# Patient Record
Sex: MALE | Race: WHITE | NOT HISPANIC OR LATINO | Employment: OTHER | URBAN - METROPOLITAN AREA
[De-identification: names, ages, dates, MRNs, and addresses within clinical notes are randomized per-mention and may not be internally consistent; named-entity substitution may affect disease eponyms.]

---

## 2017-01-05 ENCOUNTER — OFFICE VISIT (OUTPATIENT)
Dept: ENDOCRINOLOGY | Age: 67
End: 2017-01-05

## 2017-01-05 VITALS
BODY MASS INDEX: 35.04 KG/M2 | SYSTOLIC BLOOD PRESSURE: 126 MMHG | WEIGHT: 231.2 LBS | RESPIRATION RATE: 16 BRPM | HEIGHT: 68 IN | DIASTOLIC BLOOD PRESSURE: 80 MMHG

## 2017-01-05 DIAGNOSIS — R79.89 LOW TESTOSTERONE: Primary | ICD-10-CM

## 2017-01-05 DIAGNOSIS — E55.9 AVITAMINOSIS D: ICD-10-CM

## 2017-01-05 DIAGNOSIS — IMO0002 UNCONTROLLED TYPE 2 DIABETES MELLITUS WITH OTHER SPECIFIED COMPLICATION: ICD-10-CM

## 2017-01-05 DIAGNOSIS — E78.5 HYPERLIPIDEMIA, UNSPECIFIED HYPERLIPIDEMIA TYPE: ICD-10-CM

## 2017-01-05 DIAGNOSIS — R79.89 LOW TESTOSTERONE: ICD-10-CM

## 2017-01-05 DIAGNOSIS — IMO0002 UNCONTROLLED TYPE 2 DIABETES MELLITUS WITH OTHER SPECIFIED COMPLICATION: Primary | ICD-10-CM

## 2017-01-05 DIAGNOSIS — I10 ESSENTIAL HYPERTENSION: ICD-10-CM

## 2017-01-05 PROCEDURE — 99214 OFFICE O/P EST MOD 30 MIN: CPT | Performed by: INTERNAL MEDICINE

## 2017-01-05 RX ORDER — SITAGLIPTIN 100 MG/1
100 TABLET, FILM COATED ORAL DAILY
Qty: 90 TABLET | Refills: 3 | Status: SHIPPED | OUTPATIENT
Start: 2017-01-05 | End: 2017-09-14 | Stop reason: SDUPTHER

## 2017-01-05 RX ORDER — LOSARTAN POTASSIUM AND HYDROCHLOROTHIAZIDE 25; 100 MG/1; MG/1
1 TABLET ORAL DAILY
Qty: 90 TABLET | Refills: 3 | Status: SHIPPED | OUTPATIENT
Start: 2017-01-05 | End: 2017-09-14 | Stop reason: SDUPTHER

## 2017-01-05 RX ORDER — ROSUVASTATIN CALCIUM 20 MG/1
20 TABLET, COATED ORAL DAILY
Qty: 90 TABLET | Refills: 3 | Status: SHIPPED | OUTPATIENT
Start: 2017-01-05 | End: 2017-05-11 | Stop reason: SDUPTHER

## 2017-01-05 RX ORDER — FENOFIBRATE 145 MG/1
145 TABLET, COATED ORAL DAILY
Qty: 90 TABLET | Refills: 3 | Status: SHIPPED | OUTPATIENT
Start: 2017-01-05 | End: 2017-09-14 | Stop reason: SDUPTHER

## 2017-01-05 RX ORDER — ERGOCALCIFEROL 1.25 MG/1
CAPSULE ORAL
Qty: 26 CAPSULE | Refills: 3 | Status: SHIPPED | OUTPATIENT
Start: 2017-01-05 | End: 2017-09-14 | Stop reason: SDUPTHER

## 2017-01-05 RX ORDER — TESTOSTERONE 10 MG/.5G
GEL, METERED TOPICAL
Qty: 120 G | Refills: 5 | Status: SHIPPED | OUTPATIENT
Start: 2017-01-05 | End: 2017-05-11 | Stop reason: SDUPTHER

## 2017-01-05 RX ORDER — PIOGLITAZONE HCL AND METFORMIN HCL 850; 15 MG/1; MG/1
TABLET ORAL
Qty: 270 TABLET | Refills: 3 | Status: SHIPPED | OUTPATIENT
Start: 2017-01-05 | End: 2017-09-14 | Stop reason: SDUPTHER

## 2017-01-05 RX ORDER — ICOSAPENT ETHYL 1000 MG/1
CAPSULE ORAL
Qty: 360 CAPSULE | Refills: 3 | Status: SHIPPED | OUTPATIENT
Start: 2017-01-05 | End: 2017-09-14 | Stop reason: SDUPTHER

## 2017-01-05 NOTE — LETTER
January 5, 2017     Terri Park PA-C  [address] [facility]  [place] [id number]    Patient: Reji Morales   YOB: 1950   Date of Visit: 1/5/2017       Dear Dr. Xavier PA-C:    Reji Morales was in my office today. Below are the relevant portions of my assessment and plan of care.      Diagnoses and all orders for this visit:    Uncontrolled type 2 diabetes mellitus with other specified complication  -     T4 & TSH ([facility]); Future  -     TestT+TestF+SHBG; Future  -     Uric Acid; Future  -     Vitamin D 25 Hydroxy; Future  -     Comprehensive Metabolic Panel; Future  -     C-Peptide; Future  -     Hemoglobin A1c; Future  -     Lipid Panel; Future  -     MicroAlbumin, Urine, Random; Future    Avitaminosis D  -     T4 & TSH ([facility]); Future  -     TestT+TestF+SHBG; Future  -     Uric Acid; Future  -     Vitamin D 25 Hydroxy; Future  -     Comprehensive Metabolic Panel; Future  -     C-Peptide; Future  -     Hemoglobin A1c; Future  -     Lipid Panel; Future  -     MicroAlbumin, Urine, Random; Future    Essential hypertension  -     T4 & TSH ([facility]); Future  -     TestT+TestF+SHBG; Future  -     Uric Acid; Future  -     Vitamin D 25 Hydroxy; Future  -     Comprehensive Metabolic Panel; Future  -     C-Peptide; Future  -     Hemoglobin A1c; Future  -     Lipid Panel; Future  -     MicroAlbumin, Urine, Random; Future    Hyperlipidemia, unspecified hyperlipidemia type  -     T4 & TSH ([facility]); Future  -     TestT+TestF+SHBG; Future  -     Uric Acid; Future  -     Vitamin D 25 Hydroxy; Future  -     Comprehensive Metabolic Panel; Future  -     C-Peptide; Future  -     Hemoglobin A1c; Future  -     Lipid Panel; Future  -     MicroAlbumin, Urine, Random; Future    Low testosterone  -     T4 & TSH ([facility]); Future  -     TestT+TestF+SHBG; Future  -     Uric Acid; Future  -     Vitamin D 25 Hydroxy; Future  -     Comprehensive Metabolic Panel; Future  -     C-Peptide; Future  -     Hemoglobin A1c;  Future  -     Lipid Panel; Future  -     MicroAlbumin, Urine, Random; Future    Other orders  -     rosuvastatin (CRESTOR) 20 MG tablet; Take 1 tablet by mouth Daily.  -     fenofibrate (TRICOR) 145 MG tablet; Take 1 tablet by mouth Daily.  -     losartan-hydrochlorothiazide (HYZAAR) 100-25 MG per tablet; Take 1 tablet by mouth Daily.  -     pioglitazone-metFORMIN (ACTOPLUS MET)  MG per tablet; 1 in am and 2 in pm  -     JANUVIA 100 MG tablet; Take 1 tablet by mouth Daily.  -     vitamin D (ERGOCALCIFEROL) 20698 UNITS capsule capsule; 2 po q week  -     Testosterone 10 MG/ACT (2%) gel; 2 pump actuation to each inner thighs daily  -     VASCEPA 1 G capsule capsule; 2 po bid                  If you have questions, please do not hesitate to call me. I look forward to following Reji along with you.         Sincerely,        Leoncio Calvert MD        CC: No Recipients

## 2017-01-05 NOTE — MR AVS SNAPSHOT
Reji Morales   1/5/2017 10:20 AM   Office Visit    Dept Phone:  [phone]   Encounter #:  [id number]    Provider:  Leoncio Calvert MD   Department:  [facility] ENDOCRINOLOGY                Your Full Care Plan              Today's Medication Changes          These changes are accurate as of: 1/5/17 11:18 AM.  If you have any questions, ask your nurse or doctor.               New Medication(s)Ordered:     fenofibrate 145 MG tablet   Commonly known as:  TRICOR   Take 1 tablet by mouth Daily.   Started by:  Leoncio Calvert MD       JANUVIA 100 MG tablet   Generic drug:  SITagliptin   Take 1 tablet by mouth Daily.   Replaces:  SITagliptin 100 MG tablet   Started by:  Leoncio Calvert MD       VASCEPA 1 G capsule capsule   Generic drug:  icosapent ethyl   2 po bid   Replaces:  icosapent ethyl 1 G capsule capsule   Started by:  Leoncio Calvert MD         Medication(s)that have changed:     rosuvastatin 20 MG tablet   Commonly known as:  CRESTOR   Take 1 tablet by mouth Daily.   What changed:  See the new instructions.   Changed by:  Leoncio Calvert MD       Testosterone 10 MG/ACT (2%) gel   2 pump actuation to each inner thighs daily   What changed:  additional instructions   Changed by:  Leoncio Calvert MD         Stop taking medication(s)listed here:     fenofibrate micronized 130 MG capsule   Commonly known as:  ANTARA   Stopped by:  Leoncio Calvert MD           icosapent ethyl 1 G capsule capsule   Commonly known as:  VASCEPA   Replaced by:  VASCEPA 1 G capsule capsule   Stopped by:  Leoncio Calvert MD           SITagliptin 100 MG tablet   Commonly known as:  JANUVIA   Replaced by:  JANUVIA 100 MG tablet   Stopped by:  Leoncio Calvert MD                Where to Get Your Medications      These medications were sent to [facility] [address] [place] [address] [facility] [phone] [facility] [phone]   [address] [place] [address]     Phone:   [phone]     fenofibrate 145 MG tablet    JANUVIA 100 MG tablet    losartan-hydrochlorothiazide 100-25 MG per tablet    pioglitazone-metFORMIN  MG per tablet    rosuvastatin 20 MG tablet    VASCEPA 1 G capsule capsule    vitamin D 60525 UNITS capsule capsule         You can get these medications from any pharmacy     Bring a paper prescription for each of these medications     Testosterone 10 MG/ACT (2%) gel                  Your Updated Medication List          This list is accurate as of: 1/5/17 11:18 AM.  Always use your most recent med list.                aspirin 81 MG disintegrating tablet       fenofibrate 145 MG tablet   Commonly known as:  TRICOR   Take 1 tablet by mouth Daily.       JANUVIA 100 MG tablet   Generic drug:  SITagliptin   Take 1 tablet by mouth Daily.       losartan-hydrochlorothiazide 100-25 MG per tablet   Commonly known as:  HYZAAR   Take 1 tablet by mouth Daily.       pioglitazone-metFORMIN  MG per tablet   Commonly known as:  ACTOPLUS MET   1 in am and 2 in pm       rosuvastatin 20 MG tablet   Commonly known as:  CRESTOR   Take 1 tablet by mouth Daily.       Testosterone 10 MG/ACT (2%) gel   2 pump actuation to each inner thighs daily       VASCEPA 1 G capsule capsule   Generic drug:  icosapent ethyl   2 po bid       vitamin b complex tablet tablet       vitamin D 17263 UNITS capsule capsule   Commonly known as:  ERGOCALCIFEROL   2 po q week               You Were Diagnosed With        Codes Comments    Uncontrolled type 2 diabetes mellitus with other specified complication    -  Primary ICD-10-CM: E11.69, E11.65     Avitaminosis D     ICD-10-CM: E55.9  ICD-9-CM: 268.9     Essential hypertension     ICD-10-CM: I10  ICD-9-CM: 401.9     Hyperlipidemia, unspecified hyperlipidemia type     ICD-10-CM: E78.5  ICD-9-CM: 272.4     Low testosterone     ICD-10-CM: E29.1  ICD-9-CM: 257.2       Instructions     None    Patient Instructions History      Upcoming Appointments     Visit  Type Date Time Department    OFFICE VISIT 2017 10:20 AM [facility]    [facility] 2017  8:30 AM [facility]    OFFICE VISIT 2017 11:00 AM [facility]    [facility] 2017  8:30 AM [facility]    OFFICE VISIT 2017  9:40 AM [facility]      MyChart Signup     [facility] [employer] allows you to send messages to your doctor, view your test results, renew your prescriptions, schedule appointments, and more. To sign up, go to [employer] and click on the Sign Up Now link in the New User? box. Enter your [employer] Activation Code exactly as it appears below along with the last four digits of your Social Security Number and your Date of Birth () to complete the sign-up process. If you do not sign up before the expiration date, you must request a new code.    [employer] Activation Code: [email or web address]  Expires: 2017 11:12 AM    If you have questions, you can email [email or web address] or call [phone] to talk to our [employer] staff. Remember, [employer] is NOT to be used for urgent needs. For medical emergencies, dial 911.               Other Info from Your Visit           Your Appointments     2017  8:30 AM EDT   [facility] with [facility], [facility]   [facility] ENDOCRINOLOGY (--)    [address] [place] [id number] [phone]            May 11, 2017 11:00 AM EDT   Office Visit with THAIS Vann   [facility] ENDOCRINOLOGY (--)    [address] [place] [id number] [phone]           Arrive 15 minutes prior to appointment.            Aug 31, 2017  8:30 AM EDT   [facility] with [facility], [facility]   [facility] ENDOCRINOLOGY (--)    [address] [place] [id number] [phone]            Sep 14, 2017  9:40 AM EDT   Office Visit with Leoncio Calvert MD   [facility] ENDOCRINOLOGY (--)  "   [address] [address] [phone]           Arrive 15 minutes prior to appointment.              Allergies     No Known Drug Allergy        Vital Signs     Blood Pressure Respirations Height Weight Body Mass Index Smoking Status    126/80 16 68\" (172.7 cm) 231 lb 3.2 oz (105 kg) 35.15 kg/m2 Never Smoker      Problems and Diagnoses Noted     Avitaminosis D    High blood pressure    Type II diabetes mellitus without control    High cholesterol or triglycerides    Low testosterone      No Longer an Issue     Serum calcium elevated        "

## 2017-01-05 NOTE — LETTER
January 5, 2017     Terri Park PA-C  [address] [facility]  [address]  [place] [id number]    Patient: Reji Morales   YOB: 1950   Date of Visit: 1/5/2017       Dear Dr. Xavier PA-C:    Thank you for referring Reji Morales to me for evaluation. Below are the relevant portions of my assessment and plan of care.      Diagnoses and all orders for this visit:    Uncontrolled type 2 diabetes mellitus with other specified complication  -     T4 & TSH ([facility]); Future  -     TestT+TestF+SHBG; Future  -     Uric Acid; Future  -     Vitamin D 25 Hydroxy; Future  -     Comprehensive Metabolic Panel; Future  -     C-Peptide; Future  -     Hemoglobin A1c; Future  -     Lipid Panel; Future  -     MicroAlbumin, Urine, Random; Future    Avitaminosis D  -     T4 & TSH ([facility]); Future  -     TestT+TestF+SHBG; Future  -     Uric Acid; Future  -     Vitamin D 25 Hydroxy; Future  -     Comprehensive Metabolic Panel; Future  -     C-Peptide; Future  -     Hemoglobin A1c; Future  -     Lipid Panel; Future  -     MicroAlbumin, Urine, Random; Future    Essential hypertension  -     T4 & TSH ([facility]); Future  -     TestT+TestF+SHBG; Future  -     Uric Acid; Future  -     Vitamin D 25 Hydroxy; Future  -     Comprehensive Metabolic Panel; Future  -     C-Peptide; Future  -     Hemoglobin A1c; Future  -     Lipid Panel; Future  -     MicroAlbumin, Urine, Random; Future    Hyperlipidemia, unspecified hyperlipidemia type  -     T4 & TSH ([facility]); Future  -     TestT+TestF+SHBG; Future  -     Uric Acid; Future  -     Vitamin D 25 Hydroxy; Future  -     Comprehensive Metabolic Panel; Future  -     C-Peptide; Future  -     Hemoglobin A1c; Future  -     Lipid Panel; Future  -     MicroAlbumin, Urine, Random; Future    Low testosterone  -     T4 & TSH ([facility]); Future  -     TestT+TestF+SHBG; Future  -     Uric Acid; Future  -     Vitamin D 25 Hydroxy; Future  -     Comprehensive Metabolic Panel; Future  -     C-Peptide; Future  -      Hemoglobin A1c; Future  -     Lipid Panel; Future  -     MicroAlbumin, Urine, Random; Future    Other orders  -     rosuvastatin (CRESTOR) 20 MG tablet; Take 1 tablet by mouth Daily.  -     fenofibrate (TRICOR) 145 MG tablet; Take 1 tablet by mouth Daily.  -     losartan-hydrochlorothiazide (HYZAAR) 100-25 MG per tablet; Take 1 tablet by mouth Daily.  -     pioglitazone-metFORMIN (ACTOPLUS MET)  MG per tablet; 1 in am and 2 in pm  -     JANUVIA 100 MG tablet; Take 1 tablet by mouth Daily.  -     vitamin D (ERGOCALCIFEROL) 82637 UNITS capsule capsule; 2 po q week  -     Testosterone 10 MG/ACT (2%) gel; 2 pump actuation to each inner thighs daily  -     VASCEPA 1 G capsule capsule; 2 po bid                          If you have questions, please do not hesitate to call me. I look forward to following Reji along with you.         Sincerely,        Leoncio Calvert MD        CC: No Recipients

## 2017-01-05 NOTE — PROGRESS NOTES
Kalie Morales is a 66 y.o. male seen for follow up for DM2, hypogonadism, vit d deficiency, hyperlipidemia, lab review. Patient is not checking his BG. He denies any problems or concerns. He is needing his medication changed due to insurance coverage.     History of Present Illness this is a 66-year-old gentleman known patient with type II diabetes hypertension dyslipidemia hypogonadism vitamin D deficiency and coronary artery disease a status post coronary artery bypass graft surgery.  Over the course of last 6 months he has had no significant health problems for which to go to emergency room or hospital.    The following portions of the patient's history were reviewed and updated as appropriate: allergies, current medications, past family history, past medical history, past social history, past surgical history and problem list.    Review of Systems   Constitutional: Negative.    HENT: Negative.    Eyes: Negative.    Respiratory: Negative.    Cardiovascular: Negative.    Gastrointestinal: Negative.    Endocrine: Negative.    Genitourinary: Negative.    Musculoskeletal: Negative.    Skin: Negative.    Allergic/Immunologic: Negative.    Neurological: Negative.    Hematological: Negative.    Psychiatric/Behavioral: Negative.        Objective   Physical Exam   Constitutional: He is oriented to person, place, and time. He appears well-developed and well-nourished. No distress.   HENT:   Head: Normocephalic and atraumatic.   Right Ear: External ear normal.   Left Ear: External ear normal.   Nose: Nose normal.   Mouth/Throat: Oropharynx is clear and moist. No oropharyngeal exudate.   Eyes: Conjunctivae and EOM are normal. Pupils are equal, round, and reactive to light. Right eye exhibits no discharge. Left eye exhibits no discharge. No scleral icterus.   Neck: Normal range of motion. Neck supple. No JVD present. No tracheal deviation present. No thyromegaly present.   Cardiovascular: Normal rate, regular  rhythm, normal heart sounds and intact distal pulses.  Exam reveals no gallop and no friction rub.    No murmur heard.  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   Pulmonary/Chest: Effort normal and breath sounds normal. No stridor. No respiratory distress. He has no wheezes. He has no rales. He exhibits no tenderness.   Abdominal: Soft. Bowel sounds are normal. He exhibits no distension and no mass. There is no tenderness. There is no rebound and no guarding. No hernia.   Musculoskeletal: Normal range of motion. He exhibits no edema, tenderness or deformity.   Lymphadenopathy:     He has no cervical adenopathy.   Neurological: He is alert and oriented to person, place, and time. He has normal reflexes. He displays normal reflexes. No cranial nerve deficit. He exhibits normal muscle tone. Coordination normal.   Skin: Skin is warm and dry. No rash noted. He is not diaphoretic. No erythema. No pallor.   Psychiatric: He has a normal mood and affect. His behavior is normal. Judgment and thought content normal.   Nursing note and vitals reviewed.        Assessment/Plan   Diagnoses and all orders for this visit:    Uncontrolled type 2 diabetes mellitus with other specified complication  -     T4 & TSH ([facility]); Future  -     TestT+TestF+SHBG; Future  -     Uric Acid; Future  -     Vitamin D 25 Hydroxy; Future  -     Comprehensive Metabolic Panel; Future  -     C-Peptide; Future  -     Hemoglobin A1c; Future  -     Lipid Panel; Future  -     MicroAlbumin, Urine, Random; Future    Avitaminosis D  -     T4 & TSH ([facility]); Future  -     TestT+TestF+SHBG; Future  -     Uric Acid; Future  -     Vitamin D 25 Hydroxy; Future  -     Comprehensive Metabolic Panel; Future  -     C-Peptide; Future  -     Hemoglobin A1c; Future  -     Lipid Panel; Future  -     MicroAlbumin, Urine, Random; Future    Essential hypertension  -     T4 & TSH ([facility]); Future  -     TestT+TestF+SHBG; Future  -     Uric Acid; Future  -      Vitamin D 25 Hydroxy; Future  -     Comprehensive Metabolic Panel; Future  -     C-Peptide; Future  -     Hemoglobin A1c; Future  -     Lipid Panel; Future  -     MicroAlbumin, Urine, Random; Future    Hyperlipidemia, unspecified hyperlipidemia type  -     T4 & TSH ([facility]); Future  -     TestT+TestF+SHBG; Future  -     Uric Acid; Future  -     Vitamin D 25 Hydroxy; Future  -     Comprehensive Metabolic Panel; Future  -     C-Peptide; Future  -     Hemoglobin A1c; Future  -     Lipid Panel; Future  -     MicroAlbumin, Urine, Random; Future    Low testosterone  -     T4 & TSH ([facility]); Future  -     TestT+TestF+SHBG; Future  -     Uric Acid; Future  -     Vitamin D 25 Hydroxy; Future  -     Comprehensive Metabolic Panel; Future  -     C-Peptide; Future  -     Hemoglobin A1c; Future  -     Lipid Panel; Future  -     MicroAlbumin, Urine, Random; Future    Other orders  -     rosuvastatin (CRESTOR) 20 MG tablet; Take 1 tablet by mouth Daily.  -     fenofibrate (TRICOR) 145 MG tablet; Take 1 tablet by mouth Daily.  -     losartan-hydrochlorothiazide (HYZAAR) 100-25 MG per tablet; Take 1 tablet by mouth Daily.  -     pioglitazone-metFORMIN (ACTOPLUS MET)  MG per tablet; 1 in am and 2 in pm  -     JANUVIA 100 MG tablet; Take 1 tablet by mouth Daily.  -     vitamin D (ERGOCALCIFEROL) 71554 UNITS capsule capsule; 2 po q week  -     Testosterone 10 MG/ACT (2%) gel; 2 pump actuation to each inner thighs daily  -     VASCEPA 1 G capsule capsule; 2 po bid               In summary I saw and examined this 66-year-old gentleman for above-mentioned problems.  I reviewed his laboratory evaluation of 12/22/2016 and provided him with a hard copy of it.  He is clinically and metabolically stable and therefore we will go ahead and continue all his current prescriptions.  He will see Ms. May Sandoval in 4 months and myself in 8 months or sooner if needed with laboratory evaluation prior to each office visit.

## 2017-04-27 ENCOUNTER — RESULTS ENCOUNTER (OUTPATIENT)
Dept: ENDOCRINOLOGY | Age: 67
End: 2017-04-27

## 2017-04-27 ENCOUNTER — LAB (OUTPATIENT)
Dept: ENDOCRINOLOGY | Age: 67
End: 2017-04-27

## 2017-04-27 DIAGNOSIS — E78.5 HYPERLIPIDEMIA, UNSPECIFIED HYPERLIPIDEMIA TYPE: ICD-10-CM

## 2017-04-27 DIAGNOSIS — IMO0002 UNCONTROLLED TYPE 2 DIABETES MELLITUS WITH OTHER SPECIFIED COMPLICATION: ICD-10-CM

## 2017-04-27 DIAGNOSIS — I10 ESSENTIAL HYPERTENSION: ICD-10-CM

## 2017-04-27 DIAGNOSIS — R79.89 LOW TESTOSTERONE: ICD-10-CM

## 2017-04-27 DIAGNOSIS — E55.9 AVITAMINOSIS D: ICD-10-CM

## 2017-04-27 DIAGNOSIS — E55.9 AVITAMINOSIS D: Primary | ICD-10-CM

## 2017-04-28 LAB
25(OH)D3+25(OH)D2 SERPL-MCNC: 48 NG/ML (ref 30–100)
ALBUMIN SERPL-MCNC: 4.5 G/DL (ref 3.5–5.2)
ALBUMIN/GLOB SERPL: 1.8 G/DL
ALP SERPL-CCNC: 28 U/L (ref 39–117)
ALT SERPL-CCNC: 29 U/L (ref 1–41)
AST SERPL-CCNC: 28 U/L (ref 1–40)
BILIRUB SERPL-MCNC: 0.9 MG/DL (ref 0.1–1.2)
BUN SERPL-MCNC: 16 MG/DL (ref 8–23)
BUN/CREAT SERPL: 16.8 (ref 7–25)
C PEPTIDE SERPL-MCNC: 4.3 NG/ML (ref 1.1–4.4)
CALCIUM SERPL-MCNC: 10.2 MG/DL (ref 8.6–10.5)
CHLORIDE SERPL-SCNC: 100 MMOL/L (ref 98–107)
CHOLEST SERPL-MCNC: 130 MG/DL (ref 0–200)
CO2 SERPL-SCNC: 28.9 MMOL/L (ref 22–29)
CONV COMMENT: ABNORMAL
CREAT SERPL-MCNC: 0.95 MG/DL (ref 0.76–1.27)
GLOBULIN SER CALC-MCNC: 2.5 GM/DL
GLUCOSE SERPL-MCNC: 144 MG/DL (ref 65–99)
HBA1C MFR BLD: 6.61 % (ref 4.8–5.6)
HDLC SERPL-MCNC: 50 MG/DL (ref 40–60)
LDLC SERPL CALC-MCNC: 52 MG/DL (ref 0–100)
MICROALBUMIN UR-MCNC: 6.9 UG/ML
POTASSIUM SERPL-SCNC: 4.4 MMOL/L (ref 3.5–5.2)
PROT SERPL-MCNC: 7 G/DL (ref 6–8.5)
PSA SERPL-MCNC: 0.47 NG/ML (ref 0–4)
SHBG SERPL-SCNC: 31.9 NMOL/L (ref 19.3–76.4)
SODIUM SERPL-SCNC: 142 MMOL/L (ref 136–145)
T4 SERPL-MCNC: 7.66 MCG/DL (ref 4.5–11.7)
TESTOST FREE SERPL-MCNC: 3.4 PG/ML (ref 6.6–18.1)
TESTOST SERPL-MCNC: 86 NG/DL (ref 348–1197)
TRIGL SERPL-MCNC: 138 MG/DL (ref 0–150)
TSH SERPL DL<=0.005 MIU/L-ACNC: 3.39 MIU/ML (ref 0.27–4.2)
URATE SERPL-MCNC: 3.9 MG/DL (ref 3.4–7)
VLDLC SERPL CALC-MCNC: 27.6 MG/DL (ref 5–40)

## 2017-05-11 ENCOUNTER — OFFICE VISIT (OUTPATIENT)
Dept: ENDOCRINOLOGY | Age: 67
End: 2017-05-11

## 2017-05-11 VITALS
HEIGHT: 68 IN | SYSTOLIC BLOOD PRESSURE: 130 MMHG | BODY MASS INDEX: 34.92 KG/M2 | DIASTOLIC BLOOD PRESSURE: 68 MMHG | WEIGHT: 230.4 LBS

## 2017-05-11 DIAGNOSIS — E78.5 HYPERLIPIDEMIA, UNSPECIFIED HYPERLIPIDEMIA TYPE: ICD-10-CM

## 2017-05-11 DIAGNOSIS — E29.1 HYPOGONADISM IN MALE: Primary | ICD-10-CM

## 2017-05-11 DIAGNOSIS — E55.9 VITAMIN D DEFICIENCY: ICD-10-CM

## 2017-05-11 DIAGNOSIS — IMO0002 UNCONTROLLED TYPE 2 DIABETES MELLITUS WITH OTHER SPECIFIED COMPLICATION: ICD-10-CM

## 2017-05-11 DIAGNOSIS — I10 ESSENTIAL HYPERTENSION: ICD-10-CM

## 2017-05-11 PROBLEM — E11.9 TYPE 2 DIABETES MELLITUS (HCC): Status: ACTIVE | Noted: 2017-05-11

## 2017-05-11 PROCEDURE — 99214 OFFICE O/P EST MOD 30 MIN: CPT | Performed by: NURSE PRACTITIONER

## 2017-05-11 RX ORDER — TESTOSTERONE 10 MG/.5G
GEL, METERED TOPICAL
Qty: 180 G | Refills: 0 | Status: SHIPPED | OUTPATIENT
Start: 2017-05-11 | End: 2017-08-10 | Stop reason: SDUPTHER

## 2017-05-11 RX ORDER — ROSUVASTATIN CALCIUM 20 MG/1
20 TABLET, COATED ORAL DAILY
Qty: 90 TABLET | Refills: 3 | Status: SHIPPED | OUTPATIENT
Start: 2017-05-11 | End: 2017-09-14 | Stop reason: SDUPTHER

## 2017-08-09 RX ORDER — TESTOSTERONE 10 MG/.5G
GEL, METERED TOPICAL
Refills: 0 | Status: CANCELLED | OUTPATIENT
Start: 2017-08-09

## 2017-08-10 RX ORDER — TESTOSTERONE 10 MG/.5G
GEL, METERED TOPICAL
Qty: 180 G | Refills: 0 | OUTPATIENT
Start: 2017-08-10 | End: 2017-09-08 | Stop reason: SDUPTHER

## 2017-08-31 ENCOUNTER — LAB (OUTPATIENT)
Dept: ENDOCRINOLOGY | Age: 67
End: 2017-08-31

## 2017-08-31 DIAGNOSIS — E29.1 HYPOGONADISM IN MALE: ICD-10-CM

## 2017-08-31 DIAGNOSIS — E78.5 HYPERLIPIDEMIA, UNSPECIFIED HYPERLIPIDEMIA TYPE: ICD-10-CM

## 2017-08-31 DIAGNOSIS — R79.89 LOW TESTOSTERONE: ICD-10-CM

## 2017-08-31 DIAGNOSIS — E11.69 TYPE 2 DIABETES MELLITUS WITH OTHER SPECIFIED COMPLICATION (HCC): ICD-10-CM

## 2017-08-31 DIAGNOSIS — E55.9 VITAMIN D DEFICIENCY: ICD-10-CM

## 2017-08-31 DIAGNOSIS — Z12.5 SCREENING PSA (PROSTATE SPECIFIC ANTIGEN): ICD-10-CM

## 2017-08-31 DIAGNOSIS — E11.69 TYPE 2 DIABETES MELLITUS WITH OTHER SPECIFIED COMPLICATION (HCC): Primary | ICD-10-CM

## 2017-09-01 LAB
25(OH)D3+25(OH)D2 SERPL-MCNC: 39.7 NG/ML (ref 30–100)
ALBUMIN SERPL-MCNC: 4.8 G/DL (ref 3.5–5.2)
ALBUMIN/GLOB SERPL: 2.4 G/DL
ALP SERPL-CCNC: 25 U/L (ref 39–117)
ALT SERPL-CCNC: 26 U/L (ref 1–41)
AST SERPL-CCNC: 25 U/L (ref 1–40)
BILIRUB SERPL-MCNC: 0.9 MG/DL (ref 0.1–1.2)
BUN SERPL-MCNC: 20 MG/DL (ref 8–23)
BUN/CREAT SERPL: 20.4 (ref 7–25)
C PEPTIDE SERPL-MCNC: 3.7 NG/ML (ref 1.1–4.4)
CALCIUM SERPL-MCNC: 10.5 MG/DL (ref 8.6–10.5)
CHLORIDE SERPL-SCNC: 98 MMOL/L (ref 98–107)
CHOLEST SERPL-MCNC: 108 MG/DL (ref 0–200)
CO2 SERPL-SCNC: 27.2 MMOL/L (ref 22–29)
CREAT SERPL-MCNC: 0.98 MG/DL (ref 0.76–1.27)
ERYTHROCYTE [DISTWIDTH] IN BLOOD BY AUTOMATED COUNT: 14.2 % (ref 11.5–14.5)
GLOBULIN SER CALC-MCNC: 2 GM/DL
GLUCOSE SERPL-MCNC: 137 MG/DL (ref 65–99)
HBA1C MFR BLD: 6.34 % (ref 4.8–5.6)
HCT VFR BLD AUTO: 46 % (ref 40.4–52.2)
HDLC SERPL-MCNC: 44 MG/DL (ref 40–60)
HGB BLD-MCNC: 15.8 G/DL (ref 13.7–17.6)
LDLC SERPL CALC-MCNC: 41 MG/DL (ref 0–100)
MCH RBC QN AUTO: 32.6 PG (ref 27–32.7)
MCHC RBC AUTO-ENTMCNC: 34.3 G/DL (ref 32.6–36.4)
MCV RBC AUTO: 94.8 FL (ref 79.8–96.2)
MICROALBUMIN UR-MCNC: 6 UG/ML
PLATELET # BLD AUTO: 272 10*3/MM3 (ref 140–500)
POTASSIUM SERPL-SCNC: 4.5 MMOL/L (ref 3.5–5.2)
PROT SERPL-MCNC: 6.8 G/DL (ref 6–8.5)
RBC # BLD AUTO: 4.85 10*6/MM3 (ref 4.6–6)
SHBG SERPL-SCNC: 37 NMOL/L (ref 19.3–76.4)
SODIUM SERPL-SCNC: 140 MMOL/L (ref 136–145)
T3FREE SERPL-MCNC: 3.8 PG/ML (ref 2–4.4)
T4 FREE SERPL-MCNC: 1.23 NG/DL (ref 0.93–1.7)
T4 SERPL-MCNC: 8.45 MCG/DL (ref 4.5–11.7)
TESTOST FREE SERPL-MCNC: 4.9 PG/ML (ref 6.6–18.1)
TESTOST SERPL-MCNC: 226 NG/DL (ref 264–916)
TRIGL SERPL-MCNC: 116 MG/DL (ref 0–150)
TSH SERPL DL<=0.005 MIU/L-ACNC: 2.87 MIU/ML (ref 0.27–4.2)
URATE SERPL-MCNC: 3.6 MG/DL (ref 3.4–7)
VLDLC SERPL CALC-MCNC: 23.2 MG/DL (ref 5–40)
WBC # BLD AUTO: 8.93 10*3/MM3 (ref 4.5–10.7)

## 2017-09-08 RX ORDER — TESTOSTERONE 10 MG/.5G
GEL, METERED TOPICAL
Qty: 180 G | Refills: 0 | OUTPATIENT
Start: 2017-09-08 | End: 2017-09-14 | Stop reason: SDUPTHER

## 2017-09-14 ENCOUNTER — OFFICE VISIT (OUTPATIENT)
Dept: ENDOCRINOLOGY | Age: 67
End: 2017-09-14

## 2017-09-14 VITALS
DIASTOLIC BLOOD PRESSURE: 82 MMHG | WEIGHT: 225 LBS | RESPIRATION RATE: 16 BRPM | HEIGHT: 68 IN | BODY MASS INDEX: 34.1 KG/M2 | SYSTOLIC BLOOD PRESSURE: 122 MMHG

## 2017-09-14 DIAGNOSIS — E78.2 MIXED HYPERLIPIDEMIA: ICD-10-CM

## 2017-09-14 DIAGNOSIS — E55.9 VITAMIN D DEFICIENCY: ICD-10-CM

## 2017-09-14 DIAGNOSIS — IMO0002 UNCONTROLLED TYPE 2 DIABETES MELLITUS WITH OTHER SPECIFIED COMPLICATION: Primary | ICD-10-CM

## 2017-09-14 DIAGNOSIS — R79.89 LOW TESTOSTERONE: ICD-10-CM

## 2017-09-14 DIAGNOSIS — E29.1 HYPOGONADISM IN MALE: ICD-10-CM

## 2017-09-14 DIAGNOSIS — N40.0 BENIGN NON-NODULAR PROSTATIC HYPERPLASIA WITHOUT LOWER URINARY TRACT SYMPTOMS: ICD-10-CM

## 2017-09-14 DIAGNOSIS — I10 ESSENTIAL HYPERTENSION: ICD-10-CM

## 2017-09-14 PROCEDURE — 99214 OFFICE O/P EST MOD 30 MIN: CPT | Performed by: INTERNAL MEDICINE

## 2017-09-14 RX ORDER — LOSARTAN POTASSIUM AND HYDROCHLOROTHIAZIDE 25; 100 MG/1; MG/1
1 TABLET ORAL DAILY
Qty: 90 TABLET | Refills: 3 | Status: SHIPPED | OUTPATIENT
Start: 2017-09-14 | End: 2018-01-31 | Stop reason: SDUPTHER

## 2017-09-14 RX ORDER — TESTOSTERONE 10 MG/.5G
GEL, METERED TOPICAL
Qty: 180 G | Refills: 3 | Status: SHIPPED | OUTPATIENT
Start: 2017-09-14 | End: 2018-01-30

## 2017-09-14 RX ORDER — ROSUVASTATIN CALCIUM 20 MG/1
20 TABLET, COATED ORAL DAILY
Qty: 90 TABLET | Refills: 3 | Status: SHIPPED | OUTPATIENT
Start: 2017-09-14 | End: 2018-01-31 | Stop reason: SDUPTHER

## 2017-09-14 RX ORDER — ICOSAPENT ETHYL 1000 MG/1
CAPSULE ORAL
Qty: 360 CAPSULE | Refills: 3 | Status: SHIPPED | OUTPATIENT
Start: 2017-09-14 | End: 2018-01-31 | Stop reason: SDUPTHER

## 2017-09-14 RX ORDER — TESTOSTERONE 10 MG/.5G
GEL, METERED TOPICAL
Qty: 180 G | Refills: 3 | OUTPATIENT
Start: 2017-09-14 | End: 2017-09-14 | Stop reason: SDUPTHER

## 2017-09-14 RX ORDER — PIOGLITAZONE HCL AND METFORMIN HCL 850; 15 MG/1; MG/1
TABLET ORAL
Qty: 270 TABLET | Refills: 3 | Status: SHIPPED | OUTPATIENT
Start: 2017-09-14 | End: 2018-01-31 | Stop reason: SDUPTHER

## 2017-09-14 RX ORDER — SITAGLIPTIN 100 MG/1
100 TABLET, FILM COATED ORAL DAILY
Qty: 90 TABLET | Refills: 3 | Status: SHIPPED | OUTPATIENT
Start: 2017-09-14 | End: 2018-01-31 | Stop reason: SDUPTHER

## 2017-09-14 RX ORDER — ERGOCALCIFEROL 1.25 MG/1
CAPSULE ORAL
Qty: 26 CAPSULE | Refills: 3 | Status: SHIPPED | OUTPATIENT
Start: 2017-09-14 | End: 2018-01-31 | Stop reason: SDUPTHER

## 2017-09-14 RX ORDER — FENOFIBRATE 145 MG/1
145 TABLET, COATED ORAL DAILY
Qty: 90 TABLET | Refills: 3 | Status: SHIPPED | OUTPATIENT
Start: 2017-09-14 | End: 2018-01-31 | Stop reason: SDUPTHER

## 2017-09-14 NOTE — PROGRESS NOTES
Kalie Morales is a 67 y.o. male seen for follow up for DM2, hypogonadism, vit d deficiency, lab review. Patient is not checking his BG. He denies any problems or concerns.     History of Present Illness this is a 67-year-old gentleman known patient with type II diabetes hypertension and dyslipidemia as well as hypogonadism and vitamin D deficiency.  Over the course of last 6 months he has had no significant health problems for which to go to the emergency room or hospital.       Allergies   Allergen Reactions   • No Known Drug Allergy        Current Outpatient Prescriptions:   •  aspirin 81 MG disintegrating tablet, Take  by mouth., Disp: , Rfl:   •  fenofibrate (TRICOR) 145 MG tablet, Take 1 tablet by mouth Daily., Disp: 90 tablet, Rfl: 3  •  JANUVIA 100 MG tablet, Take 1 tablet by mouth Daily., Disp: 90 tablet, Rfl: 3  •  losartan-hydrochlorothiazide (HYZAAR) 100-25 MG per tablet, Take 1 tablet by mouth Daily., Disp: 90 tablet, Rfl: 3  •  pioglitazone-metFORMIN (ACTOPLUS MET)  MG per tablet, 1 in am and 2 in pm, Disp: 270 tablet, Rfl: 3  •  rosuvastatin (CRESTOR) 20 MG tablet, Take 1 tablet by mouth Daily., Disp: 90 tablet, Rfl: 3  •  Testosterone 10 MG/ACT (2%) gel, 3 pump actuation to each inner thighs daily, Disp: 180 g, Rfl: 0  •  VASCEPA 1 G capsule capsule, 2 po bid, Disp: 360 capsule, Rfl: 3  •  vitamin b complex (TOTAL B/C) tablet tablet, Take  by mouth., Disp: , Rfl:   •  vitamin D (ERGOCALCIFEROL) 75547 UNITS capsule capsule, 2 po q week, Disp: 26 capsule, Rfl: 3      The following portions of the patient's history were reviewed and updated as appropriate: allergies, current medications, past family history, past medical history, past social history, past surgical history and problem list.    Review of Systems   Constitutional: Negative.    HENT: Negative.    Eyes: Negative.    Respiratory: Negative.    Cardiovascular: Negative.    Gastrointestinal: Negative.    Endocrine: Negative.     Genitourinary: Negative.    Musculoskeletal: Negative.    Skin: Negative.    Allergic/Immunologic: Negative.    Neurological: Negative.    Hematological: Negative.    Psychiatric/Behavioral: Negative.        Objective   Physical Exam   Constitutional: He is oriented to person, place, and time. He appears well-developed and well-nourished. No distress.   HENT:   Head: Normocephalic and atraumatic.   Right Ear: External ear normal.   Left Ear: External ear normal.   Nose: Nose normal.   Mouth/Throat: Oropharynx is clear and moist. No oropharyngeal exudate.   Eyes: Conjunctivae and EOM are normal. Pupils are equal, round, and reactive to light. Right eye exhibits no discharge. Left eye exhibits no discharge. No scleral icterus.   Neck: Normal range of motion. Neck supple. No JVD present. No tracheal deviation present. No thyromegaly present.   Cardiovascular: Normal rate, regular rhythm, normal heart sounds and intact distal pulses.  Exam reveals no gallop and no friction rub.    No murmur heard.  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   Pulmonary/Chest: Effort normal and breath sounds normal. No stridor. No respiratory distress. He has no wheezes. He has no rales. He exhibits no tenderness.   Abdominal: Soft. Bowel sounds are normal. He exhibits no distension and no mass. There is no tenderness. There is no rebound and no guarding. No hernia.   Musculoskeletal: Normal range of motion. He exhibits no edema, tenderness or deformity.   Lymphadenopathy:     He has no cervical adenopathy.   Neurological: He is alert and oriented to person, place, and time. He has normal reflexes. He displays normal reflexes. No cranial nerve deficit. He exhibits normal muscle tone. Coordination normal.   Skin: Skin is warm and dry. No rash noted. He is not diaphoretic. No erythema. No pallor.   Psychiatric: He has a normal mood and affect. His behavior is normal. Judgment and thought content normal.   Nursing note and  vitals reviewed.    Results for orders placed or performed in visit on 08/31/17   Lipid Panel   Result Value Ref Range    Total Cholesterol 108 0 - 200 mg/dL    Triglycerides 116 0 - 150 mg/dL    HDL Cholesterol 44 40 - 60 mg/dL    VLDL Cholesterol 23.2 5 - 40 mg/dL    LDL Cholesterol  41 0 - 100 mg/dL   T3, Free   Result Value Ref Range    T3, Free 3.8 2.0 - 4.4 pg/mL   T4 & TSH ([facility])   Result Value Ref Range    TSH 2.870 0.270 - 4.200 mIU/mL    T4, Total 8.45 4.50 - 11.70 mcg/dL   T4, Free   Result Value Ref Range    Free T4 1.23 0.93 - 1.70 ng/dL   TestT+TestF+SHBG   Result Value Ref Range    Testosterone, Total 226 (L) 264 - 916 ng/dL    Testosterone, Free 4.9 (L) 6.6 - 18.1 pg/mL    Sex Hormone Binding Globulin 37.0 19.3 - 76.4 nmol/L   Uric Acid   Result Value Ref Range    Uric Acid 3.6 3.4 - 7.0 mg/dL   Vitamin D 25 Hydroxy   Result Value Ref Range    25 Hydroxy, Vitamin D 39.7 30.0 - 100.0 ng/mL   CBC (No Diff)   Result Value Ref Range    WBC 8.93 4.50 - 10.70 10*3/mm3    RBC 4.85 4.60 - 6.00 10*6/mm3    Hemoglobin 15.8 13.7 - 17.6 g/dL    Hematocrit 46.0 40.4 - 52.2 %    MCV 94.8 79.8 - 96.2 fL    MCH 32.6 27.0 - 32.7 pg    MCHC 34.3 32.6 - 36.4 g/dL    RDW 14.2 11.5 - 14.5 %    Platelets 272 140 - 500 10*3/mm3   Comprehensive Metabolic Panel   Result Value Ref Range    Glucose 137 (H) 65 - 99 mg/dL    BUN 20 8 - 23 mg/dL    Creatinine 0.98 0.76 - 1.27 mg/dL    eGFR Non African Am 76 >60 mL/min/1.73    eGFR African Am 92 >60 mL/min/1.73    BUN/Creatinine Ratio 20.4 7.0 - 25.0    Sodium 140 136 - 145 mmol/L    Potassium 4.5 3.5 - 5.2 mmol/L    Chloride 98 98 - 107 mmol/L    Total CO2 27.2 22.0 - 29.0 mmol/L    Calcium 10.5 8.6 - 10.5 mg/dL    Total Protein 6.8 6.0 - 8.5 g/dL    Albumin 4.80 3.50 - 5.20 g/dL    Globulin 2.0 gm/dL    A/G Ratio 2.4 g/dL    Total Bilirubin 0.9 0.1 - 1.2 mg/dL    Alkaline Phosphatase 25 (L) 39 - 117 U/L    AST (SGOT) 25 1 - 40 U/L    ALT (SGPT) 26 1 - 41 U/L   C-Peptide    Result Value Ref Range    C-Peptide 3.7 1.1 - 4.4 ng/mL   Hemoglobin A1c   Result Value Ref Range    Hemoglobin A1C 6.34 (H) 4.80 - 5.60 %   MicroAlbumin, Urine, Random   Result Value Ref Range    Microalbumin, Urine 6.0 Not Estab. ug/mL         Assessment/Plan   Diagnoses and all orders for this visit:    Uncontrolled type 2 diabetes mellitus with other specified complication  -     T4 & TSH ([facility]); Future  -     TestT+TestF+SHBG; Future  -     Uric Acid; Future  -     Vitamin D 25 Hydroxy; Future  -     CBC & Differential; Future  -     Comprehensive Metabolic Panel; Future  -     C-Peptide; Future  -     Hemoglobin A1c; Future  -     Lipid Panel; Future  -     MicroAlbumin, Urine, Random; Future  -     PSA; Future    Hypogonadism in male  -     T4 & TSH ([facility]); Future  -     TestT+TestF+SHBG; Future  -     Uric Acid; Future  -     Vitamin D 25 Hydroxy; Future  -     CBC & Differential; Future  -     Comprehensive Metabolic Panel; Future  -     C-Peptide; Future  -     Hemoglobin A1c; Future  -     Lipid Panel; Future  -     MicroAlbumin, Urine, Random; Future  -     PSA; Future    Mixed hyperlipidemia  -     T4 & TSH ([facility]); Future  -     TestT+TestF+SHBG; Future  -     Uric Acid; Future  -     Vitamin D 25 Hydroxy; Future  -     CBC & Differential; Future  -     Comprehensive Metabolic Panel; Future  -     C-Peptide; Future  -     Hemoglobin A1c; Future  -     Lipid Panel; Future  -     MicroAlbumin, Urine, Random; Future  -     PSA; Future    Essential hypertension  -     T4 & TSH ([facility]); Future  -     TestT+TestF+SHBG; Future  -     Uric Acid; Future  -     Vitamin D 25 Hydroxy; Future  -     CBC & Differential; Future  -     Comprehensive Metabolic Panel; Future  -     C-Peptide; Future  -     Hemoglobin A1c; Future  -     Lipid Panel; Future  -     MicroAlbumin, Urine, Random; Future  -     PSA; Future    Vitamin D deficiency  -     T4 & TSH ([facility]); Future  -     TestT+TestF+SHBG; Future  -      Uric Acid; Future  -     Vitamin D 25 Hydroxy; Future  -     CBC & Differential; Future  -     Comprehensive Metabolic Panel; Future  -     C-Peptide; Future  -     Hemoglobin A1c; Future  -     Lipid Panel; Future  -     MicroAlbumin, Urine, Random; Future  -     PSA; Future    Low testosterone  -     T4 & TSH ([facility]); Future  -     TestT+TestF+SHBG; Future  -     Uric Acid; Future  -     Vitamin D 25 Hydroxy; Future  -     CBC & Differential; Future  -     Comprehensive Metabolic Panel; Future  -     C-Peptide; Future  -     Hemoglobin A1c; Future  -     Lipid Panel; Future  -     MicroAlbumin, Urine, Random; Future  -     PSA; Future    Benign non-nodular prostatic hyperplasia without lower urinary tract symptoms  -     T4 & TSH ([facility]); Future  -     TestT+TestF+SHBG; Future  -     Uric Acid; Future  -     Vitamin D 25 Hydroxy; Future  -     CBC & Differential; Future  -     Comprehensive Metabolic Panel; Future  -     C-Peptide; Future  -     Hemoglobin A1c; Future  -     Lipid Panel; Future  -     MicroAlbumin, Urine, Random; Future  -     PSA; Future    Other orders  -     vitamin D (ERGOCALCIFEROL) 49257 units capsule capsule; 2 po q week  -     VASCEPA 1 g capsule capsule; 2 po bid  -     Testosterone 10 MG/ACT (2%) gel; 3 pump actuation to each inner thighs daily  -     rosuvastatin (CRESTOR) 20 MG tablet; Take 1 tablet by mouth Daily.  -     pioglitazone-metFORMIN (ACTOPLUS MET)  MG per tablet; 1 in am and 2 in pm  -     losartan-hydrochlorothiazide (HYZAAR) 100-25 MG per tablet; Take 1 tablet by mouth Daily.  -     JANUVIA 100 MG tablet; Take 1 tablet by mouth Daily.  -     fenofibrate (TRICOR) 145 MG tablet; Take 1 tablet by mouth Daily.             In summary I saw and examined this 67-year-old gentleman for above-mentioned problems.  I reviewed his laboratory evaluation of 08/31/2017 and provided him with a hard copy of it.  Overall he is clinically and metabolically stable.  We will  therefore continue his current medications and will see Ms. May Sandoval in 4 months or sooner if needed with laboratory evaluation prior to each office visit.

## 2018-01-10 ENCOUNTER — RESULTS ENCOUNTER (OUTPATIENT)
Dept: ENDOCRINOLOGY | Age: 68
End: 2018-01-10

## 2018-01-10 DIAGNOSIS — IMO0002 UNCONTROLLED TYPE 2 DIABETES MELLITUS WITH OTHER SPECIFIED COMPLICATION: ICD-10-CM

## 2018-01-10 DIAGNOSIS — R79.89 LOW TESTOSTERONE: ICD-10-CM

## 2018-01-10 DIAGNOSIS — N40.0 BENIGN NON-NODULAR PROSTATIC HYPERPLASIA WITHOUT LOWER URINARY TRACT SYMPTOMS: ICD-10-CM

## 2018-01-10 DIAGNOSIS — I10 ESSENTIAL HYPERTENSION: ICD-10-CM

## 2018-01-10 DIAGNOSIS — E55.9 VITAMIN D DEFICIENCY: ICD-10-CM

## 2018-01-10 DIAGNOSIS — E78.2 MIXED HYPERLIPIDEMIA: ICD-10-CM

## 2018-01-10 DIAGNOSIS — E29.1 HYPOGONADISM IN MALE: ICD-10-CM

## 2018-01-17 ENCOUNTER — LAB (OUTPATIENT)
Dept: ENDOCRINOLOGY | Age: 68
End: 2018-01-17

## 2018-01-17 DIAGNOSIS — E55.9 VITAMIN D DEFICIENCY: ICD-10-CM

## 2018-01-17 DIAGNOSIS — N40.0 BENIGN NON-NODULAR PROSTATIC HYPERPLASIA WITHOUT LOWER URINARY TRACT SYMPTOMS: ICD-10-CM

## 2018-01-17 DIAGNOSIS — R79.89 LOW TESTOSTERONE: ICD-10-CM

## 2018-01-17 DIAGNOSIS — E78.2 MIXED HYPERLIPIDEMIA: ICD-10-CM

## 2018-01-17 DIAGNOSIS — I10 ESSENTIAL HYPERTENSION: ICD-10-CM

## 2018-01-17 DIAGNOSIS — E29.1 HYPOGONADISM IN MALE: ICD-10-CM

## 2018-01-17 DIAGNOSIS — IMO0002 UNCONTROLLED TYPE 2 DIABETES MELLITUS WITH OTHER SPECIFIED COMPLICATION: ICD-10-CM

## 2018-01-20 LAB
25(OH)D3+25(OH)D2 SERPL-MCNC: 48.1 NG/ML (ref 30–100)
ALBUMIN SERPL-MCNC: 4.7 G/DL (ref 3.5–5.2)
ALBUMIN/GLOB SERPL: 2.1 G/DL
ALP SERPL-CCNC: 30 U/L (ref 39–117)
ALT SERPL-CCNC: 27 U/L (ref 1–41)
AST SERPL-CCNC: 20 U/L (ref 1–40)
BASOPHILS # BLD AUTO: 0.03 10*3/MM3 (ref 0–0.2)
BASOPHILS NFR BLD AUTO: 0.3 % (ref 0–1.5)
BILIRUB SERPL-MCNC: 0.9 MG/DL (ref 0.1–1.2)
BUN SERPL-MCNC: 17 MG/DL (ref 8–23)
BUN/CREAT SERPL: 17.7 (ref 7–25)
C PEPTIDE SERPL-MCNC: 2.9 NG/ML (ref 1.1–4.4)
CALCIUM SERPL-MCNC: 9.1 MG/DL (ref 8.6–10.5)
CHLORIDE SERPL-SCNC: 95 MMOL/L (ref 98–107)
CHOLEST SERPL-MCNC: 127 MG/DL (ref 0–200)
CO2 SERPL-SCNC: 28.8 MMOL/L (ref 22–29)
CREAT SERPL-MCNC: 0.96 MG/DL (ref 0.76–1.27)
EOSINOPHIL # BLD AUTO: 0.27 10*3/MM3 (ref 0–0.7)
EOSINOPHIL NFR BLD AUTO: 2.6 % (ref 0.3–6.2)
ERYTHROCYTE [DISTWIDTH] IN BLOOD BY AUTOMATED COUNT: 14.1 % (ref 11.5–14.5)
GLOBULIN SER CALC-MCNC: 2.2 GM/DL
GLUCOSE SERPL-MCNC: 146 MG/DL (ref 65–99)
HBA1C MFR BLD: 6.3 % (ref 4.8–5.6)
HCT VFR BLD AUTO: 46.4 % (ref 40.4–52.2)
HDLC SERPL-MCNC: 51 MG/DL (ref 40–60)
HGB BLD-MCNC: 15.6 G/DL (ref 13.7–17.6)
IMM GRANULOCYTES # BLD: 0.04 10*3/MM3 (ref 0–0.03)
IMM GRANULOCYTES NFR BLD: 0.4 % (ref 0–0.5)
INTERPRETATION: NORMAL
LDLC SERPL CALC-MCNC: 46 MG/DL (ref 0–100)
LYMPHOCYTES # BLD AUTO: 1.62 10*3/MM3 (ref 0.9–4.8)
LYMPHOCYTES NFR BLD AUTO: 15.4 % (ref 19.6–45.3)
Lab: NORMAL
MCH RBC QN AUTO: 32.5 PG (ref 27–32.7)
MCHC RBC AUTO-ENTMCNC: 33.6 G/DL (ref 32.6–36.4)
MCV RBC AUTO: 96.7 FL (ref 79.8–96.2)
MICROALBUMIN UR-MCNC: 9.3 UG/ML
MONOCYTES # BLD AUTO: 1.09 10*3/MM3 (ref 0.2–1.2)
MONOCYTES NFR BLD AUTO: 10.4 % (ref 5–12)
NEUTROPHILS # BLD AUTO: 7.45 10*3/MM3 (ref 1.9–8.1)
NEUTROPHILS NFR BLD AUTO: 70.9 % (ref 42.7–76)
PLATELET # BLD AUTO: 288 10*3/MM3 (ref 140–500)
POTASSIUM SERPL-SCNC: 4 MMOL/L (ref 3.5–5.2)
PROT SERPL-MCNC: 6.9 G/DL (ref 6–8.5)
PSA SERPL-MCNC: 0.79 NG/ML (ref 0–4)
RBC # BLD AUTO: 4.8 10*6/MM3 (ref 4.6–6)
SHBG SERPL-SCNC: 33.1 NMOL/L (ref 19.3–76.4)
SODIUM SERPL-SCNC: 136 MMOL/L (ref 136–145)
T4 SERPL-MCNC: 8.37 MCG/DL (ref 4.5–11.7)
TESTOST FREE SERPL-MCNC: 5.6 PG/ML (ref 6.6–18.1)
TESTOST SERPL-MCNC: 263 NG/DL (ref 264–916)
TRIGL SERPL-MCNC: 151 MG/DL (ref 0–150)
TSH SERPL DL<=0.005 MIU/L-ACNC: 2.92 MIU/ML (ref 0.27–4.2)
URATE SERPL-MCNC: 3 MG/DL (ref 3.4–7)
VLDLC SERPL CALC-MCNC: 30.2 MG/DL (ref 5–40)
WBC # BLD AUTO: 10.5 10*3/MM3 (ref 4.5–10.7)

## 2018-01-30 ENCOUNTER — OFFICE VISIT (OUTPATIENT)
Dept: ENDOCRINOLOGY | Age: 68
End: 2018-01-30

## 2018-01-30 VITALS
WEIGHT: 228 LBS | HEIGHT: 68 IN | DIASTOLIC BLOOD PRESSURE: 72 MMHG | BODY MASS INDEX: 34.56 KG/M2 | SYSTOLIC BLOOD PRESSURE: 140 MMHG

## 2018-01-30 DIAGNOSIS — I10 ESSENTIAL HYPERTENSION: ICD-10-CM

## 2018-01-30 DIAGNOSIS — E29.1 HYPOGONADISM IN MALE: ICD-10-CM

## 2018-01-30 DIAGNOSIS — E78.2 MIXED HYPERLIPIDEMIA: ICD-10-CM

## 2018-01-30 DIAGNOSIS — IMO0002 UNCONTROLLED TYPE 2 DIABETES MELLITUS WITH OTHER SPECIFIED COMPLICATION, WITHOUT LONG-TERM CURRENT USE OF INSULIN: Primary | ICD-10-CM

## 2018-01-30 DIAGNOSIS — E55.9 VITAMIN D DEFICIENCY: ICD-10-CM

## 2018-01-30 PROCEDURE — 99214 OFFICE O/P EST MOD 30 MIN: CPT | Performed by: NURSE PRACTITIONER

## 2018-01-30 NOTE — PROGRESS NOTES
"Kalie Morales is a 67 y.o. male is here today for follow-up.  Chief Complaint   Patient presents with   • Diabetes     recent labs, Pt does not test BG    • Hypogonadism     pt insurance no longer going to cover testosterone   • Hyperlipidemia   • Hypertension   • Vitamin D Deficiency   • low testosterone     /72  Ht 172.7 cm (68\")  Wt 103 kg (228 lb)  BMI 34.67 kg/m2  Current Outpatient Prescriptions on File Prior to Visit   Medication Sig   • aspirin 81 MG disintegrating tablet Take  by mouth.   • fenofibrate (TRICOR) 145 MG tablet Take 1 tablet by mouth Daily.   • JANUVIA 100 MG tablet Take 1 tablet by mouth Daily.   • losartan-hydrochlorothiazide (HYZAAR) 100-25 MG per tablet Take 1 tablet by mouth Daily.   • pioglitazone-metFORMIN (ACTOPLUS MET)  MG per tablet 1 in am and 2 in pm   • rosuvastatin (CRESTOR) 20 MG tablet Take 1 tablet by mouth Daily.   • Testosterone 10 MG/ACT (2%) gel 3 pump actuation to each inner thighs daily   • VASCEPA 1 g capsule capsule 2 po bid   • vitamin b complex (TOTAL B/C) tablet tablet Take  by mouth.   • vitamin D (ERGOCALCIFEROL) 18320 units capsule capsule 2 po q week     No current facility-administered medications on file prior to visit.      History reviewed. No pertinent family history.  Social History   Substance Use Topics   • Smoking status: Never Smoker   • Smokeless tobacco: None   • Alcohol use Yes     Allergies   Allergen Reactions   • No Known Drug Allergy          History of Present Illness  Encounter Diagnoses   Name Primary?   • Mixed hyperlipidemia    • Essential hypertension    • Vitamin D deficiency    • Uncontrolled type 2 diabetes mellitus with other specified complication, without long-term current use of insulin Yes   • Hypogonadism in male    • Hypercalcemia      67-year-old male patient here today for routine follow-up visit.  He has been seen for the above-mentioned problems.  He had recent labs done which were reviewed and he " was provided a copy.  He is using testosterone consistently however he is not seeing the benefit of the testosterone gel.  He has been informed by his insurance company that they are no longer cover the testosterone gel.  He will request a formulary from his insurance company to see what is covered.  We discussed his options and I have asked that he consider doing testosterone injections.  His blood pressure is slightly elevated today but he states it was due to the stress of trying to arrive on time to his appointment.  He states he is taking all his medications as prescribed and has no complaints.  He is currently retired and is enjoying being able to sleep in.  He does not currently check blood sugars.  His recent hemoglobin A1c reflects that his diabetes is under adequate control.  The following portions of the patient's history were reviewed and updated as appropriate: allergies, current medications, past family history, past medical history, past social history, past surgical history and problem list.    Review of Systems   Constitutional: Negative for fatigue.   HENT: Negative for trouble swallowing.    Eyes: Negative for visual disturbance.   Respiratory: Negative for shortness of breath.    Cardiovascular: Negative for leg swelling.   Endocrine: Negative for polyuria.   Skin: Negative for wound.   Neurological: Negative for numbness.       Objective   Physical Exam   Constitutional: He is oriented to person, place, and time. He appears well-developed and well-nourished. No distress.   HENT:   Head: Normocephalic and atraumatic.   Right Ear: External ear normal.   Left Ear: External ear normal.   Nose: Nose normal.   Mouth/Throat: Oropharynx is clear and moist. No oropharyngeal exudate.   Eyes: Conjunctivae and EOM are normal. Pupils are equal, round, and reactive to light. Right eye exhibits no discharge. Left eye exhibits no discharge. No scleral icterus.   Neck: Normal range of motion. Neck supple. No JVD  present. No tracheal deviation present. No thyromegaly present.   Cardiovascular: Normal rate, regular rhythm, normal heart sounds and intact distal pulses.  Exam reveals no gallop and no friction rub.    No murmur heard.  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   Pulmonary/Chest: Effort normal and breath sounds normal. No stridor. No respiratory distress. He has no wheezes. He has no rales. He exhibits no tenderness.   Abdominal: Soft. Bowel sounds are normal. He exhibits no distension and no mass. There is no tenderness. There is no rebound and no guarding. No hernia.   Musculoskeletal: Normal range of motion. He exhibits no edema, tenderness or deformity.   Lymphadenopathy:     He has no cervical adenopathy.   Neurological: He is alert and oriented to person, place, and time. He has normal reflexes. He displays normal reflexes. No cranial nerve deficit. He exhibits normal muscle tone. Coordination normal.   Skin: Skin is warm and dry. No rash noted. He is not diaphoretic. No erythema. No pallor.   Psychiatric: He has a normal mood and affect. His behavior is normal. Judgment and thought content normal.   Nursing note and vitals reviewed.    Results for orders placed or performed in visit on 01/10/18   T4 & TSH ([facility])   Result Value Ref Range    TSH 2.920 0.270 - 4.200 mIU/mL    T4, Total 8.37 4.50 - 11.70 mcg/dL   TestT+TestF+SHBG   Result Value Ref Range    Testosterone, Total 263 (L) 264 - 916 ng/dL    Testosterone, Free 5.6 (L) 6.6 - 18.1 pg/mL    Sex Hormone Binding Globulin 33.1 19.3 - 76.4 nmol/L   Uric Acid   Result Value Ref Range    Uric Acid 3.0 (L) 3.4 - 7.0 mg/dL   Vitamin D 25 Hydroxy   Result Value Ref Range    25 Hydroxy, Vitamin D 48.1 30.0 - 100.0 ng/mL   Comprehensive Metabolic Panel   Result Value Ref Range    Glucose 146 (H) 65 - 99 mg/dL    BUN 17 8 - 23 mg/dL    Creatinine 0.96 0.76 - 1.27 mg/dL    eGFR Non African Am 78 >60 mL/min/1.73    eGFR African Am 95 >60  mL/min/1.73    BUN/Creatinine Ratio 17.7 7.0 - 25.0    Sodium 136 136 - 145 mmol/L    Potassium 4.0 3.5 - 5.2 mmol/L    Chloride 95 (L) 98 - 107 mmol/L    Total CO2 28.8 22.0 - 29.0 mmol/L    Calcium 9.1 8.6 - 10.5 mg/dL    Total Protein 6.9 6.0 - 8.5 g/dL    Albumin 4.70 3.50 - 5.20 g/dL    Globulin 2.2 gm/dL    A/G Ratio 2.1 g/dL    Total Bilirubin 0.9 0.1 - 1.2 mg/dL    Alkaline Phosphatase 30 (L) 39 - 117 U/L    AST (SGOT) 20 1 - 40 U/L    ALT (SGPT) 27 1 - 41 U/L   C-Peptide   Result Value Ref Range    C-Peptide 2.9 1.1 - 4.4 ng/mL   Hemoglobin A1c   Result Value Ref Range    Hemoglobin A1C 6.30 (H) 4.80 - 5.60 %   Lipid Panel   Result Value Ref Range    Total Cholesterol 127 0 - 200 mg/dL    Triglycerides 151 (H) 0 - 150 mg/dL    HDL Cholesterol 51 40 - 60 mg/dL    VLDL Cholesterol 30.2 5 - 40 mg/dL    LDL Cholesterol  46 0 - 100 mg/dL   PSA   Result Value Ref Range    PSA 0.788 0.000 - 4.000 ng/mL   MicroAlbumin, Urine, Random   Result Value Ref Range    Microalbumin, Urine 9.3 Not Estab. ug/mL   Cardiovascular Risk Assessment   Result Value Ref Range    Interpretation Note    Diabetes Patient Education   Result Value Ref Range    PDF Image Not applicable    CBC & Differential   Result Value Ref Range    WBC 10.50 4.50 - 10.70 10*3/mm3    RBC 4.80 4.60 - 6.00 10*6/mm3    Hemoglobin 15.6 13.7 - 17.6 g/dL    Hematocrit 46.4 40.4 - 52.2 %    MCV 96.7 (H) 79.8 - 96.2 fL    MCH 32.5 27.0 - 32.7 pg    MCHC 33.6 32.6 - 36.4 g/dL    RDW 14.1 11.5 - 14.5 %    Platelets 288 140 - 500 10*3/mm3    Neutrophil Rel % 70.9 42.7 - 76.0 %    Lymphocyte Rel % 15.4 (L) 19.6 - 45.3 %    Monocyte Rel % 10.4 5.0 - 12.0 %    Eosinophil Rel % 2.6 0.3 - 6.2 %    Basophil Rel % 0.3 0.0 - 1.5 %    Neutrophils Absolute 7.45 1.90 - 8.10 10*3/mm3    Lymphocytes Absolute 1.62 0.90 - 4.80 10*3/mm3    Monocytes Absolute 1.09 0.20 - 1.20 10*3/mm3    Eosinophils Absolute 0.27 0.00 - 0.70 10*3/mm3    Basophils Absolute 0.03 0.00 - 0.20 10*3/mm3     Immature Granulocyte Rel % 0.4 0.0 - 0.5 %    Immature Grans Absolute 0.04 (H) 0.00 - 0.03 10*3/mm3         Assessment/Plan   Problems Addressed this Visit        Cardiovascular and Mediastinum    Hyperlipidemia    Hypertension       Digestive    Vitamin D deficiency       Endocrine    Hypogonadism in male    Diabetes mellitus type 2, uncontrolled - Primary       Other    RESOLVED: Hypercalcemia          In summary, patient was seen and examined.  He will continue all his current medications as prescribed.  He will request a formulary that his insurance coming to find out which testosterone would be covered.  It is up to him if he wants to continue testosterone therapy.  Patient was educated that this is an optional therapy.  His hemoglobin A1c is in satisfactory range.  Metabolically he is stable.  He is slightly hypertensive at today's visit I have advised him to monitor his blood pressure.  He will follow-up in 6 months with labs prior.  I've encouraged him to contact the office should he have any questions or concerns prior to then.       Let office know preference on testosterone according to your insurance formulary.  Continue all other meds  Follow up as scheduled  Monitor bp

## 2018-01-30 NOTE — PATIENT INSTRUCTIONS
Let office know preference on testosterone according to your insurance formulary.  Continue all other meds  Follow up as scheduled  Monitor bp

## 2018-01-31 RX ORDER — FENOFIBRATE 145 MG/1
145 TABLET, COATED ORAL DAILY
Qty: 90 TABLET | Refills: 1 | Status: SHIPPED | OUTPATIENT
Start: 2018-01-31 | End: 2018-07-18 | Stop reason: SDUPTHER

## 2018-01-31 RX ORDER — ICOSAPENT ETHYL 1000 MG/1
CAPSULE ORAL
Qty: 360 CAPSULE | Refills: 1 | Status: SHIPPED | OUTPATIENT
Start: 2018-01-31 | End: 2018-07-18 | Stop reason: SDUPTHER

## 2018-01-31 RX ORDER — ERGOCALCIFEROL 1.25 MG/1
CAPSULE ORAL
Qty: 26 CAPSULE | Refills: 1 | Status: SHIPPED | OUTPATIENT
Start: 2018-01-31 | End: 2018-07-18 | Stop reason: SDUPTHER

## 2018-01-31 RX ORDER — ROSUVASTATIN CALCIUM 20 MG/1
20 TABLET, COATED ORAL DAILY
Qty: 90 TABLET | Refills: 1 | Status: SHIPPED | OUTPATIENT
Start: 2018-01-31 | End: 2018-07-18 | Stop reason: SDUPTHER

## 2018-01-31 RX ORDER — SITAGLIPTIN 100 MG/1
100 TABLET, FILM COATED ORAL DAILY
Qty: 90 TABLET | Refills: 1 | Status: SHIPPED | OUTPATIENT
Start: 2018-01-31 | End: 2018-02-13 | Stop reason: SDUPTHER

## 2018-01-31 RX ORDER — LOSARTAN POTASSIUM AND HYDROCHLOROTHIAZIDE 25; 100 MG/1; MG/1
1 TABLET ORAL DAILY
Qty: 90 TABLET | Refills: 1 | Status: SHIPPED | OUTPATIENT
Start: 2018-01-31 | End: 2018-02-19 | Stop reason: SDUPTHER

## 2018-01-31 RX ORDER — PIOGLITAZONE HCL AND METFORMIN HCL 850; 15 MG/1; MG/1
TABLET ORAL
Qty: 270 TABLET | Refills: 1 | Status: SHIPPED | OUTPATIENT
Start: 2018-01-31 | End: 2018-07-18 | Stop reason: SDUPTHER

## 2018-02-13 RX ORDER — SITAGLIPTIN 100 MG/1
100 TABLET, FILM COATED ORAL DAILY
Qty: 90 TABLET | Refills: 0 | Status: SHIPPED | OUTPATIENT
Start: 2018-02-13 | End: 2018-07-18 | Stop reason: SDUPTHER

## 2018-02-14 RX ORDER — SITAGLIPTIN 100 MG/1
TABLET, FILM COATED ORAL
Qty: 90 TABLET | Refills: 1 | Status: SHIPPED | OUTPATIENT
Start: 2018-02-14 | End: 2018-07-18 | Stop reason: SDUPTHER

## 2018-02-14 RX ORDER — PIOGLITAZONE HCL AND METFORMIN HCL 850; 15 MG/1; MG/1
TABLET ORAL
Qty: 270 TABLET | Refills: 1 | Status: SHIPPED | OUTPATIENT
Start: 2018-02-14 | End: 2018-07-18 | Stop reason: SDUPTHER

## 2018-02-19 RX ORDER — TESTOSTERONE 10 MG/.5G
GEL, METERED TOPICAL
Qty: 180 G | Refills: 0 | OUTPATIENT
Start: 2018-02-19 | End: 2018-03-08 | Stop reason: SDUPTHER

## 2018-02-19 RX ORDER — LOSARTAN POTASSIUM AND HYDROCHLOROTHIAZIDE 25; 100 MG/1; MG/1
1 TABLET ORAL DAILY
Qty: 90 TABLET | Refills: 1 | Status: SHIPPED | OUTPATIENT
Start: 2018-02-19 | End: 2018-07-18 | Stop reason: SDUPTHER

## 2018-03-08 RX ORDER — TESTOSTERONE 10 MG/.5G
GEL, METERED TOPICAL
Qty: 180 G | Refills: 0 | OUTPATIENT
Start: 2018-03-08 | End: 2018-07-18 | Stop reason: SDUPTHER

## 2018-03-09 ENCOUNTER — PRIOR AUTHORIZATION (OUTPATIENT)
Dept: ENDOCRINOLOGY | Age: 68
End: 2018-03-09

## 2018-03-09 NOTE — TELEPHONE ENCOUNTER
PT'S PA FOR TESTOSTERONE 2% GEL WAS SUBMITTED TO [facility] ON 3/9/18 AND I WAS INFORMED THAT A PA WAS ALREADY ON FILE. PHARMACY WAS NOTIFIED.

## 2018-03-20 ENCOUNTER — PRIOR AUTHORIZATION (OUTPATIENT)
Dept: ENDOCRINOLOGY | Age: 68
End: 2018-03-20

## 2018-06-27 DIAGNOSIS — IMO0002 UNCONTROLLED TYPE 2 DIABETES MELLITUS WITH OTHER SPECIFIED COMPLICATION, WITHOUT LONG-TERM CURRENT USE OF INSULIN: Primary | ICD-10-CM

## 2018-06-27 DIAGNOSIS — E29.1 HYPOGONADISM IN MALE: ICD-10-CM

## 2018-06-27 DIAGNOSIS — E55.9 VITAMIN D DEFICIENCY: ICD-10-CM

## 2018-06-27 DIAGNOSIS — N40.0 BPH WITHOUT OBSTRUCTION/LOWER URINARY TRACT SYMPTOMS: ICD-10-CM

## 2018-07-03 ENCOUNTER — LAB (OUTPATIENT)
Dept: ENDOCRINOLOGY | Age: 68
End: 2018-07-03

## 2018-07-03 DIAGNOSIS — E55.9 VITAMIN D DEFICIENCY: ICD-10-CM

## 2018-07-03 DIAGNOSIS — E29.1 HYPOGONADISM IN MALE: ICD-10-CM

## 2018-07-03 DIAGNOSIS — N40.0 BPH WITHOUT OBSTRUCTION/LOWER URINARY TRACT SYMPTOMS: ICD-10-CM

## 2018-07-03 DIAGNOSIS — IMO0002 UNCONTROLLED TYPE 2 DIABETES MELLITUS WITH OTHER SPECIFIED COMPLICATION, WITHOUT LONG-TERM CURRENT USE OF INSULIN: ICD-10-CM

## 2018-07-06 LAB
25(OH)D3+25(OH)D2 SERPL-MCNC: 37 NG/ML (ref 30–100)
ALBUMIN SERPL-MCNC: 4.9 G/DL (ref 3.5–5.2)
ALBUMIN/GLOB SERPL: 2.5 G/DL
ALP SERPL-CCNC: 28 U/L (ref 39–117)
ALT SERPL-CCNC: 22 U/L (ref 1–41)
AST SERPL-CCNC: 16 U/L (ref 1–40)
BILIRUB SERPL-MCNC: 0.6 MG/DL (ref 0.1–1.2)
BUN SERPL-MCNC: 25 MG/DL (ref 8–23)
BUN/CREAT SERPL: 26.3 (ref 7–25)
C PEPTIDE SERPL-MCNC: 3.4 NG/ML (ref 1.1–4.4)
CALCIUM SERPL-MCNC: 9.5 MG/DL (ref 8.6–10.5)
CHLORIDE SERPL-SCNC: 99 MMOL/L (ref 98–107)
CHOLEST SERPL-MCNC: 127 MG/DL (ref 0–200)
CO2 SERPL-SCNC: 28.7 MMOL/L (ref 22–29)
CREAT SERPL-MCNC: 0.95 MG/DL (ref 0.76–1.27)
GLOBULIN SER CALC-MCNC: 2 GM/DL
GLUCOSE SERPL-MCNC: 122 MG/DL (ref 65–99)
HBA1C MFR BLD: 6 % (ref 4.8–5.6)
HCT VFR BLD AUTO: 46 % (ref 40.4–52.2)
HDLC SERPL-MCNC: 47 MG/DL (ref 40–60)
HGB BLD-MCNC: 15.3 G/DL (ref 13.7–17.6)
INTERPRETATION: NORMAL
LDLC SERPL CALC-MCNC: 49 MG/DL (ref 0–100)
Lab: NORMAL
MICROALBUMIN UR-MCNC: 4.7 UG/ML
POTASSIUM SERPL-SCNC: 4.7 MMOL/L (ref 3.5–5.2)
PROT SERPL-MCNC: 6.9 G/DL (ref 6–8.5)
PSA SERPL-MCNC: 0.66 NG/ML (ref 0–4)
SHBG SERPL-SCNC: 35.3 NMOL/L (ref 19.3–76.4)
SODIUM SERPL-SCNC: 139 MMOL/L (ref 136–145)
TESTOST FREE SERPL-MCNC: 3.6 PG/ML (ref 6.6–18.1)
TESTOST SERPL-MCNC: 173 NG/DL (ref 264–916)
TRIGL SERPL-MCNC: 154 MG/DL (ref 0–150)
VLDLC SERPL CALC-MCNC: 30.8 MG/DL (ref 5–40)

## 2018-07-18 ENCOUNTER — OFFICE VISIT (OUTPATIENT)
Dept: ENDOCRINOLOGY | Age: 68
End: 2018-07-18

## 2018-07-18 DIAGNOSIS — E11.9 CONTROLLED TYPE 2 DIABETES MELLITUS WITHOUT COMPLICATION, WITHOUT LONG-TERM CURRENT USE OF INSULIN (HCC): Primary | ICD-10-CM

## 2018-07-18 DIAGNOSIS — E55.9 VITAMIN D DEFICIENCY: ICD-10-CM

## 2018-07-18 DIAGNOSIS — E78.2 MIXED HYPERLIPIDEMIA: ICD-10-CM

## 2018-07-18 DIAGNOSIS — E29.1 HYPOGONADISM IN MALE: ICD-10-CM

## 2018-07-18 DIAGNOSIS — I10 ESSENTIAL HYPERTENSION: ICD-10-CM

## 2018-07-18 PROCEDURE — 99214 OFFICE O/P EST MOD 30 MIN: CPT | Performed by: NURSE PRACTITIONER

## 2018-07-18 RX ORDER — PIOGLITAZONE HCL AND METFORMIN HCL 850; 15 MG/1; MG/1
TABLET ORAL
Qty: 270 TABLET | Refills: 1 | Status: SHIPPED | OUTPATIENT
Start: 2018-07-18 | End: 2018-12-11 | Stop reason: SDUPTHER

## 2018-07-18 RX ORDER — ROSUVASTATIN CALCIUM 20 MG/1
20 TABLET, COATED ORAL DAILY
Qty: 90 TABLET | Refills: 1 | Status: SHIPPED | OUTPATIENT
Start: 2018-07-18 | End: 2018-12-11 | Stop reason: SDUPTHER

## 2018-07-18 RX ORDER — FENOFIBRATE 145 MG/1
145 TABLET, COATED ORAL DAILY
Qty: 90 TABLET | Refills: 1 | Status: SHIPPED | OUTPATIENT
Start: 2018-07-18 | End: 2018-09-24 | Stop reason: SDUPTHER

## 2018-07-18 RX ORDER — LOSARTAN POTASSIUM AND HYDROCHLOROTHIAZIDE 25; 100 MG/1; MG/1
1 TABLET ORAL DAILY
Qty: 90 TABLET | Refills: 1 | Status: SHIPPED | OUTPATIENT
Start: 2018-07-18 | End: 2019-01-18 | Stop reason: SDUPTHER

## 2018-07-18 RX ORDER — SITAGLIPTIN 100 MG/1
100 TABLET, FILM COATED ORAL DAILY
Qty: 90 TABLET | Refills: 1 | Status: SHIPPED | OUTPATIENT
Start: 2018-07-18 | End: 2019-01-18 | Stop reason: SDUPTHER

## 2018-07-18 RX ORDER — ICOSAPENT ETHYL 1000 MG/1
CAPSULE ORAL
Qty: 360 CAPSULE | Refills: 1 | Status: SHIPPED | OUTPATIENT
Start: 2018-07-18 | End: 2019-01-18 | Stop reason: SDUPTHER

## 2018-07-18 RX ORDER — ERGOCALCIFEROL 1.25 MG/1
CAPSULE ORAL
Qty: 26 CAPSULE | Refills: 1 | Status: SHIPPED | OUTPATIENT
Start: 2018-07-18 | End: 2018-12-10 | Stop reason: SDUPTHER

## 2018-07-18 RX ORDER — TESTOSTERONE 10 MG/.5G
GEL, METERED TOPICAL
Qty: 180 G | Refills: 0 | Status: SHIPPED | OUTPATIENT
Start: 2018-07-18 | End: 2018-11-12 | Stop reason: SDUPTHER

## 2018-07-18 NOTE — PROGRESS NOTES
Kalie Morales is a 68 y.o. male is here today for follow-up.  Chief Complaint   Patient presents with   • Diabetes     denies problems or concerns; lab review; not checking BG   • Hyperlipidemia   • Vitamin D Deficiency   • Hypogonadism     There were no vitals taken for this visit.  Current Outpatient Prescriptions on File Prior to Visit   Medication Sig   • aspirin 81 MG disintegrating tablet Take  by mouth.   • fenofibrate (TRICOR) 145 MG tablet Take 1 tablet by mouth Daily.   • JANUVIA 100 MG tablet TAKE ONE TABLET BY MOUTH ONCE DAILY   • losartan-hydrochlorothiazide (HYZAAR) 100-25 MG per tablet Take 1 tablet by mouth Daily.   • pioglitazone-metFORMIN (ACTOPLUS MET)  MG per tablet 1 in am and 2 in pm   • rosuvastatin (CRESTOR) 20 MG tablet Take 1 tablet by mouth Daily.   • Testosterone 10 MG/ACT (2%) gel 3 pump actuation to each inner thighs daily   • VASCEPA 1 g capsule capsule 2 po bid   • vitamin b complex (TOTAL B/C) tablet tablet Take  by mouth.   • vitamin D (ERGOCALCIFEROL) 20984 units capsule capsule 2 po q week   • [DISCONTINUED] JANUVIA 100 MG tablet Take 1 tablet by mouth Daily.   • [DISCONTINUED] pioglitazone-metFORMIN (ACTOPLUS MET)  MG per tablet TAKE ONE TABLET BY MOUTH IN THE MORNING AND TWO TABLETS IN THE EVENING     No current facility-administered medications on file prior to visit.      History reviewed. No pertinent family history.  Social History   Substance Use Topics   • Smoking status: Never Smoker   • Smokeless tobacco: Not on file   • Alcohol use Yes     Allergies   Allergen Reactions   • No Known Drug Allergy          History of Present Illness  Encounter Diagnoses   Name Primary?   • Mixed hyperlipidemia    • Essential hypertension    • Vitamin D deficiency    • Uncontrolled type 2 diabetes mellitus with other specified complication, without long-term current use of insulin (CMS/[facility]) Yes   • Hypogonadism in male      68-year-old male patient here today for  routine follow-up visit.  He had recent labs which were reviewed and he was provided a copy.  He is needing medications refilled which were sent for his request.  A Ruy was reviewed and he was provided a written prescription for his testosterone which recently was approved by his insurance company since March.  He has been taking a daily and consistently.  His testosterone level is still in the low range.  We discussed other options however he does not want to take injections of testosterone.  He is taking all his other medication as prescribed.  He has no complaints at today's visit  The following portions of the patient's history were reviewed and updated as appropriate: allergies, current medications, past family history, past medical history, past social history, past surgical history and problem list.    Review of Systems   Constitutional: Negative for activity change.   Endocrine: Negative for cold intolerance and heat intolerance.   Psychiatric/Behavioral: Negative for sleep disturbance.       Objective   Physical Exam   Constitutional: He is oriented to person, place, and time. He appears well-developed and well-nourished. No distress.   HENT:   Head: Normocephalic and atraumatic.   Right Ear: External ear normal.   Left Ear: External ear normal.   Nose: Nose normal.   Mouth/Throat: Oropharynx is clear and moist. No oropharyngeal exudate.   Eyes: Pupils are equal, round, and reactive to light. Conjunctivae and EOM are normal. Right eye exhibits no discharge. Left eye exhibits no discharge. No scleral icterus.   Neck: Normal range of motion. Neck supple. No JVD present. No tracheal deviation present. No thyromegaly present.   Cardiovascular: Normal rate, regular rhythm, normal heart sounds and intact distal pulses.  Exam reveals no gallop and no friction rub.    No murmur heard.  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   Pulmonary/Chest: Effort normal and breath sounds normal. No stridor.  No respiratory distress. He has no wheezes. He has no rales. He exhibits no tenderness.   Abdominal: Soft. Bowel sounds are normal. He exhibits no distension and no mass. There is no tenderness. There is no rebound and no guarding. No hernia.   Musculoskeletal: Normal range of motion. He exhibits no edema, tenderness or deformity.   Lymphadenopathy:     He has no cervical adenopathy.   Neurological: He is alert and oriented to person, place, and time. He has normal reflexes. He displays normal reflexes. No cranial nerve deficit. He exhibits normal muscle tone. Coordination normal.   Skin: Skin is warm and dry. No rash noted. He is not diaphoretic. No erythema. No pallor.   Psychiatric: He has a normal mood and affect. His behavior is normal. Judgment and thought content normal.   Nursing note and vitals reviewed.    Results for orders placed or performed in visit on 07/03/18   Comprehensive Metabolic Panel   Result Value Ref Range    Glucose 122 (H) 65 - 99 mg/dL    BUN 25 (H) 8 - 23 mg/dL    Creatinine 0.95 0.76 - 1.27 mg/dL    eGFR Non African Am 79 >60 mL/min/1.73    eGFR African Am 96 >60 mL/min/1.73    BUN/Creatinine Ratio 26.3 (H) 7.0 - 25.0    Sodium 139 136 - 145 mmol/L    Potassium 4.7 3.5 - 5.2 mmol/L    Chloride 99 98 - 107 mmol/L    Total CO2 28.7 22.0 - 29.0 mmol/L    Calcium 9.5 8.6 - 10.5 mg/dL    Total Protein 6.9 6.0 - 8.5 g/dL    Albumin 4.90 3.50 - 5.20 g/dL    Globulin 2.0 gm/dL    A/G Ratio 2.5 g/dL    Total Bilirubin 0.6 0.1 - 1.2 mg/dL    Alkaline Phosphatase 28 (L) 39 - 117 U/L    AST (SGOT) 16 1 - 40 U/L    ALT (SGPT) 22 1 - 41 U/L   C-Peptide   Result Value Ref Range    C-Peptide 3.4 1.1 - 4.4 ng/mL   Hemoglobin A1c   Result Value Ref Range    Hemoglobin A1C 6.00 (H) 4.80 - 5.60 %   Lipid Panel   Result Value Ref Range    Total Cholesterol 127 0 - 200 mg/dL    Triglycerides 154 (H) 0 - 150 mg/dL    HDL Cholesterol 47 40 - 60 mg/dL    VLDL Cholesterol 30.8 5 - 40 mg/dL    LDL Cholesterol  49  0 - 100 mg/dL   Hemoglobin & Hematocrit, Blood   Result Value Ref Range    Hemoglobin 15.3 13.7 - 17.6 g/dL    Hematocrit 46.0 40.4 - 52.2 %   PSA DIAGNOSTIC   Result Value Ref Range    PSA 0.664 0.000 - 4.000 ng/mL   TestT+TestF+SHBG   Result Value Ref Range    Testosterone, Total 173 (L) 264 - 916 ng/dL    Testosterone, Free 3.6 (L) 6.6 - 18.1 pg/mL    Sex Hormone Binding Globulin 35.3 19.3 - 76.4 nmol/L   Vitamin D 25 Hydroxy   Result Value Ref Range    25 Hydroxy, Vitamin D 37.0 30.0 - 100.0 ng/ml   MicroAlbumin, Urine, Random   Result Value Ref Range    Microalbumin, Urine 4.7 Not Estab. ug/mL   Cardiovascular Risk Assessment   Result Value Ref Range    Interpretation Note    Diabetes Patient Education   Result Value Ref Range    PDF Image Not applicable          Assessment/Plan   Problems Addressed this Visit        Cardiovascular and Mediastinum    Hyperlipidemia    Hypertension       Digestive    Vitamin D deficiency       Endocrine    Hypogonadism in male    Diabetes mellitus type 2, uncontrolled (CMS/[facility]) - Primary        In summary, patient was seen and examined.  His medication list was reviewed and updated.  He will continue on testosterone therapy as prescribed.  A new prescription was provided to him.  A Ruy was reviewed.  His other prescriptions were sent for 90 day refills per his request.  Metabolically he is stable.  His hemoglobin A1c reflects good diabetes control.  His cholesterol is well-controlled.  His blood pressure is slightly elevated the patient thinks this is situational.  He will follow-up in 6 months with Dr. Calvert or myself with labs prior.  I've encouraged him to contact the office should he have any questions or concerns prior to then

## 2018-09-24 RX ORDER — FENOFIBRATE 145 MG/1
TABLET, COATED ORAL
Qty: 90 TABLET | Refills: 3 | OUTPATIENT
Start: 2018-09-24

## 2018-09-24 RX ORDER — FENOFIBRATE 145 MG/1
145 TABLET, COATED ORAL DAILY
Qty: 90 TABLET | Refills: 0 | Status: SHIPPED | OUTPATIENT
Start: 2018-09-24 | End: 2019-01-18 | Stop reason: SDUPTHER

## 2018-11-12 RX ORDER — TESTOSTERONE 10 MG/.5G
GEL, METERED TOPICAL
Qty: 180 G | Refills: 0 | OUTPATIENT
Start: 2018-11-12 | End: 2018-12-11 | Stop reason: SDUPTHER

## 2018-12-11 RX ORDER — ROSUVASTATIN CALCIUM 20 MG/1
20 TABLET, COATED ORAL DAILY
Qty: 90 TABLET | Refills: 0 | Status: SHIPPED | OUTPATIENT
Start: 2018-12-11 | End: 2019-01-18 | Stop reason: SDUPTHER

## 2018-12-11 RX ORDER — ERGOCALCIFEROL 1.25 MG/1
CAPSULE ORAL
Qty: 25 CAPSULE | Refills: 0 | Status: SHIPPED | OUTPATIENT
Start: 2018-12-11 | End: 2019-01-18 | Stop reason: SDUPTHER

## 2018-12-11 RX ORDER — TESTOSTERONE 10 MG/.5G
GEL, METERED TOPICAL
Qty: 180 G | Refills: 0 | OUTPATIENT
Start: 2018-12-11 | End: 2019-01-18

## 2018-12-11 RX ORDER — PIOGLITAZONE HCL AND METFORMIN HCL 850; 15 MG/1; MG/1
TABLET ORAL
Qty: 270 TABLET | Refills: 0 | Status: SHIPPED | OUTPATIENT
Start: 2018-12-11 | End: 2019-01-18 | Stop reason: SDUPTHER

## 2018-12-26 DIAGNOSIS — E29.1 HYPOGONADISM IN MALE: ICD-10-CM

## 2018-12-26 DIAGNOSIS — E55.9 VITAMIN D DEFICIENCY: ICD-10-CM

## 2018-12-26 DIAGNOSIS — E11.9 CONTROLLED TYPE 2 DIABETES MELLITUS WITHOUT COMPLICATION, WITHOUT LONG-TERM CURRENT USE OF INSULIN (HCC): ICD-10-CM

## 2018-12-26 DIAGNOSIS — E78.2 MIXED HYPERLIPIDEMIA: Primary | ICD-10-CM

## 2018-12-26 DIAGNOSIS — N40.0 BENIGN NON-NODULAR PROSTATIC HYPERPLASIA WITHOUT LOWER URINARY TRACT SYMPTOMS: ICD-10-CM

## 2019-01-04 ENCOUNTER — LAB (OUTPATIENT)
Dept: ENDOCRINOLOGY | Age: 69
End: 2019-01-04

## 2019-01-04 DIAGNOSIS — N40.0 BENIGN NON-NODULAR PROSTATIC HYPERPLASIA WITHOUT LOWER URINARY TRACT SYMPTOMS: ICD-10-CM

## 2019-01-04 DIAGNOSIS — E78.2 MIXED HYPERLIPIDEMIA: ICD-10-CM

## 2019-01-04 DIAGNOSIS — E11.9 CONTROLLED TYPE 2 DIABETES MELLITUS WITHOUT COMPLICATION, WITHOUT LONG-TERM CURRENT USE OF INSULIN (HCC): ICD-10-CM

## 2019-01-04 DIAGNOSIS — E55.9 VITAMIN D DEFICIENCY: ICD-10-CM

## 2019-01-04 DIAGNOSIS — E29.1 HYPOGONADISM IN MALE: ICD-10-CM

## 2019-01-05 LAB
25(OH)D3+25(OH)D2 SERPL-MCNC: 43.1 NG/ML (ref 30–100)
ALBUMIN SERPL-MCNC: 4.4 G/DL (ref 3.5–5.2)
ALBUMIN/GLOB SERPL: 2.9 G/DL
ALP SERPL-CCNC: 29 U/L (ref 39–117)
ALT SERPL-CCNC: 24 U/L (ref 1–41)
AST SERPL-CCNC: 23 U/L (ref 1–40)
BILIRUB SERPL-MCNC: 0.7 MG/DL (ref 0.1–1.2)
BUN SERPL-MCNC: 20 MG/DL (ref 8–23)
BUN/CREAT SERPL: 23.8 (ref 7–25)
C PEPTIDE SERPL-MCNC: 3.3 NG/ML (ref 1.1–4.4)
CALCIUM SERPL-MCNC: 9.7 MG/DL (ref 8.6–10.5)
CHLORIDE SERPL-SCNC: 99 MMOL/L (ref 98–107)
CHOLEST SERPL-MCNC: 110 MG/DL (ref 0–200)
CO2 SERPL-SCNC: 27.7 MMOL/L (ref 22–29)
CREAT SERPL-MCNC: 0.84 MG/DL (ref 0.76–1.27)
GLOBULIN SER CALC-MCNC: 1.5 GM/DL
GLUCOSE SERPL-MCNC: 137 MG/DL (ref 65–99)
HBA1C MFR BLD: 6.1 % (ref 4.8–5.6)
HCT VFR BLD AUTO: 43.2 % (ref 40.4–52.2)
HDLC SERPL-MCNC: 41 MG/DL (ref 40–60)
HGB BLD-MCNC: 14.9 G/DL (ref 13.7–17.6)
INTERPRETATION: NORMAL
LDLC SERPL CALC-MCNC: 47 MG/DL (ref 0–100)
Lab: NORMAL
MICROALBUMIN UR-MCNC: 9.1 UG/ML
POTASSIUM SERPL-SCNC: 4.9 MMOL/L (ref 3.5–5.2)
PROT SERPL-MCNC: 5.9 G/DL (ref 6–8.5)
PSA SERPL-MCNC: 0.64 NG/ML (ref 0–4)
SHBG SERPL-SCNC: 38.7 NMOL/L (ref 19.3–76.4)
SODIUM SERPL-SCNC: 139 MMOL/L (ref 136–145)
TESTOST FREE SERPL-MCNC: 3.9 PG/ML (ref 6.6–18.1)
TESTOST SERPL-MCNC: 264 NG/DL (ref 264–916)
TRIGL SERPL-MCNC: 110 MG/DL (ref 0–150)
VLDLC SERPL CALC-MCNC: 22 MG/DL (ref 5–40)

## 2019-01-18 ENCOUNTER — OFFICE VISIT (OUTPATIENT)
Dept: ENDOCRINOLOGY | Age: 69
End: 2019-01-18

## 2019-01-18 VITALS
DIASTOLIC BLOOD PRESSURE: 82 MMHG | SYSTOLIC BLOOD PRESSURE: 132 MMHG | HEIGHT: 68 IN | BODY MASS INDEX: 33.8 KG/M2 | WEIGHT: 223 LBS

## 2019-01-18 DIAGNOSIS — E55.9 VITAMIN D DEFICIENCY: ICD-10-CM

## 2019-01-18 DIAGNOSIS — E29.1 HYPOGONADISM IN MALE: ICD-10-CM

## 2019-01-18 DIAGNOSIS — E11.9 CONTROLLED TYPE 2 DIABETES MELLITUS WITHOUT COMPLICATION, WITHOUT LONG-TERM CURRENT USE OF INSULIN (HCC): Primary | ICD-10-CM

## 2019-01-18 DIAGNOSIS — E78.2 MIXED HYPERLIPIDEMIA: ICD-10-CM

## 2019-01-18 DIAGNOSIS — I10 ESSENTIAL HYPERTENSION: ICD-10-CM

## 2019-01-18 PROCEDURE — 99214 OFFICE O/P EST MOD 30 MIN: CPT | Performed by: NURSE PRACTITIONER

## 2019-01-18 RX ORDER — ICOSAPENT ETHYL 1000 MG/1
CAPSULE ORAL
Qty: 360 CAPSULE | Refills: 1 | Status: SHIPPED | OUTPATIENT
Start: 2019-01-18 | End: 2019-07-19 | Stop reason: SDUPTHER

## 2019-01-18 RX ORDER — PIOGLITAZONE HCL AND METFORMIN HCL 850; 15 MG/1; MG/1
TABLET ORAL
Qty: 270 TABLET | Refills: 1 | Status: SHIPPED | OUTPATIENT
Start: 2019-01-18 | End: 2019-07-19 | Stop reason: SDUPTHER

## 2019-01-18 RX ORDER — ROSUVASTATIN CALCIUM 20 MG/1
20 TABLET, COATED ORAL DAILY
Qty: 90 TABLET | Refills: 1 | Status: SHIPPED | OUTPATIENT
Start: 2019-01-18 | End: 2019-07-19 | Stop reason: SDUPTHER

## 2019-01-18 RX ORDER — FENOFIBRATE 145 MG/1
145 TABLET, COATED ORAL DAILY
Qty: 90 TABLET | Refills: 1 | Status: SHIPPED | OUTPATIENT
Start: 2019-01-18 | End: 2019-07-19 | Stop reason: SDUPTHER

## 2019-01-18 RX ORDER — LOSARTAN POTASSIUM AND HYDROCHLOROTHIAZIDE 25; 100 MG/1; MG/1
1 TABLET ORAL DAILY
Qty: 90 TABLET | Refills: 1 | Status: SHIPPED | OUTPATIENT
Start: 2019-01-18 | End: 2019-07-19 | Stop reason: SDUPTHER

## 2019-01-18 RX ORDER — SITAGLIPTIN 100 MG/1
100 TABLET, FILM COATED ORAL DAILY
Qty: 90 TABLET | Refills: 1 | Status: SHIPPED | OUTPATIENT
Start: 2019-01-18 | End: 2019-07-19 | Stop reason: SDUPTHER

## 2019-01-18 RX ORDER — ERGOCALCIFEROL 1.25 MG/1
CAPSULE ORAL
Qty: 24 CAPSULE | Refills: 1 | Status: SHIPPED | OUTPATIENT
Start: 2019-01-18 | End: 2019-06-24 | Stop reason: SDUPTHER

## 2019-01-18 NOTE — PROGRESS NOTES
"Kalie Morales is a 68 y.o. male is here today for follow-up.  Chief Complaint   Patient presents with   • Diabetes     recent labs, pt is not testing BG, pt did not bring meter   • Hypertension     pt states that his testosterone will no longer be covered   • Hyperlipidemia   • Hypogonadism   • Vitamin D Deficiency     /82   Ht 172.7 cm (68\")   Wt 101 kg (223 lb)   BMI 33.91 kg/m²   Current Outpatient Medications on File Prior to Visit   Medication Sig   • aspirin 81 MG disintegrating tablet Take  by mouth.   • fenofibrate (TRICOR) 145 MG tablet Take 1 tablet by mouth Daily.   • JANUVIA 100 MG tablet Take 1 tablet by mouth Daily.   • losartan-hydrochlorothiazide (HYZAAR) 100-25 MG per tablet Take 1 tablet by mouth Daily.   • pioglitazone-metFORMIN (ACTOPLUS MET)  MG per tablet 1 in am and 2 in pm   • rosuvastatin (CRESTOR) 20 MG tablet Take 1 tablet by mouth Daily.   • Testosterone 10 MG/ACT (2%) gel 3 pump actuation to each inner thighs daily   • VASCEPA 1 g capsule capsule 2 po bid   • vitamin b complex (TOTAL B/C) tablet tablet Take  by mouth.   • vitamin D (ERGOCALCIFEROL) 34150 units capsule capsule TAKE 2 CAPSULES BY MOUTH ONCE WEEKLY     No current facility-administered medications on file prior to visit.      History reviewed. No pertinent family history.  Social History     Tobacco Use   • Smoking status: Never Smoker   Substance Use Topics   • Alcohol use: Yes   • Drug use: Defer     68 year old male patient here today for routine follow up for the above mentioned medical problems. He does not have any new complaints or problems a this time. He reports feeling well at this time and denies symptoms of hyper or hypoglycemia.  He reports taking all medications as prescribed. He states that his insurance may longer cover his testosterone therapy and he is going to try to see how he feels without it since he has not noticed feeling any different since he's been using the medication  I " have discussed with him the risk and benefits of using testosterone therapy.    History of Present Illness    The following portions of the patient's history were reviewed and updated as appropriate: allergies, current medications, past family history, past medical history, past social history, past surgical history and problem list.    Review of Systems   Constitutional: Negative for fatigue.   HENT: Negative for trouble swallowing.    Eyes: Negative for visual disturbance.   Cardiovascular: Negative for leg swelling.   Endocrine: Negative for polyuria.   Skin: Negative for wound.   Neurological: Negative for numbness.       Objective   Physical Exam   Constitutional: He is oriented to person, place, and time. He appears well-developed and well-nourished. No distress.   HENT:   Head: Normocephalic and atraumatic.   Right Ear: External ear normal.   Left Ear: External ear normal.   Nose: Nose normal.   Mouth/Throat: No oropharyngeal exudate.   Eyes: Conjunctivae and EOM are normal. Pupils are equal, round, and reactive to light. Right eye exhibits no discharge. Left eye exhibits no discharge. No scleral icterus.   Neck: Normal range of motion. Neck supple. No tracheal deviation present. No thyromegaly present.   Cardiovascular: Normal rate, regular rhythm, normal heart sounds and intact distal pulses. Exam reveals no gallop and no friction rub.   No murmur heard.  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   Pulmonary/Chest: Effort normal and breath sounds normal. No stridor. No respiratory distress. He has no wheezes. He has no rales. He exhibits no tenderness.   Abdominal: Soft. Bowel sounds are normal. He exhibits no distension and no mass. There is no tenderness. There is no rebound and no guarding. No hernia.   Musculoskeletal: Normal range of motion. He exhibits no edema, tenderness or deformity.   Lymphadenopathy:     He has no cervical adenopathy.   Neurological: He is alert and oriented to  person, place, and time. Coordination normal.   Skin: Skin is warm and dry. Capillary refill takes less than 2 seconds. No rash noted. He is not diaphoretic. No erythema. No pallor.   Psychiatric: He has a normal mood and affect. His behavior is normal. Judgment and thought content normal.   Nursing note and vitals reviewed.    Results for orders placed or performed in visit on 01/04/19   TestT+TestF+SHBG   Result Value Ref Range    Testosterone, Total 264 264 - 916 ng/dL    Testosterone, Free 3.9 (L) 6.6 - 18.1 pg/mL    Sex Hormone Binding Globulin 38.7 19.3 - 76.4 nmol/L   Hemoglobin & Hematocrit, Blood   Result Value Ref Range    Hemoglobin 14.9 13.7 - 17.6 g/dL    Hematocrit 43.2 40.4 - 52.2 %   PSA DIAGNOSTIC   Result Value Ref Range    PSA 0.640 0.000 - 4.000 ng/mL   Hemoglobin A1c   Result Value Ref Range    Hemoglobin A1C 6.10 (H) 4.80 - 5.60 %   C-Peptide   Result Value Ref Range    C-Peptide 3.3 1.1 - 4.4 ng/mL   Vitamin D 25 Hydroxy   Result Value Ref Range    25 Hydroxy, Vitamin D 43.1 30.0 - 100.0 ng/ml   Lipid Panel   Result Value Ref Range    Total Cholesterol 110 0 - 200 mg/dL    Triglycerides 110 0 - 150 mg/dL    HDL Cholesterol 41 40 - 60 mg/dL    VLDL Cholesterol 22 5 - 40 mg/dL    LDL Cholesterol  47 0 - 100 mg/dL   Comprehensive Metabolic Panel   Result Value Ref Range    Glucose 137 (H) 65 - 99 mg/dL    BUN 20 8 - 23 mg/dL    Creatinine 0.84 0.76 - 1.27 mg/dL    eGFR Non African Am 91 >60 mL/min/1.73    eGFR African Am 110 >60 mL/min/1.73    BUN/Creatinine Ratio 23.8 7.0 - 25.0    Sodium 139 136 - 145 mmol/L    Potassium 4.9 3.5 - 5.2 mmol/L    Chloride 99 98 - 107 mmol/L    Total CO2 27.7 22.0 - 29.0 mmol/L    Calcium 9.7 8.6 - 10.5 mg/dL    Total Protein 5.9 (L) 6.0 - 8.5 g/dL    Albumin 4.40 3.50 - 5.20 g/dL    Globulin 1.5 gm/dL    A/G Ratio 2.9 g/dL    Total Bilirubin 0.7 0.1 - 1.2 mg/dL    Alkaline Phosphatase 29 (L) 39 - 117 U/L    AST (SGOT) 23 1 - 40 U/L    ALT (SGPT) 24 1 - 41 U/L    MicroAlbumin, Urine, Random -   Result Value Ref Range    Microalbumin, Urine 9.1 Not Estab. ug/mL   Cardiovascular Risk Assessment   Result Value Ref Range    Interpretation Note    Diabetes Patient Education   Result Value Ref Range    PDF Image Not applicable          Assessment/Plan   Problems Addressed this Visit        Cardiovascular and Mediastinum    Hyperlipidemia    Hypertension       Digestive    Vitamin D deficiency       Endocrine    Hypogonadism in male    Controlled type 2 diabetes mellitus without complication, without long-term current use of insulin (CMS/[facility]) - Primary          In summary, the patient was seen and examined for the above medical problems.  His blood pressure is stable at today's visit.  He has had recent lab work and results were reviewed and a copy was provided to the patient.  His A1c is single range today.  His testosterone is on the low end of normal and when he is finished with his current prescription he has decided to stop taking it and seeing how he feels since his insurance may not cover it anymore. . I have advised him to contact the office if he feels like he needs to continue taking this medication. Continue all other current medications as prescribed  His lipids and vitamin D are within normal range at this time. He is to follow-up in 6 months with labs prior and has been advised to contact the office should he have any questions or concerns prior to visit.  He is metabolically stable. We did discuss cheaper options of testosterone therapy replacement thru compound pharmacy    F/u in 6 months with labs prior  Stop testosterone therapy and see how you feel

## 2019-06-24 RX ORDER — ERGOCALCIFEROL 1.25 MG/1
CAPSULE ORAL
Qty: 24 CAPSULE | Refills: 0 | Status: SHIPPED | OUTPATIENT
Start: 2019-06-24 | End: 2019-07-19 | Stop reason: SDUPTHER

## 2019-07-05 ENCOUNTER — LAB (OUTPATIENT)
Dept: ENDOCRINOLOGY | Age: 69
End: 2019-07-05

## 2019-07-05 DIAGNOSIS — N40.0 BENIGN NON-NODULAR PROSTATIC HYPERPLASIA WITHOUT LOWER URINARY TRACT SYMPTOMS: ICD-10-CM

## 2019-07-05 DIAGNOSIS — E11.9 CONTROLLED TYPE 2 DIABETES MELLITUS WITHOUT COMPLICATION, WITHOUT LONG-TERM CURRENT USE OF INSULIN (HCC): ICD-10-CM

## 2019-07-05 DIAGNOSIS — E78.2 MIXED HYPERLIPIDEMIA: Primary | ICD-10-CM

## 2019-07-05 DIAGNOSIS — E29.1 HYPOGONADISM IN MALE: ICD-10-CM

## 2019-07-05 DIAGNOSIS — E78.2 MIXED HYPERLIPIDEMIA: ICD-10-CM

## 2019-07-05 DIAGNOSIS — E55.9 VITAMIN D DEFICIENCY: ICD-10-CM

## 2019-07-06 LAB
25(OH)D3+25(OH)D2 SERPL-MCNC: 40.8 NG/ML (ref 30–100)
ALBUMIN SERPL-MCNC: 4.6 G/DL (ref 3.5–5.2)
ALBUMIN/GLOB SERPL: 2.4 G/DL
ALP SERPL-CCNC: 26 U/L (ref 39–117)
ALT SERPL-CCNC: 18 U/L (ref 1–41)
AST SERPL-CCNC: 18 U/L (ref 1–40)
BILIRUB SERPL-MCNC: 0.7 MG/DL (ref 0.2–1.2)
BUN SERPL-MCNC: 20 MG/DL (ref 8–23)
BUN/CREAT SERPL: 23 (ref 7–25)
C PEPTIDE SERPL-MCNC: 3 NG/ML (ref 1.1–4.4)
CALCIUM SERPL-MCNC: 9.5 MG/DL (ref 8.6–10.5)
CHLORIDE SERPL-SCNC: 97 MMOL/L (ref 98–107)
CHOLEST SERPL-MCNC: 116 MG/DL (ref 0–200)
CO2 SERPL-SCNC: 24.8 MMOL/L (ref 22–29)
CREAT SERPL-MCNC: 0.87 MG/DL (ref 0.76–1.27)
GLOBULIN SER CALC-MCNC: 1.9 GM/DL
GLUCOSE SERPL-MCNC: 120 MG/DL (ref 65–99)
HBA1C MFR BLD: 6.1 % (ref 4.8–5.6)
HCT VFR BLD AUTO: 43.4 % (ref 37.5–51)
HDLC SERPL-MCNC: 52 MG/DL (ref 40–60)
HGB BLD-MCNC: 14.2 G/DL (ref 13–17.7)
INTERPRETATION: NORMAL
LDLC SERPL CALC-MCNC: 45 MG/DL (ref 0–100)
Lab: NORMAL
POTASSIUM SERPL-SCNC: 4.4 MMOL/L (ref 3.5–5.2)
PROT SERPL-MCNC: 6.5 G/DL (ref 6–8.5)
PSA SERPL-MCNC: 1.08 NG/ML (ref 0–4)
SHBG SERPL-SCNC: 50.3 NMOL/L (ref 19.3–76.4)
SODIUM SERPL-SCNC: 135 MMOL/L (ref 136–145)
TESTOST FREE SERPL-MCNC: 5.1 PG/ML (ref 6.6–18.1)
TESTOST SERPL-MCNC: 320 NG/DL (ref 264–916)
TRIGL SERPL-MCNC: 94 MG/DL (ref 0–150)
VLDLC SERPL CALC-MCNC: 18.8 MG/DL

## 2019-07-19 ENCOUNTER — OFFICE VISIT (OUTPATIENT)
Dept: ENDOCRINOLOGY | Age: 69
End: 2019-07-19

## 2019-07-19 VITALS
BODY MASS INDEX: 33.65 KG/M2 | SYSTOLIC BLOOD PRESSURE: 130 MMHG | DIASTOLIC BLOOD PRESSURE: 70 MMHG | WEIGHT: 222 LBS | HEIGHT: 68 IN

## 2019-07-19 DIAGNOSIS — E29.1 HYPOGONADISM IN MALE: ICD-10-CM

## 2019-07-19 DIAGNOSIS — E78.2 MIXED HYPERLIPIDEMIA: ICD-10-CM

## 2019-07-19 DIAGNOSIS — R79.89 LOW TESTOSTERONE: ICD-10-CM

## 2019-07-19 DIAGNOSIS — E11.9 CONTROLLED TYPE 2 DIABETES MELLITUS WITHOUT COMPLICATION, WITHOUT LONG-TERM CURRENT USE OF INSULIN (HCC): Primary | ICD-10-CM

## 2019-07-19 DIAGNOSIS — E55.9 VITAMIN D DEFICIENCY: ICD-10-CM

## 2019-07-19 DIAGNOSIS — I10 ESSENTIAL HYPERTENSION: ICD-10-CM

## 2019-07-19 PROCEDURE — 99214 OFFICE O/P EST MOD 30 MIN: CPT | Performed by: NURSE PRACTITIONER

## 2019-07-19 RX ORDER — ERGOCALCIFEROL 1.25 MG/1
CAPSULE ORAL
Qty: 24 CAPSULE | Refills: 1 | Status: SHIPPED | OUTPATIENT
Start: 2019-07-19 | End: 2019-12-27 | Stop reason: SDUPTHER

## 2019-07-19 RX ORDER — FENOFIBRATE 145 MG/1
145 TABLET, COATED ORAL DAILY
Qty: 90 TABLET | Refills: 1 | Status: SHIPPED | OUTPATIENT
Start: 2019-07-19 | End: 2019-12-27 | Stop reason: SDUPTHER

## 2019-07-19 RX ORDER — TESTOSTERONE 10 MG/.5G
GEL, METERED TOPICAL
COMMUNITY
End: 2019-07-19

## 2019-07-19 RX ORDER — SITAGLIPTIN 100 MG/1
100 TABLET, FILM COATED ORAL DAILY
Qty: 90 TABLET | Refills: 1 | Status: SHIPPED | OUTPATIENT
Start: 2019-07-19 | End: 2019-12-27

## 2019-07-19 RX ORDER — ICOSAPENT ETHYL 1000 MG/1
CAPSULE ORAL
Qty: 360 CAPSULE | Refills: 1 | Status: SHIPPED | OUTPATIENT
Start: 2019-07-19 | End: 2019-12-27 | Stop reason: SDUPTHER

## 2019-07-19 RX ORDER — PIOGLITAZONE HCL AND METFORMIN HCL 850; 15 MG/1; MG/1
TABLET ORAL
Qty: 270 TABLET | Refills: 1 | Status: SHIPPED | OUTPATIENT
Start: 2019-07-19 | End: 2019-12-27 | Stop reason: SDUPTHER

## 2019-07-19 RX ORDER — ROSUVASTATIN CALCIUM 20 MG/1
20 TABLET, COATED ORAL DAILY
Qty: 90 TABLET | Refills: 1 | Status: SHIPPED | OUTPATIENT
Start: 2019-07-19 | End: 2019-12-27 | Stop reason: SDUPTHER

## 2019-07-19 RX ORDER — LOSARTAN POTASSIUM AND HYDROCHLOROTHIAZIDE 25; 100 MG/1; MG/1
1 TABLET ORAL DAILY
Qty: 90 TABLET | Refills: 1 | Status: SHIPPED | OUTPATIENT
Start: 2019-07-19 | End: 2019-10-15

## 2019-07-19 NOTE — PROGRESS NOTES
"Kalie Morales is a 69 y.o. male is here today for follow-up.  Chief Complaint   Patient presents with   • Diabetes     recent labs, testing BG zero times daily, pt did not bring meter   • Hyperlipidemia     pt is not taking testerone due to insurance no longer covering   • Hypertension   • Hypogonadism   • Vitamin D Deficiency     /70   Ht 172.7 cm (68\")   Wt 101 kg (222 lb)   BMI 33.75 kg/m²   Current Outpatient Medications on File Prior to Visit   Medication Sig   • aspirin 81 MG disintegrating tablet Take  by mouth.   • fenofibrate (TRICOR) 145 MG tablet Take 1 tablet by mouth Daily.   • JANUVIA 100 MG tablet Take 1 tablet by mouth Daily.   • losartan-hydrochlorothiazide (HYZAAR) 100-25 MG per tablet Take 1 tablet by mouth Daily.   • pioglitazone-metFORMIN (ACTOPLUS MET)  MG per tablet 1 in am and 2 in pm   • rosuvastatin (CRESTOR) 20 MG tablet Take 1 tablet by mouth Daily.   • VASCEPA 1 g capsule capsule 2 po bid   • vitamin b complex (TOTAL B/C) tablet tablet Take  by mouth.   • vitamin D (ERGOCALCIFEROL) 59668 units capsule capsule TAKE 2 CAPSULES BY MOUTH ONCE WEEKLY   • Testosterone 10 MG/ACT (2%) gel Place  on the skin as directed by provider.     No current facility-administered medications on file prior to visit.      History reviewed. No pertinent family history.  Social History     Tobacco Use   • Smoking status: Never Smoker   Substance Use Topics   • Alcohol use: Yes   • Drug use: Defer     Allergies   Allergen Reactions   • No Known Drug Allergy          History of Present Illness  Encounter Diagnoses   Name Primary?   • Controlled type 2 diabetes mellitus without complication, without long-term current use of insulin (CMS/[facility]) Yes   • Mixed hyperlipidemia    • Essential hypertension    • Hypogonadism in male    • Low testosterone    • Vitamin D deficiency      69-year-old male patient here today for routine follow-up visit.  He is being seen for the above-mentioned " problems.  He had recent labs which were reviewed he was provided a copy.  His medication list was reviewed and updated.  He is currently not checking his blood sugars.  He has stopped taking testosterone due to insurance formulary.  He wants to remain off testosterone at this time.  He is requesting refills for 90 days for his medications.  Has been taking vitamin D 2 capsules once weekly.  He has been advised to change that to 1 capsule twice weekly.  The following portions of the patient's history were reviewed and updated as appropriate: allergies, current medications, past family history, past medical history, past social history, past surgical history and problem list.    Review of Systems   Constitutional: Negative for fatigue.   HENT: Negative for trouble swallowing.    Eyes: Negative for visual disturbance.   Cardiovascular: Negative for leg swelling.   Endocrine: Negative for polyuria.   Skin: Negative for wound.   Neurological: Negative for numbness.       Objective   Physical Exam  Results for orders placed or performed in visit on 07/05/19   TestT+TestF+SHBG   Result Value Ref Range    Testosterone, Total 320 264 - 916 ng/dL    Testosterone, Free 5.1 (L) 6.6 - 18.1 pg/mL    Sex Hormone Binding Globulin 50.3 19.3 - 76.4 nmol/L   Hemoglobin & Hematocrit, Blood   Result Value Ref Range    Hemoglobin 14.2 13.0 - 17.7 g/dL    Hematocrit 43.4 37.5 - 51.0 %   PSA DIAGNOSTIC   Result Value Ref Range    PSA 1.080 0.000 - 4.000 ng/mL   C-Peptide   Result Value Ref Range    C-Peptide 3.0 1.1 - 4.4 ng/mL   Hemoglobin A1c   Result Value Ref Range    Hemoglobin A1C 6.10 (H) 4.80 - 5.60 %   Vitamin D 25 Hydroxy   Result Value Ref Range    25 Hydroxy, Vitamin D 40.8 30.0 - 100.0 ng/ml   Lipid Panel   Result Value Ref Range    Total Cholesterol 116 0 - 200 mg/dL    Triglycerides 94 0 - 150 mg/dL    HDL Cholesterol 52 40 - 60 mg/dL    VLDL Cholesterol 18.8 mg/dL    LDL Cholesterol  45 0 - 100 mg/dL   Comprehensive  Metabolic Panel   Result Value Ref Range    Glucose 120 (H) 65 - 99 mg/dL    BUN 20 8 - 23 mg/dL    Creatinine 0.87 0.76 - 1.27 mg/dL    eGFR Non African Am 87 >60 mL/min/1.73    eGFR African Am 105 >60 mL/min/1.73    BUN/Creatinine Ratio 23.0 7.0 - 25.0    Sodium 135 (L) 136 - 145 mmol/L    Potassium 4.4 3.5 - 5.2 mmol/L    Chloride 97 (L) 98 - 107 mmol/L    Total CO2 24.8 22.0 - 29.0 mmol/L    Calcium 9.5 8.6 - 10.5 mg/dL    Total Protein 6.5 6.0 - 8.5 g/dL    Albumin 4.60 3.50 - 5.20 g/dL    Globulin 1.9 gm/dL    A/G Ratio 2.4 g/dL    Total Bilirubin 0.7 0.2 - 1.2 mg/dL    Alkaline Phosphatase 26 (L) 39 - 117 U/L    AST (SGOT) 18 1 - 40 U/L    ALT (SGPT) 18 1 - 41 U/L   Cardiovascular Risk Assessment   Result Value Ref Range    Interpretation Note    Diabetes Patient Education   Result Value Ref Range    PDF Image Not applicable          Assessment/Plan   Problems Addressed this Visit        Cardiovascular and Mediastinum    Hyperlipidemia    Hypertension       Digestive    Vitamin D deficiency       Endocrine    Hypogonadism in male    Controlled type 2 diabetes mellitus without complication, without long-term current use of insulin (CMS/[facility]) - Primary       Other    Low testosterone          In summary, patient was seen and examined.  Clinically and metabolically patient is stable.  He will remain off testosterone therapy however we will continue to monitor.  He has no signs and symptoms of hypo-gonad is him at this time.  He states he has good energy and gets plenty of sleep.  He is taking his medications as prescribed.  His hemoglobin A1c reflects good glycemic control.  His blood pressure is in satisfactory range.  His cholesterol is controlled and vitamin D is stable.  He will follow-up in 6 months with dr johnson or myself with labs.

## 2019-08-02 ENCOUNTER — PRIOR AUTHORIZATION (OUTPATIENT)
Dept: ENDOCRINOLOGY | Age: 69
End: 2019-08-02

## 2019-10-15 ENCOUNTER — TELEPHONE (OUTPATIENT)
Dept: ENDOCRINOLOGY | Age: 69
End: 2019-10-15

## 2019-10-15 NOTE — TELEPHONE ENCOUNTER
On back order can it be changed tp another medication      Losartan  actz  100/25    Please call the pharmacy and advise

## 2019-12-13 ENCOUNTER — LAB (OUTPATIENT)
Dept: ENDOCRINOLOGY | Age: 69
End: 2019-12-13

## 2019-12-13 DIAGNOSIS — E78.2 MIXED HYPERLIPIDEMIA: ICD-10-CM

## 2019-12-13 DIAGNOSIS — E29.1 HYPOGONADISM IN MALE: ICD-10-CM

## 2019-12-13 DIAGNOSIS — E55.9 VITAMIN D DEFICIENCY: ICD-10-CM

## 2019-12-13 DIAGNOSIS — E11.9 CONTROLLED TYPE 2 DIABETES MELLITUS WITHOUT COMPLICATION, WITHOUT LONG-TERM CURRENT USE OF INSULIN (HCC): ICD-10-CM

## 2019-12-13 DIAGNOSIS — E11.9 CONTROLLED TYPE 2 DIABETES MELLITUS WITHOUT COMPLICATION, WITHOUT LONG-TERM CURRENT USE OF INSULIN (HCC): Primary | ICD-10-CM

## 2019-12-15 LAB
25(OH)D3+25(OH)D2 SERPL-MCNC: 48.2 NG/ML (ref 30–100)
ALBUMIN SERPL-MCNC: 4.6 G/DL (ref 3.5–5.2)
ALBUMIN/GLOB SERPL: 2.2 G/DL
ALP SERPL-CCNC: 22 U/L (ref 39–117)
ALT SERPL-CCNC: 19 U/L (ref 1–41)
AST SERPL-CCNC: 18 U/L (ref 1–40)
BILIRUB SERPL-MCNC: 0.3 MG/DL (ref 0.2–1.2)
BUN SERPL-MCNC: 38 MG/DL (ref 8–23)
BUN/CREAT SERPL: 27.5 (ref 7–25)
C PEPTIDE SERPL-MCNC: 4.6 NG/ML (ref 1.1–4.4)
CALCIUM SERPL-MCNC: 9.8 MG/DL (ref 8.6–10.5)
CHLORIDE SERPL-SCNC: 100 MMOL/L (ref 98–107)
CHOLEST SERPL-MCNC: 122 MG/DL (ref 0–200)
CO2 SERPL-SCNC: 27.2 MMOL/L (ref 22–29)
CREAT SERPL-MCNC: 1.38 MG/DL (ref 0.76–1.27)
GLOBULIN SER CALC-MCNC: 2.1 GM/DL
GLUCOSE SERPL-MCNC: 108 MG/DL (ref 65–99)
HBA1C MFR BLD: 6.6 % (ref 4.8–5.6)
HCT VFR BLD AUTO: 38.7 % (ref 37.5–51)
HDLC SERPL-MCNC: 52 MG/DL (ref 40–60)
HGB BLD-MCNC: 13.2 G/DL (ref 13–17.7)
INTERPRETATION: NORMAL
LDLC SERPL CALC-MCNC: 44 MG/DL (ref 0–100)
Lab: NORMAL
MICROALBUMIN UR-MCNC: 21.3 UG/ML
POTASSIUM SERPL-SCNC: 4.5 MMOL/L (ref 3.5–5.2)
PROT SERPL-MCNC: 6.7 G/DL (ref 6–8.5)
SODIUM SERPL-SCNC: 139 MMOL/L (ref 136–145)
TESTOST FREE SERPL-MCNC: 6.3 PG/ML (ref 6.6–18.1)
TESTOST SERPL-MCNC: 327 NG/DL (ref 264–916)
TRIGL SERPL-MCNC: 132 MG/DL (ref 0–150)
VLDLC SERPL CALC-MCNC: 26.4 MG/DL

## 2019-12-27 ENCOUNTER — OFFICE VISIT (OUTPATIENT)
Dept: ENDOCRINOLOGY | Age: 69
End: 2019-12-27

## 2019-12-27 VITALS
HEIGHT: 68 IN | DIASTOLIC BLOOD PRESSURE: 76 MMHG | WEIGHT: 221.4 LBS | SYSTOLIC BLOOD PRESSURE: 122 MMHG | BODY MASS INDEX: 33.56 KG/M2 | RESPIRATION RATE: 16 BRPM

## 2019-12-27 DIAGNOSIS — E78.2 MIXED HYPERLIPIDEMIA: ICD-10-CM

## 2019-12-27 DIAGNOSIS — R79.89 LOW TESTOSTERONE: ICD-10-CM

## 2019-12-27 DIAGNOSIS — E55.9 VITAMIN D DEFICIENCY: ICD-10-CM

## 2019-12-27 DIAGNOSIS — E29.1 HYPOGONADISM IN MALE: ICD-10-CM

## 2019-12-27 DIAGNOSIS — E11.9 CONTROLLED TYPE 2 DIABETES MELLITUS WITHOUT COMPLICATION, WITHOUT LONG-TERM CURRENT USE OF INSULIN (HCC): Primary | ICD-10-CM

## 2019-12-27 PROCEDURE — 99214 OFFICE O/P EST MOD 30 MIN: CPT | Performed by: NURSE PRACTITIONER

## 2019-12-27 RX ORDER — ERGOCALCIFEROL 1.25 MG/1
CAPSULE ORAL
Qty: 24 CAPSULE | Refills: 1 | Status: SHIPPED | OUTPATIENT
Start: 2019-12-27 | End: 2020-07-10 | Stop reason: SDUPTHER

## 2019-12-27 RX ORDER — ROSUVASTATIN CALCIUM 20 MG/1
20 TABLET, COATED ORAL DAILY
Qty: 90 TABLET | Refills: 1 | Status: SHIPPED | OUTPATIENT
Start: 2019-12-27 | End: 2020-07-10 | Stop reason: SDUPTHER

## 2019-12-27 RX ORDER — IRBESARTAN AND HYDROCHLOROTHIAZIDE 150; 12.5 MG/1; MG/1
2 TABLET, FILM COATED ORAL DAILY
Qty: 180 TABLET | Refills: 1 | Status: SHIPPED | OUTPATIENT
Start: 2019-12-27 | End: 2020-07-10 | Stop reason: SDUPTHER

## 2019-12-27 RX ORDER — ICOSAPENT ETHYL 1000 MG/1
CAPSULE ORAL
Qty: 360 CAPSULE | Refills: 1 | Status: SHIPPED | OUTPATIENT
Start: 2019-12-27 | End: 2021-01-15 | Stop reason: SDUPTHER

## 2019-12-27 RX ORDER — PIOGLITAZONE HCL AND METFORMIN HCL 850; 15 MG/1; MG/1
TABLET ORAL
Qty: 270 TABLET | Refills: 1 | Status: SHIPPED | OUTPATIENT
Start: 2019-12-27 | End: 2020-07-10 | Stop reason: SDUPTHER

## 2019-12-27 RX ORDER — FENOFIBRATE 145 MG/1
145 TABLET, COATED ORAL DAILY
Qty: 90 TABLET | Refills: 1 | Status: SHIPPED | OUTPATIENT
Start: 2019-12-27 | End: 2020-07-10 | Stop reason: SDUPTHER

## 2019-12-27 NOTE — PROGRESS NOTES
"Kalie Morales is a 69 y.o. male is here today for follow-up.  Chief Complaint   Patient presents with   • Diabetes     lab review; not checking BG; edarbyclor is no longer covered under his insurance; denies any problems or concerns   • Hypogonadism   • Vitamin D Deficiency   • Hyperlipidemia     /76   Resp 16   Ht 172.7 cm (68\")   Wt 100 kg (221 lb 6.4 oz)   BMI 33.66 kg/m²   Current Outpatient Medications on File Prior to Visit   Medication Sig   • aspirin 81 MG disintegrating tablet Take  by mouth.   • Azilsartan-Chlorthalidone (EDARBYCLOR) 40-25 MG tablet Take 1 tablet by mouth Daily.   • fenofibrate (TRICOR) 145 MG tablet Take 1 tablet by mouth Daily.   • JANUVIA 100 MG tablet Take 1 tablet by mouth Daily.   • pioglitazone-metFORMIN (ACTOPLUS MET)  MG per tablet 1 in am and 2 in pm   • rosuvastatin (CRESTOR) 20 MG tablet Take 1 tablet by mouth Daily.   • VASCEPA 1 g capsule capsule 2 po bid   • vitamin b complex (TOTAL B/C) tablet tablet Take  by mouth.   • vitamin D (ERGOCALCIFEROL) 00874 units capsule capsule Take 1 cap twice weekly     No current facility-administered medications on file prior to visit.      History reviewed. No pertinent family history.  Social History     Tobacco Use   • Smoking status: Never Smoker   Substance Use Topics   • Alcohol use: Yes   • Drug use: Defer     Allergies   Allergen Reactions   • No Known Drug Allergy          History of Present Illness   Encounter Diagnoses   Name Primary?   • Controlled type 2 diabetes mellitus without complication, without long-term current use of insulin (CMS/[facility]) Yes   • Mixed hyperlipidemia    • Hypogonadism in male    • Low testosterone    • Vitamin D deficiency    69-year-old male patient here today for follow-up visit.  He has been seen for the above mentioned problems.  He is taking his medications consistently as prescribed.  He is requesting changes on his blood pressure medication due to insurance formulary.  He " is requesting refills.  He is walking for exercise and has lost some weight.  He was educated today regarding the mechanism of action of Januvia and Ozempic.  We will try Ozempic to see if this controls his appetite better.  Patient states he will go to a buffet  continue to eat.  He does not feel like he reaches satiety.  He is not at risk for hypoglycemia on his current regimen.  His medication list was reviewed and updated      The following portions of the patient's history were reviewed and updated as appropriate: allergies, current medications, past family history, past medical history, past social history, past surgical history and problem list.    Review of Systems   Constitutional: Negative for fatigue.   Endocrine: Negative for cold intolerance and heat intolerance.   Psychiatric/Behavioral: Negative for sleep disturbance.       Objective   Physical Exam   Constitutional: He is oriented to person, place, and time. He appears well-developed and well-nourished. No distress.   HENT:   Head: Normocephalic and atraumatic.   Right Ear: External ear normal.   Left Ear: External ear normal.   Nose: Nose normal.   Mouth/Throat: No oropharyngeal exudate.   Eyes: Pupils are equal, round, and reactive to light. Conjunctivae and EOM are normal. Right eye exhibits no discharge. Left eye exhibits no discharge. No scleral icterus.   Neck: Normal range of motion. Neck supple. No tracheal deviation present. No thyromegaly present.   Cardiovascular: Normal rate, regular rhythm, normal heart sounds and intact distal pulses. Exam reveals no gallop and no friction rub.   No murmur heard.  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   Pulmonary/Chest: Effort normal and breath sounds normal. No stridor. No respiratory distress. He has no wheezes. He has no rales. He exhibits no tenderness.   Abdominal: Soft. Bowel sounds are normal. He exhibits no distension and no mass. There is no tenderness. There is no rebound  and no guarding. No hernia.   Musculoskeletal: Normal range of motion. He exhibits no edema, tenderness or deformity.   Lymphadenopathy:     He has no cervical adenopathy.   Neurological: He is alert and oriented to person, place, and time. Coordination normal.   Skin: Skin is warm and dry. Capillary refill takes less than 2 seconds. No rash noted. He is not diaphoretic. No erythema. No pallor.   Psychiatric: He has a normal mood and affect. His behavior is normal. Judgment and thought content normal.   Nursing note and vitals reviewed.    Results for orders placed or performed in visit on 12/13/19   MicroAlbumin, Urine, Random - Urine, Clean Catch   Result Value Ref Range    Microalbumin, Urine 21.3 Not Estab. ug/mL   Hemoglobin   Result Value Ref Range    Hemoglobin 13.2 13.0 - 17.7 g/dL   Hematocrit   Result Value Ref Range    Hematocrit 38.7 37.5 - 51.0 %   Testosterone, Free, Total   Result Value Ref Range    Testosterone, Total 327 264 - 916 ng/dL    Testosterone, Free 6.3 (L) 6.6 - 18.1 pg/mL   C-Peptide   Result Value Ref Range    C-Peptide 4.6 (H) 1.1 - 4.4 ng/mL   Hemoglobin A1c   Result Value Ref Range    Hemoglobin A1C 6.60 (H) 4.80 - 5.60 %   Vitamin D 25 Hydroxy   Result Value Ref Range    25 Hydroxy, Vitamin D 48.2 30.0 - 100.0 ng/ml   Lipid Panel   Result Value Ref Range    Total Cholesterol 122 0 - 200 mg/dL    Triglycerides 132 0 - 150 mg/dL    HDL Cholesterol 52 40 - 60 mg/dL    VLDL Cholesterol 26.4 mg/dL    LDL Cholesterol  44 0 - 100 mg/dL   Comprehensive Metabolic Panel   Result Value Ref Range    Glucose 108 (H) 65 - 99 mg/dL    BUN 38 (H) 8 - 23 mg/dL    Creatinine 1.38 (H) 0.76 - 1.27 mg/dL    eGFR Non African Am 51 (L) >60 mL/min/1.73    eGFR African Am 62 >60 mL/min/1.73    BUN/Creatinine Ratio 27.5 (H) 7.0 - 25.0    Sodium 139 136 - 145 mmol/L    Potassium 4.5 3.5 - 5.2 mmol/L    Chloride 100 98 - 107 mmol/L    Total CO2 27.2 22.0 - 29.0 mmol/L    Calcium 9.8 8.6 - 10.5 mg/dL    Total  Protein 6.7 6.0 - 8.5 g/dL    Albumin 4.60 3.50 - 5.20 g/dL    Globulin 2.1 gm/dL    A/G Ratio 2.2 g/dL    Total Bilirubin 0.3 0.2 - 1.2 mg/dL    Alkaline Phosphatase 22 (L) 39 - 117 U/L    AST (SGOT) 18 1 - 40 U/L    ALT (SGPT) 19 1 - 41 U/L   Cardiovascular Risk Assessment   Result Value Ref Range    Interpretation Note    Diabetes Patient Education   Result Value Ref Range    PDF Image Not applicable          Assessment/Plan   Problems Addressed this Visit        Cardiovascular and Mediastinum    Hyperlipidemia       Digestive    Vitamin D deficiency       Endocrine    Hypogonadism in male    Controlled type 2 diabetes mellitus without complication, without long-term current use of insulin (CMS/[facility]) - Primary       Other    Low testosterone        In Summary patient was seen and examined.  We discussed the benefit of Ozempic and helping him to lose weight.  He will stop Januvia and start Ozempic 0.25 mg weekly for 4 weeks then increase to 0.5 mg weekly.  He was shown how to give an injection at today's visit.  He was given a sample which will last for 6 weeks.  He is requesting to change from Edarbi chlor to something that is covered on his insurance formulary.  Prescription has been changed to Avalide.  He is requesting refills on his medications which were sent at today's visit.  His blood pressures in satisfactory range.  He has been walking for weight loss and has been encouraged to keep walking to lose weight.  He will follow-up in 6 months with Dr. Calvert or myself.  His labs were reviewed and reflect good glycemic control.  His testosterone level is in the low range however he is not on testosterone therapy.  His diabetes is controlled.  Vitamin D is stable cholesterol is in satisfactory range.  Creatinine is elevated and patient has been encouraged to drink more water.  Start avalide after you finish edarbychlor. Take 2 pills once daily of avalide  Stop januvia  ozempic 0.25 mg weekly for 4 weeks and then  increase to 0.5 mg weekly thereafter.

## 2019-12-27 NOTE — PATIENT INSTRUCTIONS
Start avalide after you finish edarbychlor. Take 2 pills once daily of avalide  Stop januvia  ozempic 0.25 mg weekly for 4 weeks and then increase to 0.5 mg weekly thereafter.

## 2020-01-03 ENCOUNTER — PRIOR AUTHORIZATION (OUTPATIENT)
Dept: ENDOCRINOLOGY | Age: 70
End: 2020-01-03

## 2020-06-26 ENCOUNTER — LAB (OUTPATIENT)
Dept: ENDOCRINOLOGY | Age: 70
End: 2020-06-26

## 2020-06-26 DIAGNOSIS — E55.9 VITAMIN D DEFICIENCY: ICD-10-CM

## 2020-06-26 DIAGNOSIS — E11.9 CONTROLLED TYPE 2 DIABETES MELLITUS WITHOUT COMPLICATION, WITHOUT LONG-TERM CURRENT USE OF INSULIN (HCC): ICD-10-CM

## 2020-06-26 DIAGNOSIS — I10 ESSENTIAL HYPERTENSION: ICD-10-CM

## 2020-06-26 DIAGNOSIS — E78.2 MIXED HYPERLIPIDEMIA: Primary | ICD-10-CM

## 2020-06-26 DIAGNOSIS — E29.1 HYPOGONADISM IN MALE: ICD-10-CM

## 2020-06-26 DIAGNOSIS — R79.89 LOW TESTOSTERONE: ICD-10-CM

## 2020-06-26 DIAGNOSIS — E78.2 MIXED HYPERLIPIDEMIA: ICD-10-CM

## 2020-06-28 LAB
25(OH)D3+25(OH)D2 SERPL-MCNC: 38.9 NG/ML (ref 30–100)
ALBUMIN SERPL-MCNC: 4.9 G/DL (ref 3.5–5.2)
ALBUMIN/GLOB SERPL: 2.3 G/DL
ALP SERPL-CCNC: 21 U/L (ref 39–117)
ALT SERPL-CCNC: 19 U/L (ref 1–41)
AST SERPL-CCNC: 19 U/L (ref 1–40)
BILIRUB SERPL-MCNC: 0.6 MG/DL (ref 0.2–1.2)
BUN SERPL-MCNC: 33 MG/DL (ref 8–23)
BUN/CREAT SERPL: 25.8 (ref 7–25)
C PEPTIDE SERPL-MCNC: 3.1 NG/ML (ref 1.1–4.4)
CALCIUM SERPL-MCNC: 9.8 MG/DL (ref 8.6–10.5)
CHLORIDE SERPL-SCNC: 96 MMOL/L (ref 98–107)
CHOLEST SERPL-MCNC: 111 MG/DL (ref 0–200)
CO2 SERPL-SCNC: 25.8 MMOL/L (ref 22–29)
CREAT SERPL-MCNC: 1.28 MG/DL (ref 0.76–1.27)
GLOBULIN SER CALC-MCNC: 2.1 GM/DL
GLUCOSE SERPL-MCNC: 113 MG/DL (ref 65–99)
HBA1C MFR BLD: 6.3 % (ref 4.8–5.6)
HCT VFR BLD AUTO: 36.5 % (ref 37.5–51)
HDLC SERPL-MCNC: 53 MG/DL (ref 40–60)
HGB BLD-MCNC: 12.5 G/DL (ref 13–17.7)
INTERPRETATION: NORMAL
LDLC SERPL CALC-MCNC: 40 MG/DL (ref 0–100)
Lab: NORMAL
MICROALBUMIN UR-MCNC: 5.4 UG/ML
POTASSIUM SERPL-SCNC: 4.4 MMOL/L (ref 3.5–5.2)
PROT SERPL-MCNC: 7 G/DL (ref 6–8.5)
SHBG SERPL-SCNC: 65.4 NMOL/L (ref 19.3–76.4)
SODIUM SERPL-SCNC: 133 MMOL/L (ref 136–145)
TESTOST FREE SERPL-MCNC: 6.1 PG/ML (ref 6.6–18.1)
TESTOST SERPL-MCNC: 406 NG/DL (ref 264–916)
TRIGL SERPL-MCNC: 90 MG/DL (ref 0–150)
URATE SERPL-MCNC: 3.5 MG/DL (ref 3.4–7)
VLDLC SERPL CALC-MCNC: 18 MG/DL

## 2020-07-10 ENCOUNTER — OFFICE VISIT (OUTPATIENT)
Dept: ENDOCRINOLOGY | Age: 70
End: 2020-07-10

## 2020-07-10 VITALS
DIASTOLIC BLOOD PRESSURE: 70 MMHG | SYSTOLIC BLOOD PRESSURE: 120 MMHG | HEIGHT: 68 IN | BODY MASS INDEX: 32.43 KG/M2 | WEIGHT: 214 LBS

## 2020-07-10 DIAGNOSIS — E29.1 HYPOGONADISM IN MALE: ICD-10-CM

## 2020-07-10 DIAGNOSIS — R79.89 LOW TESTOSTERONE: ICD-10-CM

## 2020-07-10 DIAGNOSIS — I10 ESSENTIAL HYPERTENSION: ICD-10-CM

## 2020-07-10 DIAGNOSIS — D64.9 ANEMIA, UNSPECIFIED TYPE: ICD-10-CM

## 2020-07-10 DIAGNOSIS — E55.9 VITAMIN D DEFICIENCY: ICD-10-CM

## 2020-07-10 DIAGNOSIS — E78.2 MIXED HYPERLIPIDEMIA: ICD-10-CM

## 2020-07-10 DIAGNOSIS — E11.9 CONTROLLED TYPE 2 DIABETES MELLITUS WITHOUT COMPLICATION, WITHOUT LONG-TERM CURRENT USE OF INSULIN (HCC): Primary | ICD-10-CM

## 2020-07-10 PROCEDURE — 99214 OFFICE O/P EST MOD 30 MIN: CPT | Performed by: NURSE PRACTITIONER

## 2020-07-10 RX ORDER — IRBESARTAN AND HYDROCHLOROTHIAZIDE 150; 12.5 MG/1; MG/1
2 TABLET, FILM COATED ORAL DAILY
Qty: 180 TABLET | Refills: 1 | Status: SHIPPED | OUTPATIENT
Start: 2020-07-10 | End: 2021-01-15 | Stop reason: SDUPTHER

## 2020-07-10 RX ORDER — ERGOCALCIFEROL 1.25 MG/1
CAPSULE ORAL
Qty: 24 CAPSULE | Refills: 1 | Status: SHIPPED | OUTPATIENT
Start: 2020-07-10 | End: 2021-01-15 | Stop reason: SDUPTHER

## 2020-07-10 RX ORDER — FENOFIBRATE 145 MG/1
145 TABLET, COATED ORAL DAILY
Qty: 90 TABLET | Refills: 1 | Status: SHIPPED | OUTPATIENT
Start: 2020-07-10 | End: 2021-01-15 | Stop reason: SDUPTHER

## 2020-07-10 RX ORDER — ROSUVASTATIN CALCIUM 20 MG/1
20 TABLET, COATED ORAL DAILY
Qty: 90 TABLET | Refills: 1 | Status: SHIPPED | OUTPATIENT
Start: 2020-07-10 | End: 2021-01-15 | Stop reason: SDUPTHER

## 2020-07-10 RX ORDER — PIOGLITAZONE HCL AND METFORMIN HCL 850; 15 MG/1; MG/1
TABLET ORAL
Qty: 270 TABLET | Refills: 1 | Status: SHIPPED | OUTPATIENT
Start: 2020-07-10 | End: 2021-01-15 | Stop reason: SDUPTHER

## 2020-07-10 NOTE — PROGRESS NOTES
"Kalie Morales is a 70 y.o. male is here today for follow-up.  Chief Complaint   Patient presents with   • Diabetes     recent labs, bg x 0, eye exam: pt brought. no meter or pump   • Hyperlipidemia   • Vitamin D Deficiency   • Hypogonadism     /70   Ht 172.7 cm (67.99\")   Wt 97.1 kg (214 lb)   BMI 32.55 kg/m²   Current Outpatient Medications on File Prior to Visit   Medication Sig   • aspirin 81 MG disintegrating tablet Take  by mouth.   • VASCEPA 1 g capsule capsule 2 po bid   • vitamin b complex (TOTAL B/C) tablet tablet Take  by mouth.   • [DISCONTINUED] fenofibrate (TRICOR) 145 MG tablet Take 1 tablet by mouth Daily.   • [DISCONTINUED] irbesartan-hydrochlorothiazide (AVALIDE) 150-12.5 MG tablet Take 2 tablets by mouth Daily.   • [DISCONTINUED] pioglitazone-metFORMIN (ACTOPLUS MET)  MG per tablet 1 in am and 2 in pm   • [DISCONTINUED] rosuvastatin (CRESTOR) 20 MG tablet Take 1 tablet by mouth Daily.   • [DISCONTINUED] Semaglutide,0.25 or 0.5MG/DOS, (OZEMPIC, 0.25 OR 0.5 MG/DOSE,) 2 MG/1.5ML solution pen-injector Inject 0.5 mg under the skin into the appropriate area as directed 1 (One) Time Per Week.   • [DISCONTINUED] vitamin D (ERGOCALCIFEROL) 1.25 MG (53987 UT) capsule capsule Take 1 cap twice weekly     No current facility-administered medications on file prior to visit.      History reviewed. No pertinent family history.  Social History     Tobacco Use   • Smoking status: Never Smoker   Substance Use Topics   • Alcohol use: Yes   • Drug use: Defer     Allergies   Allergen Reactions   • No Known Drug Allergy          History of Present Illness   Encounter Diagnoses   Name Primary?   • Controlled type 2 diabetes mellitus without complication, without long-term current use of insulin (CMS/[facility]) Yes   • Mixed hyperlipidemia    • Essential hypertension    • Hypogonadism in male    • Low testosterone    • Vitamin D deficiency    • Anemia, unspecified type      70-year-old male patient here " today for a follow-up visit.  He has been seen for the above-mentioned problems.  He was surprised with his weight loss and states he is feeling really good.  He was noticed to be slightly anemic according to his hemoglobin hematocrit.  He does appear to be overdue for colonoscopy however he states he had it done at [place].  There is no record from [place] of him having a colonoscopy.  He has been advised to follow-up with his primary care provider we will continue to monitor his hemoglobin hematocrit.  He is not on testosterone therapy and states he did not notice any benefit of being on testosterone therapy.  His A1c is in satisfactory range.  His labs were reviewed and he was provided a copy.  He is only been taking Ozempic 0.25 mg weekly.  He was under the impression he was forced to continue at the 0.25 instead of increasing to 0.5.  His highest weight has been 236 pounds.  He is currently at 214 pounds.  He had an eye exam in June in which she brought a copy.  The copy has been submitted to be scanned in his medical record    The following portions of the patient's history were reviewed and updated as appropriate: allergies, current medications, past family history, past medical history, past social history, past surgical history and problem list.    Review of Systems   Constitutional: Negative for appetite change and fatigue.   Eyes: Negative for visual disturbance.   Respiratory: Negative for cough.    Cardiovascular: Negative for leg swelling.   Gastrointestinal: Negative for constipation and diarrhea.   Endocrine: Negative for cold intolerance, heat intolerance, polydipsia, polyphagia and polyuria.   Genitourinary: Negative for frequency.   Neurological: Negative for numbness.       Objective   Physical Exam   Constitutional: He is oriented to person, place, and time. He appears well-developed and well-nourished. No distress.   HENT:   Head: Normocephalic and atraumatic.   Right Ear: External ear normal.    Left Ear: External ear normal.   Nose: Nose normal.   Mouth/Throat: No oropharyngeal exudate.   Eyes: Pupils are equal, round, and reactive to light. Conjunctivae and EOM are normal. Right eye exhibits no discharge. Left eye exhibits no discharge. No scleral icterus.   Neck: Normal range of motion. Neck supple. No tracheal deviation present. No thyromegaly present.   Cardiovascular: Normal rate, regular rhythm, normal heart sounds and intact distal pulses. Exam reveals no gallop and no friction rub.   No murmur heard.  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   Pulmonary/Chest: Effort normal and breath sounds normal. No stridor. No respiratory distress. He has no wheezes. He has no rales. He exhibits no tenderness.   Abdominal: Soft. Bowel sounds are normal. He exhibits no distension and no mass. There is no tenderness. There is no rebound and no guarding. No hernia.   Musculoskeletal: Normal range of motion. He exhibits no edema, tenderness or deformity.   Lymphadenopathy:     He has no cervical adenopathy.   Neurological: He is alert and oriented to person, place, and time. Coordination normal.   Skin: Skin is warm and dry. Capillary refill takes less than 2 seconds. No rash noted. He is not diaphoretic. No erythema. No pallor.   Psychiatric: He has a normal mood and affect. His behavior is normal. Judgment and thought content normal.   Nursing note and vitals reviewed.    Results for orders placed or performed in visit on 06/26/20   Hemoglobin & Hematocrit, Blood   Result Value Ref Range    Hemoglobin 12.5 (L) 13.0 - 17.7 g/dL    Hematocrit 36.5 (L) 37.5 - 51.0 %   TestT+TestF+SHBG   Result Value Ref Range    Testosterone, Total 406 264 - 916 ng/dL    Testosterone, Free 6.1 (L) 6.6 - 18.1 pg/mL    Sex Hormone Binding Globulin 65.4 19.3 - 76.4 nmol/L   Vitamin D 25 Hydroxy   Result Value Ref Range    25 Hydroxy, Vitamin D 38.9 30.0 - 100.0 ng/ml   Uric Acid   Result Value Ref Range    Uric Acid  3.5 3.4 - 7.0 mg/dL   MicroAlbumin, Urine, Random - Urine, Clean Catch   Result Value Ref Range    Microalbumin, Urine 5.4 Not Estab. ug/mL   Lipid Panel   Result Value Ref Range    Total Cholesterol 111 0 - 200 mg/dL    Triglycerides 90 0 - 150 mg/dL    HDL Cholesterol 53 40 - 60 mg/dL    VLDL Cholesterol 18 mg/dL    LDL Cholesterol  40 0 - 100 mg/dL   Hemoglobin A1c   Result Value Ref Range    Hemoglobin A1C 6.30 (H) 4.80 - 5.60 %   C-Peptide   Result Value Ref Range    C-Peptide 3.1 1.1 - 4.4 ng/mL   Comprehensive Metabolic Panel   Result Value Ref Range    Glucose 113 (H) 65 - 99 mg/dL    BUN 33 (H) 8 - 23 mg/dL    Creatinine 1.28 (H) 0.76 - 1.27 mg/dL    eGFR Non African Am 56 (L) >60 mL/min/1.73    eGFR African Am 67 >60 mL/min/1.73    BUN/Creatinine Ratio 25.8 (H) 7.0 - 25.0    Sodium 133 (L) 136 - 145 mmol/L    Potassium 4.4 3.5 - 5.2 mmol/L    Chloride 96 (L) 98 - 107 mmol/L    Total CO2 25.8 22.0 - 29.0 mmol/L    Calcium 9.8 8.6 - 10.5 mg/dL    Total Protein 7.0 6.0 - 8.5 g/dL    Albumin 4.90 3.50 - 5.20 g/dL    Globulin 2.1 gm/dL    A/G Ratio 2.3 g/dL    Total Bilirubin 0.6 0.2 - 1.2 mg/dL    Alkaline Phosphatase 21 (L) 39 - 117 U/L    AST (SGOT) 19 1 - 40 U/L    ALT (SGPT) 19 1 - 41 U/L   Cardiovascular Risk Assessment   Result Value Ref Range    Interpretation Note    Diabetes Patient Education   Result Value Ref Range    PDF Image Not applicable          Assessment/Plan   Problems Addressed this Visit        Cardiovascular and Mediastinum    Hyperlipidemia    Relevant Medications    fenofibrate (Tricor) 145 MG tablet    rosuvastatin (Crestor) 20 MG tablet    Hypertension    Relevant Medications    irbesartan-hydrochlorothiazide (Avalide) 150-12.5 MG tablet       Digestive    Vitamin D deficiency       Endocrine    Hypogonadism in male    Controlled type 2 diabetes mellitus without complication, without long-term current use of insulin (CMS/[facility]) - Primary    Relevant Medications    Semaglutide,0.25 or  0.5MG/DOS, (Ozempic, 0.25 or 0.5 MG/DOSE,) 2 MG/1.5ML solution pen-injector    pioglitazone-metFORMIN (ACTOPLUS MET)  MG per tablet       Hematopoietic and Hemostatic    Anemia       Other    Low testosterone          In summary patient was seen and examined.  Metabolically and clinically he presents stable.  He will increase Ozempic to 0.5 mg weekly.  His blood pressure is in satisfactory range.  No medications have been added or changed.  His refills have been sent to his pharmacy.  He has been successful with some weight loss according to the scales.  He will follow-up in our office in 6 months with labs prior.  He is up-to-date on his eye exam.  He was noted to be slightly anemic based on his hemoglobin hematocrit.  He claims to be up-to-date on colonoscopy however there is no record in his medical record.  He has been advised to follow-up with his primary care provider and we will also continue to monitor his hemoglobin and hematocrit.

## 2020-12-17 ENCOUNTER — RESULTS ENCOUNTER (OUTPATIENT)
Dept: ENDOCRINOLOGY | Age: 70
End: 2020-12-17

## 2020-12-17 DIAGNOSIS — E11.9 CONTROLLED TYPE 2 DIABETES MELLITUS WITHOUT COMPLICATION, WITHOUT LONG-TERM CURRENT USE OF INSULIN (HCC): ICD-10-CM

## 2020-12-17 DIAGNOSIS — E55.9 VITAMIN D DEFICIENCY: ICD-10-CM

## 2020-12-17 DIAGNOSIS — E29.1 HYPOGONADISM IN MALE: ICD-10-CM

## 2020-12-17 DIAGNOSIS — R79.89 LOW TESTOSTERONE: ICD-10-CM

## 2020-12-17 DIAGNOSIS — E78.2 MIXED HYPERLIPIDEMIA: Primary | ICD-10-CM

## 2020-12-17 DIAGNOSIS — I10 ESSENTIAL HYPERTENSION: ICD-10-CM

## 2020-12-17 DIAGNOSIS — E78.2 MIXED HYPERLIPIDEMIA: ICD-10-CM

## 2020-12-18 ENCOUNTER — LAB (OUTPATIENT)
Dept: ENDOCRINOLOGY | Age: 70
End: 2020-12-18

## 2020-12-18 DIAGNOSIS — E29.1 HYPOGONADISM IN MALE: ICD-10-CM

## 2020-12-18 DIAGNOSIS — R79.89 LOW TESTOSTERONE: ICD-10-CM

## 2020-12-18 DIAGNOSIS — E78.2 MIXED HYPERLIPIDEMIA: ICD-10-CM

## 2020-12-18 DIAGNOSIS — E55.9 VITAMIN D DEFICIENCY: ICD-10-CM

## 2020-12-18 DIAGNOSIS — I10 ESSENTIAL HYPERTENSION: ICD-10-CM

## 2020-12-18 DIAGNOSIS — E11.9 CONTROLLED TYPE 2 DIABETES MELLITUS WITHOUT COMPLICATION, WITHOUT LONG-TERM CURRENT USE OF INSULIN (HCC): ICD-10-CM

## 2020-12-20 LAB
25(OH)D3+25(OH)D2 SERPL-MCNC: 42 NG/ML (ref 30–100)
C PEPTIDE SERPL-MCNC: 2.3 NG/ML (ref 1.1–4.4)
CHOLEST SERPL-MCNC: 111 MG/DL (ref 0–200)
HBA1C MFR BLD: 5.7 % (ref 4.8–5.6)
HCT VFR BLD AUTO: 37.6 % (ref 37.5–51)
HDLC SERPL-MCNC: 59 MG/DL (ref 40–60)
HGB BLD-MCNC: 12.7 G/DL (ref 13–17.7)
INTERPRETATION: NORMAL
LDLC SERPL CALC-MCNC: 37 MG/DL (ref 0–100)
Lab: NORMAL
TESTOST FREE SERPL-MCNC: 6.1 PG/ML (ref 6.6–18.1)
TESTOST SERPL-MCNC: 409 NG/DL (ref 264–916)
TRIGL SERPL-MCNC: 76 MG/DL (ref 0–150)
VLDLC SERPL CALC-MCNC: 15 MG/DL (ref 5–40)

## 2021-01-15 ENCOUNTER — OFFICE VISIT (OUTPATIENT)
Dept: ENDOCRINOLOGY | Age: 71
End: 2021-01-15

## 2021-01-15 VITALS
DIASTOLIC BLOOD PRESSURE: 84 MMHG | WEIGHT: 208.4 LBS | BODY MASS INDEX: 31.58 KG/M2 | SYSTOLIC BLOOD PRESSURE: 134 MMHG | HEIGHT: 68 IN

## 2021-01-15 DIAGNOSIS — E55.9 VITAMIN D DEFICIENCY: ICD-10-CM

## 2021-01-15 DIAGNOSIS — E11.9 CONTROLLED TYPE 2 DIABETES MELLITUS WITHOUT COMPLICATION, WITHOUT LONG-TERM CURRENT USE OF INSULIN (HCC): Primary | ICD-10-CM

## 2021-01-15 DIAGNOSIS — I10 ESSENTIAL HYPERTENSION: ICD-10-CM

## 2021-01-15 DIAGNOSIS — E29.1 HYPOGONADISM IN MALE: ICD-10-CM

## 2021-01-15 DIAGNOSIS — E78.2 MIXED HYPERLIPIDEMIA: ICD-10-CM

## 2021-01-15 PROCEDURE — 99213 OFFICE O/P EST LOW 20 MIN: CPT | Performed by: NURSE PRACTITIONER

## 2021-01-15 RX ORDER — SEMAGLUTIDE 1.34 MG/ML
0.5 INJECTION, SOLUTION SUBCUTANEOUS WEEKLY
Qty: 9 ML | Refills: 1 | Status: SHIPPED | OUTPATIENT
Start: 2021-01-15 | End: 2021-11-05 | Stop reason: CLARIF

## 2021-01-15 RX ORDER — FENOFIBRATE 145 MG/1
145 TABLET, COATED ORAL DAILY
Qty: 90 TABLET | Refills: 1 | Status: SHIPPED | OUTPATIENT
Start: 2021-01-15 | End: 2021-07-28 | Stop reason: SDUPTHER

## 2021-01-15 RX ORDER — ICOSAPENT ETHYL 1000 MG/1
CAPSULE ORAL
Qty: 360 CAPSULE | Refills: 1 | Status: SHIPPED | OUTPATIENT
Start: 2021-01-15 | End: 2021-07-07

## 2021-01-15 RX ORDER — ROSUVASTATIN CALCIUM 20 MG/1
20 TABLET, COATED ORAL DAILY
Qty: 90 TABLET | Refills: 1 | Status: SHIPPED | OUTPATIENT
Start: 2021-01-15 | End: 2021-07-07

## 2021-01-15 RX ORDER — PIOGLITAZONE HCL AND METFORMIN HCL 850; 15 MG/1; MG/1
TABLET ORAL
Qty: 270 TABLET | Refills: 1 | Status: SHIPPED | OUTPATIENT
Start: 2021-01-15 | End: 2022-05-20 | Stop reason: DRUGHIGH

## 2021-01-15 RX ORDER — IRBESARTAN AND HYDROCHLOROTHIAZIDE 150; 12.5 MG/1; MG/1
2 TABLET, FILM COATED ORAL DAILY
Qty: 180 TABLET | Refills: 1 | Status: SHIPPED | OUTPATIENT
Start: 2021-01-15 | End: 2021-09-16 | Stop reason: SDUPTHER

## 2021-01-15 RX ORDER — ERGOCALCIFEROL 1.25 MG/1
CAPSULE ORAL
Qty: 26 CAPSULE | Refills: 1 | Status: SHIPPED | OUTPATIENT
Start: 2021-01-15 | End: 2021-07-07

## 2021-06-15 ENCOUNTER — TELEPHONE (OUTPATIENT)
Dept: ENDOCRINOLOGY | Age: 71
End: 2021-06-15

## 2021-06-23 NOTE — PROGRESS NOTES
"Kalie Morales is a 70 y.o. male. he is here today for follow-up.  Chief Complaint   Patient presents with   • Diabetes     type 2 diabetes, recent labs, does not check blood sugar, eye exam 2020   • Hypogonadism     /84   Ht 172.7 cm (68\")   Wt 94.5 kg (208 lb 6.4 oz)   BMI 31.69 kg/m²       History of Present Illness   Encounter Diagnoses   Name Primary?   • Controlled type 2 diabetes mellitus without complication, without long-term current use of insulin (CMS/HCC) Yes   • Hypogonadism in male    • Mixed hyperlipidemia    • Essential hypertension    • Vitamin D deficiency        70-year-old male patient here today for routine follow-up visit.  He has been seen for the above-mentioned problems.  He had recent labs which were reviewed.  His medication list was reviewed and updated for accuracy.  He is not currently on testosterone therapy.  He is tolerating Ozempic well.  He is up-to-date on his eye exam.  He denies neuropathy.  He is currently not been checking his blood sugars.  His most recent A1c reflects good glycemic control.  His labs were reviewed he was provided a copy.  He has had some weight loss.  The following portions of the patient's history were reviewed and updated as appropriate:   Past Medical History:   Diagnosis Date   • Hyperlipidemia    • Hypogonadism male    • Testosterone deficiency    • Type 2 diabetes mellitus (CMS/HCC)    • Vitamin D deficiency      No past surgical history on file.  No family history on file.    Current Outpatient Medications   Medication Sig Dispense Refill   • aspirin 81 MG disintegrating tablet Take  by mouth.     • fenofibrate (Tricor) 145 MG tablet Take 1 tablet by mouth Daily. 90 tablet 1   • irbesartan-hydrochlorothiazide (Avalide) 150-12.5 MG tablet Take 2 tablets by mouth Daily. 180 tablet 1   • pioglitazone-metFORMIN (ACTOPLUS MET)  MG per tablet 1 in am and 2 in pm 270 tablet 1   • rosuvastatin (Crestor) 20 MG tablet Take 1 tablet " "by mouth Daily. 90 tablet 1   • Semaglutide,0.25 or 0.5MG/DOS, (Ozempic, 0.25 or 0.5 MG/DOSE,) 2 MG/1.5ML solution pen-injector Inject 0.5 mg under the skin into the appropriate area as directed 1 (One) Time Per Week. 1 pen 5   • VASCEPA 1 g capsule capsule 2 po bid 360 capsule 1   • vitamin b complex (TOTAL B/C) tablet tablet Take  by mouth.     • vitamin D (ERGOCALCIFEROL) 1.25 MG (22398 UT) capsule capsule Take 1 cap twice weekly 24 capsule 1     No current facility-administered medications for this visit.      Allergies   Allergen Reactions   • No Known Drug Allergy      Social History     Socioeconomic History   • Marital status: Unknown     Spouse name: Not on file   • Number of children: Not on file   • Years of education: Not on file   • Highest education level: Not on file   Tobacco Use   • Smoking status: Never Smoker   Substance and Sexual Activity   • Alcohol use: Yes   • Drug use: Defer   • Sexual activity: Defer       Review of Systems  Review of Systems   Constitutional: Negative for appetite change and fatigue.   Eyes: Negative for visual disturbance.   Respiratory: Negative for cough.    Cardiovascular: Negative for leg swelling.   Gastrointestinal: Negative for constipation and diarrhea.   Endocrine: Negative for cold intolerance, heat intolerance, polydipsia, polyphagia and polyuria.   Genitourinary: Negative for frequency.   Neurological: Negative for numbness.        Objective    /84   Ht 172.7 cm (68\")   Wt 94.5 kg (208 lb 6.4 oz)   BMI 31.69 kg/m²     Physical Exam  Vitals signs and nursing note reviewed.   Constitutional:       General: He is not in acute distress.     Appearance: Normal appearance. He is well-developed. He is obese. He is not diaphoretic.   HENT:      Head: Normocephalic and atraumatic.      Right Ear: External ear normal.      Left Ear: External ear normal.      Nose: Nose normal.   Eyes:      General:         Right eye: No discharge.         Left eye: No " no fever and no chills. discharge.      Pupils: Pupils are equal, round, and reactive to light.   Neck:      Musculoskeletal: Normal range of motion and neck supple. No edema or erythema.      Thyroid: No thyromegaly.      Vascular: No carotid bruit.      Trachea: No tracheal deviation.   Cardiovascular:      Rate and Rhythm: Normal rate and regular rhythm.      Heart sounds: Normal heart sounds. No murmur. No friction rub. No gallop.    Pulmonary:      Effort: Pulmonary effort is normal. No respiratory distress.      Breath sounds: Normal breath sounds. No wheezing or rales.   Abdominal:      General: Bowel sounds are normal. There is no distension.      Palpations: Abdomen is soft.      Tenderness: There is no abdominal tenderness.   Musculoskeletal: Normal range of motion.         General: No deformity.   Lymphadenopathy:      Cervical: No cervical adenopathy.   Skin:     General: Skin is warm and dry.      Coloration: Skin is not pale.      Findings: No erythema or rash.   Neurological:      Mental Status: He is alert and oriented to person, place, and time.      Coordination: Coordination normal.   Psychiatric:         Behavior: Behavior normal.         Thought Content: Thought content normal.         Judgment: Judgment normal.         Lab Review  Sodium (mmol/L)   Date Value   06/26/2020 133 (L)   12/13/2019 139   07/05/2019 135 (L)     Potassium (mmol/L)   Date Value   06/26/2020 4.4   12/13/2019 4.5   07/05/2019 4.4     Chloride (mmol/L)   Date Value   06/26/2020 96 (L)   12/13/2019 100   07/05/2019 97 (L)     Total CO2 (mmol/L)   Date Value   06/26/2020 25.8   12/13/2019 27.2   07/05/2019 24.8     BUN (mg/dL)   Date Value   06/26/2020 33 (H)   12/13/2019 38 (H)   07/05/2019 20     Creatinine (mg/dL)   Date Value   06/26/2020 1.28 (H)   12/13/2019 1.38 (H)   07/05/2019 0.87     Hemoglobin A1C (%)   Date Value   12/18/2020 5.70 (H)   06/26/2020 6.30 (H)   12/13/2019 6.60 (H)     Triglycerides (mg/dL)   Date Value   12/18/2020 76    06/26/2020 90   12/13/2019 132     LDL Cholesterol  (mg/dL)   Date Value   06/26/2020 40   12/13/2019 44   07/05/2019 45     LDL Chol Calc (NIH) (mg/dL)   Date Value   12/18/2020 37       Assessment/Plan      1. Controlled type 2 diabetes mellitus without complication, without long-term current use of insulin (CMS/[facility])    2. Hypogonadism in male    3. Mixed hyperlipidemia    4. Essential hypertension    5. Vitamin D deficiency    .    Medications prescribed:  Outpatient Encounter Medications as of 1/15/2021   Medication Sig Dispense Refill   • aspirin 81 MG disintegrating tablet Take  by mouth.     • fenofibrate (Tricor) 145 MG tablet Take 1 tablet by mouth Daily. 90 tablet 1   • irbesartan-hydrochlorothiazide (Avalide) 150-12.5 MG tablet Take 2 tablets by mouth Daily. 180 tablet 1   • pioglitazone-metFORMIN (ACTOPLUS MET)  MG per tablet 1 in am and 2 in pm 270 tablet 1   • rosuvastatin (Crestor) 20 MG tablet Take 1 tablet by mouth Daily. 90 tablet 1   • Semaglutide,0.25 or 0.5MG/DOS, (Ozempic, 0.25 or 0.5 MG/DOSE,) 2 MG/1.5ML solution pen-injector Inject 0.5 mg under the skin into the appropriate area as directed 1 (One) Time Per Week. 1 pen 5   • VASCEPA 1 g capsule capsule 2 po bid 360 capsule 1   • vitamin b complex (TOTAL B/C) tablet tablet Take  by mouth.     • vitamin D (ERGOCALCIFEROL) 1.25 MG (89610 UT) capsule capsule Take 1 cap twice weekly 24 capsule 1     No facility-administered encounter medications on file as of 1/15/2021.        Orders placed during this encounter include:  No orders of the defined types were placed in this encounter.    In summary patient was seen and examined.  Metabolically and clinically he presents stable.  He will continue all his current medications as prescribed.  His blood pressure is in satisfactory range.  He will follow-up in 6 months.  A1c reflects good glycemic control.  Cholesterol and vitamin D level are stable.  No medications have been added or changed.   His refills have been sent to his pharmacy per his request

## 2021-07-07 RX ORDER — IRBESARTAN AND HYDROCHLOROTHIAZIDE 150; 12.5 MG/1; MG/1
TABLET, FILM COATED ORAL
Qty: 180 TABLET | Refills: 0 | OUTPATIENT
Start: 2021-07-07

## 2021-07-07 RX ORDER — PIOGLITAZONE HCL AND METFORMIN HCL 500; 15 MG/1; MG/1
TABLET ORAL
Qty: 270 TABLET | Refills: 0 | Status: SHIPPED | OUTPATIENT
Start: 2021-07-07 | End: 2021-10-04

## 2021-07-07 RX ORDER — ICOSAPENT ETHYL 1000 MG/1
CAPSULE ORAL
Qty: 360 CAPSULE | Refills: 1 | Status: SHIPPED | OUTPATIENT
Start: 2021-07-07 | End: 2022-01-18

## 2021-07-07 RX ORDER — ERGOCALCIFEROL 1.25 MG/1
CAPSULE ORAL
Qty: 24 CAPSULE | Refills: 1 | Status: SHIPPED | OUTPATIENT
Start: 2021-07-07 | End: 2021-11-05 | Stop reason: SDUPTHER

## 2021-07-07 RX ORDER — ROSUVASTATIN CALCIUM 20 MG/1
TABLET, COATED ORAL
Qty: 90 TABLET | Refills: 0 | Status: SHIPPED | OUTPATIENT
Start: 2021-07-07 | End: 2021-07-28 | Stop reason: SDUPTHER

## 2021-07-07 RX ORDER — SEMAGLUTIDE 1.34 MG/ML
INJECTION, SOLUTION SUBCUTANEOUS
Qty: 9 ML | Refills: 0 | Status: SHIPPED | OUTPATIENT
Start: 2021-07-07 | End: 2021-10-04

## 2021-07-07 RX ORDER — FENOFIBRATE 145 MG/1
TABLET, COATED ORAL
Qty: 90 TABLET | Refills: 1 | OUTPATIENT
Start: 2021-07-07

## 2021-07-28 RX ORDER — FENOFIBRATE 145 MG/1
145 TABLET, COATED ORAL DAILY
Qty: 90 TABLET | Refills: 1 | Status: SHIPPED | OUTPATIENT
Start: 2021-07-28 | End: 2021-08-04 | Stop reason: SDUPTHER

## 2021-07-28 RX ORDER — ROSUVASTATIN CALCIUM 20 MG/1
20 TABLET, COATED ORAL DAILY
Qty: 90 TABLET | Refills: 0 | Status: SHIPPED | OUTPATIENT
Start: 2021-07-28 | End: 2021-08-04 | Stop reason: SDUPTHER

## 2021-08-04 RX ORDER — FENOFIBRATE 145 MG/1
145 TABLET, COATED ORAL DAILY
Qty: 90 TABLET | Refills: 1 | Status: SHIPPED | OUTPATIENT
Start: 2021-08-04 | End: 2022-01-18

## 2021-08-04 RX ORDER — ROSUVASTATIN CALCIUM 20 MG/1
20 TABLET, COATED ORAL DAILY
Qty: 90 TABLET | Refills: 0 | Status: SHIPPED | OUTPATIENT
Start: 2021-08-04 | End: 2021-11-05 | Stop reason: SDUPTHER

## 2021-08-04 NOTE — TELEPHONE ENCOUNTER
Script needed for rosuvastain 20 mg    Fenofibrate 145 mg      [facility]  90 day supply

## 2021-09-10 RX ORDER — IRBESARTAN AND HYDROCHLOROTHIAZIDE 150; 12.5 MG/1; MG/1
TABLET, FILM COATED ORAL
Qty: 180 TABLET | Refills: 0 | OUTPATIENT
Start: 2021-09-10

## 2021-09-16 ENCOUNTER — TELEPHONE (OUTPATIENT)
Dept: ENDOCRINOLOGY | Age: 71
End: 2021-09-16

## 2021-09-16 DIAGNOSIS — I10 ESSENTIAL HYPERTENSION: Primary | ICD-10-CM

## 2021-09-16 RX ORDER — IRBESARTAN AND HYDROCHLOROTHIAZIDE 150; 12.5 MG/1; MG/1
2 TABLET, FILM COATED ORAL DAILY
Qty: 180 TABLET | Refills: 1 | Status: SHIPPED | OUTPATIENT
Start: 2021-09-16 | End: 2022-02-15

## 2021-09-16 NOTE — TELEPHONE ENCOUNTER
[facility] called.     The patient needs the Avalide refilled. He is completely out of the medication. Can we send this to his pharmacy?

## 2021-10-04 RX ORDER — SEMAGLUTIDE 1.34 MG/ML
INJECTION, SOLUTION SUBCUTANEOUS
Qty: 9 ML | Refills: 0 | Status: SHIPPED | OUTPATIENT
Start: 2021-10-04 | End: 2021-11-05 | Stop reason: CLARIF

## 2021-10-04 RX ORDER — PIOGLITAZONE HCL AND METFORMIN HCL 500; 15 MG/1; MG/1
TABLET ORAL
Qty: 270 TABLET | Refills: 0 | Status: SHIPPED | OUTPATIENT
Start: 2021-10-04 | End: 2021-11-05 | Stop reason: SDUPTHER

## 2021-11-05 ENCOUNTER — OFFICE VISIT (OUTPATIENT)
Dept: ENDOCRINOLOGY | Age: 71
End: 2021-11-05

## 2021-11-05 VITALS
HEIGHT: 68 IN | WEIGHT: 214 LBS | RESPIRATION RATE: 20 BRPM | DIASTOLIC BLOOD PRESSURE: 64 MMHG | SYSTOLIC BLOOD PRESSURE: 128 MMHG | OXYGEN SATURATION: 98 % | HEART RATE: 70 BPM | BODY MASS INDEX: 32.43 KG/M2

## 2021-11-05 DIAGNOSIS — E11.9 CONTROLLED TYPE 2 DIABETES MELLITUS WITHOUT COMPLICATION, WITHOUT LONG-TERM CURRENT USE OF INSULIN (HCC): Primary | ICD-10-CM

## 2021-11-05 DIAGNOSIS — E55.9 VITAMIN D DEFICIENCY: ICD-10-CM

## 2021-11-05 DIAGNOSIS — E78.2 MIXED HYPERLIPIDEMIA: ICD-10-CM

## 2021-11-05 DIAGNOSIS — I10 PRIMARY HYPERTENSION: ICD-10-CM

## 2021-11-05 PROCEDURE — 99214 OFFICE O/P EST MOD 30 MIN: CPT | Performed by: NURSE PRACTITIONER

## 2021-11-05 RX ORDER — SEMAGLUTIDE 1.34 MG/ML
1 INJECTION, SOLUTION SUBCUTANEOUS
Qty: 9 ML | Refills: 1 | Status: SHIPPED | OUTPATIENT
Start: 2021-11-05 | End: 2022-05-20 | Stop reason: SDUPTHER

## 2021-11-05 RX ORDER — ROSUVASTATIN CALCIUM 20 MG/1
20 TABLET, COATED ORAL DAILY
Qty: 90 TABLET | Refills: 0 | Status: SHIPPED | OUTPATIENT
Start: 2021-11-05 | End: 2022-02-17

## 2021-11-05 RX ORDER — ERGOCALCIFEROL 1.25 MG/1
50000 CAPSULE ORAL 2 TIMES WEEKLY
Qty: 24 CAPSULE | Refills: 1 | Status: SHIPPED | OUTPATIENT
Start: 2021-11-08 | End: 2022-05-20 | Stop reason: SDUPTHER

## 2021-11-05 NOTE — PROGRESS NOTES
"Chief Complaint  Chief Complaint   Patient presents with   • Diabetes     fup dm2  med eval last eye exam 06/21         Subjective          History of Present Illness    Reji Morales 71 y.o. presents for a follow-up evaluation for type 2 DM    Pt is on the following medications for their DM: pioglitazone-Metformin  mg one in am and two in pm And Ozempic 1 mg once a week      Denies diarrhea or constipation, difficulty breathing,chest pain, palpitations, vision changes, numbness and tingling in feet/hands.      Pt does not have a history of DM retinopathy.  Last eye exam was 06/21    Pt does have a history of nephropathy.  Patient is currently taking ARB    Pt does not have neuropathy.      Pt does not have a history of CAD or CVA.    Last A1C in 12/20 was 5.70    Last microalbumin in 06/20 was negative      Blood Sugars    Blood glucoses are not checked.            Hyperlipidemia     Pt denies any muscle/body aches, chest pain, or shortness of breath    Pt is currently taking fenofibrate 145 mg daily and Crestor 20 mg HS    Last lipid panel in 10/20 showed Total 111, Triglycerides 76, HDL 59 and LDL 37        Hypertension    Pt denies any chest pain, palpitations, shortness of breath, or headache    Current regimen includes irbesartan-HTCZ 150-12.5 mg 2 tablets daily        Vitamin D Deficiency    Current treatment includes 50,000 units twice a week    Last Vit D level was 42 in 12/20           I have reviewed the patient's allergies, medicines, past medical hx, family hx and social hx.    Objective   Vital Signs:   /64 (BP Location: Left arm, Patient Position: Sitting, Cuff Size: Adult)   Pulse 70   Resp 20   Ht 172.7 cm (68\")   Wt 97.1 kg (214 lb)   SpO2 98%   BMI 32.54 kg/m²       Physical Exam   Physical Exam  Constitutional:       General: He is not in acute distress.     Appearance: Normal appearance. He is not diaphoretic.   HENT:      Head: Normocephalic and atraumatic.   Eyes:      General: "         Right eye: No discharge.         Left eye: No discharge.   Cardiovascular:      Rate and Rhythm: Normal rate and regular rhythm.      Heart sounds: Normal heart sounds. No murmur heard.  No friction rub. No gallop.    Pulmonary:      Effort: Pulmonary effort is normal. No respiratory distress.      Breath sounds: Normal breath sounds. No wheezing.   Skin:     General: Skin is warm and dry.   Neurological:      Mental Status: He is alert.   Psychiatric:         Mood and Affect: Mood normal.         Behavior: Behavior normal.                    Results Review:   Hemoglobin A1C   Date Value Ref Range Status   12/18/2020 5.70 (H) 4.80 - 5.60 % Final     Comment:     Hemoglobin A1C Ranges:  Increased Risk for Diabetes  5.7% to 6.4%  Diabetes                     >= 6.5%  Diabetic Goal                < 7.0%       Triglycerides   Date Value Ref Range Status   12/18/2020 76 0 - 150 mg/dL Final     HDL Cholesterol   Date Value Ref Range Status   12/18/2020 59 40 - 60 mg/dL Final     LDL Chol Calc (NIH)   Date Value Ref Range Status   12/18/2020 37 0 - 100 mg/dL Final     VLDL Cholesterol Kurt   Date Value Ref Range Status   12/18/2020 15 5 - 40 mg/dL Final         Assessment and Plan {CC Problem List  Visit Diagnosis  ROS  Review (Popup)  Health Maintenance  Quality  BestPractice  Medications  SmartSets  SnapShot Encounters  Media :23  Diagnoses and all orders for this visit:    1. Controlled type 2 diabetes mellitus without complication, without long-term current use of insulin (HCC) (Primary)  -     Semaglutide, 1 MG/DOSE, (Ozempic, 1 MG/DOSE,) 4 MG/3ML solution pen-injector; Inject 1 mg under the skin into the appropriate area as directed Every 7 (Seven) Days.  Dispense: 9 mL; Refill: 1  -     Comprehensive Metabolic Panel  -     Hemoglobin A1c  -     Microalbumin / Creatinine Urine Ratio - Urine, Clean Catch    Continue with Actos-Metformin and Ozempic  Check labs      2. Mixed hyperlipidemia  -      rosuvastatin (CRESTOR) 20 MG tablet; Take 1 tablet by mouth Daily.  Dispense: 90 tablet; Refill: 0  -     Comprehensive Metabolic Panel  -     Lipid Panel    Check labs today  Continue with statin and fenofibrate       3. Primary hypertension  -     Comprehensive Metabolic Panel    Stable  Continue with current medication regimen  Monitor      4. Vitamin D deficiency  -     vitamin D (ERGOCALCIFEROL) 1.25 MG (76459 UT) capsule capsule; Take 1 capsule by mouth 2 (Two) Times a Week. TAKE AS DIRECTED  Dispense: 24 capsule; Refill: 1  -     Vitamin D 25 Hydroxy    Check labs today  Continue with current treatment         Refills sent to pharmacy      RTC in 6 months with Dr. Canchola      Follow Up     Patient was given instructions and counseling regarding her condition or for health maintenance advice. Please see specific information pulled into the AVS if appropriate.              Melissa Park, THAIS  11/05/21

## 2021-11-06 LAB
25(OH)D3+25(OH)D2 SERPL-MCNC: 66.8 NG/ML (ref 30–100)
ALBUMIN SERPL-MCNC: 4.6 G/DL (ref 3.7–4.7)
ALBUMIN/CREAT UR: 11 MG/G CREAT (ref 0–29)
ALBUMIN/GLOB SERPL: 2.3 {RATIO} (ref 1.2–2.2)
ALP SERPL-CCNC: 25 IU/L (ref 44–121)
ALT SERPL-CCNC: 21 IU/L (ref 0–44)
AST SERPL-CCNC: 24 IU/L (ref 0–40)
BILIRUB SERPL-MCNC: 0.5 MG/DL (ref 0–1.2)
BUN SERPL-MCNC: 25 MG/DL (ref 8–27)
BUN/CREAT SERPL: 24 (ref 10–24)
CALCIUM SERPL-MCNC: 10 MG/DL (ref 8.6–10.2)
CHLORIDE SERPL-SCNC: 98 MMOL/L (ref 96–106)
CHOLEST SERPL-MCNC: 125 MG/DL (ref 100–199)
CO2 SERPL-SCNC: 25 MMOL/L (ref 20–29)
CREAT SERPL-MCNC: 1.03 MG/DL (ref 0.76–1.27)
CREAT UR-MCNC: 76.7 MG/DL
GLOBULIN SER CALC-MCNC: 2 G/DL (ref 1.5–4.5)
GLUCOSE SERPL-MCNC: 116 MG/DL (ref 65–99)
HBA1C MFR BLD: 6 % (ref 4.8–5.6)
HDLC SERPL-MCNC: 59 MG/DL
IMP & REVIEW OF LAB RESULTS: NORMAL
LDLC SERPL CALC-MCNC: 54 MG/DL (ref 0–99)
MICROALBUMIN UR-MCNC: 8.6 UG/ML
POTASSIUM SERPL-SCNC: 4.5 MMOL/L (ref 3.5–5.2)
PROT SERPL-MCNC: 6.6 G/DL (ref 6–8.5)
SODIUM SERPL-SCNC: 136 MMOL/L (ref 134–144)
TRIGL SERPL-MCNC: 50 MG/DL (ref 0–149)
VLDLC SERPL CALC-MCNC: 12 MG/DL (ref 5–40)

## 2021-11-08 NOTE — PROGRESS NOTES
Called and talked to patient-    A1C is at goal at 6.0    Lipid panel, kidney function, vitamin D levels are all normal    No protein in urine

## 2022-01-18 RX ORDER — ICOSAPENT ETHYL 1000 MG/1
CAPSULE ORAL
Qty: 360 CAPSULE | Refills: 1 | Status: SHIPPED | OUTPATIENT
Start: 2022-01-18 | End: 2022-01-19 | Stop reason: SDUPTHER

## 2022-01-18 RX ORDER — FENOFIBRATE 145 MG/1
145 TABLET, COATED ORAL DAILY
Qty: 90 TABLET | Refills: 1 | Status: SHIPPED | OUTPATIENT
Start: 2022-01-18 | End: 2022-05-20 | Stop reason: SDUPTHER

## 2022-01-19 ENCOUNTER — PRIOR AUTHORIZATION (OUTPATIENT)
Dept: ENDOCRINOLOGY | Age: 72
End: 2022-01-19

## 2022-01-19 RX ORDER — PIOGLITAZONE HCL AND METFORMIN HCL 500; 15 MG/1; MG/1
TABLET ORAL
Qty: 270 TABLET | Refills: 1 | Status: SHIPPED | OUTPATIENT
Start: 2022-01-19 | End: 2022-05-20 | Stop reason: SDUPTHER

## 2022-01-19 RX ORDER — ICOSAPENT ETHYL 1000 MG/1
CAPSULE ORAL
Qty: 360 CAPSULE | Refills: 1 | Status: SHIPPED | OUTPATIENT
Start: 2022-01-19 | End: 2022-05-20 | Stop reason: SDUPTHER

## 2022-02-14 DIAGNOSIS — I10 ESSENTIAL HYPERTENSION: ICD-10-CM

## 2022-02-15 DIAGNOSIS — E78.2 MIXED HYPERLIPIDEMIA: ICD-10-CM

## 2022-02-15 RX ORDER — IRBESARTAN AND HYDROCHLOROTHIAZIDE 150; 12.5 MG/1; MG/1
TABLET, FILM COATED ORAL
Qty: 180 TABLET | Refills: 1 | Status: SHIPPED | OUTPATIENT
Start: 2022-02-15 | End: 2022-05-20 | Stop reason: SDUPTHER

## 2022-02-17 RX ORDER — ROSUVASTATIN CALCIUM 20 MG/1
TABLET, COATED ORAL
Qty: 90 TABLET | Refills: 0 | Status: SHIPPED | OUTPATIENT
Start: 2022-02-17 | End: 2022-05-20 | Stop reason: SDUPTHER

## 2022-05-19 NOTE — PROGRESS NOTES
"Kalie Morales is a 72 y.o. male.     F/u for Melissa for dm 2, hyperlipidemia, vitamin d def, hypertension/ pt not testing bs / last dm eye exam  6/23/21/ last dm foot exam today with dr Canchola      Patient is a 72-year-old male who came in for follow-up.  He is new to me.    He has known diabetes mellitus since 2012.  He is on Actoplus MET 15/500 1 tablet every morning and 2 tablets every evening and Ozempic 1 mg weekly.  He does not check his blood sugars at home.  He denies hypoglycemic episodes.  He has gained 4 pounds since November 2021.  His last meal was last night.    His last eye examination was in June 2021.  He denies retinopathy.  He denies eye complaints.  Urine microalbumin was normal in November 2021.  He denies numbness, tingling or burning in his hands or feet.    He has hyperlipidemia and is on Crestor 20 mg/day, fenofibrate 145 mg/day, and Vascepa 2 g twice a day.  He denies myalgia.    He has hypertension and is on a irbesartan /12.5 mg 2 tablets daily.  He has no history of heart attack or stroke he denies chest pain or shortness of breath.    He has vitamin D deficiency and is on ergocalciferol 50,000 units twice a week.  He denies diarrhea.      The following portions of the patient's history were reviewed and updated as appropriate: allergies, current medications, past family history, past medical history, past social history, past surgical history and problem list.    Review of Systems   Respiratory: Negative for shortness of breath.    Cardiovascular: Negative for chest pain and palpitations.   Gastrointestinal: Negative.    Endocrine: Negative for cold intolerance and heat intolerance.   Genitourinary: Negative.    Musculoskeletal: Negative for myalgias.   Neurological: Negative for numbness.     Vitals:    05/20/22 0929   BP: 122/82   Pulse: 74   Temp: 97.5 °F (36.4 °C)   SpO2: 98%   Weight: 99.3 kg (218 lb 14.1 oz)   Height: 172.7 cm (67.99\")      Objective   Physical " Exam  Constitutional:       General: He is not in acute distress.     Appearance: Normal appearance. He is not ill-appearing or toxic-appearing.   Eyes:      General: No scleral icterus.        Right eye: No discharge.         Left eye: No discharge.      Conjunctiva/sclera: Conjunctivae normal.   Neck:      Vascular: No carotid bruit.   Cardiovascular:      Rate and Rhythm: Normal rate and regular rhythm.      Heart sounds: Normal heart sounds.   Pulmonary:      Effort: Pulmonary effort is normal. No respiratory distress.      Breath sounds: Normal breath sounds. No stridor. No rales.   Chest:      Chest wall: No tenderness.   Abdominal:      General: Bowel sounds are normal. There is no distension.      Palpations: Abdomen is soft. There is no mass.      Tenderness: There is no right CVA tenderness or left CVA tenderness.   Musculoskeletal:         General: Normal range of motion.      Cervical back: Normal range of motion. No rigidity or tenderness.      Comments: No plantar ulcers   Lymphadenopathy:      Cervical: No cervical adenopathy.   Skin:     General: Skin is warm and dry.   Neurological:      Mental Status: He is alert and oriented to person, place, and time.      Comments: Intact light touch in lower extremities.   Psychiatric:         Mood and Affect: Mood normal.         Behavior: Behavior normal.       Office Visit on 11/05/2021   Component Date Value Ref Range Status   • Glucose 11/05/2021 116 (A) 65 - 99 mg/dL Final   • BUN 11/05/2021 25  8 - 27 mg/dL Final   • Creatinine 11/05/2021 1.03  0.76 - 1.27 mg/dL Final   • eGFR Non  Am 11/05/2021 73  >59 mL/min/1.73 Final   • eGFR African Am 11/05/2021 84  >59 mL/min/1.73 Final    Comment: **In accordance with recommendations from the NKF-ASN Task force,**    [facility] is in the process of updating its eGFR calculation to the    2021 CKD-EPI creatinine equation that estimates kidney function    without a race variable.     • BUN/Creatinine Ratio  11/05/2021 24  10 - 24 Final   • Sodium 11/05/2021 136  134 - 144 mmol/L Final   • Potassium 11/05/2021 4.5  3.5 - 5.2 mmol/L Final   • Chloride 11/05/2021 98  96 - 106 mmol/L Final   • Total CO2 11/05/2021 25  20 - 29 mmol/L Final   • Calcium 11/05/2021 10.0  8.6 - 10.2 mg/dL Final   • Total Protein 11/05/2021 6.6  6.0 - 8.5 g/dL Final   • Albumin 11/05/2021 4.6  3.7 - 4.7 g/dL Final   • Globulin 11/05/2021 2.0  1.5 - 4.5 g/dL Final   • A/G Ratio 11/05/2021 2.3 (A) 1.2 - 2.2 Final   • Total Bilirubin 11/05/2021 0.5  0.0 - 1.2 mg/dL Final   • Alkaline Phosphatase 11/05/2021 25 (A) 44 - 121 IU/L Final                  **Please note reference interval change**   • AST (SGOT) 11/05/2021 24  0 - 40 IU/L Final   • ALT (SGPT) 11/05/2021 21  0 - 44 IU/L Final   • Total Cholesterol 11/05/2021 125  100 - 199 mg/dL Final   • Triglycerides 11/05/2021 50  0 - 149 mg/dL Final   • HDL Cholesterol 11/05/2021 59  >39 mg/dL Final   • VLDL Cholesterol Kurt 11/05/2021 12  5 - 40 mg/dL Final   • LDL Chol Calc (NIH) 11/05/2021 54  0 - 99 mg/dL Final   • Hemoglobin A1C 11/05/2021 6.0 (A) 4.8 - 5.6 % Final    Comment:          Prediabetes: 5.7 - 6.4           Diabetes: >6.4           Glycemic control for adults with diabetes: <7.0     • 25 Hydroxy, Vitamin D 11/05/2021 66.8  30.0 - 100.0 ng/mL Final    Comment: Vitamin D deficiency has been defined by the [place] of  Medicine and an Endocrine Society practice guideline as a  level of serum 25-OH vitamin D less than 20 ng/mL (1,2).  The Endocrine Society went on to further define vitamin D  insufficiency as a level between 21 and 29 ng/mL (2).  1. IOM ([place] of [employer]). 2010. Dietary reference     intakes for calcium and D. [place]: The     National Academies Press.  2. Cal HANSEN, Artur GALO, Lexie LACY, et al.     Evaluation, treatment, and prevention of vitamin D     deficiency: an Endocrine Society clinical practice     guideline. JCEM. 2011 Jul; 96(7):1911-30.      • Creatinine, Urine 11/05/2021 76.7  Not Estab. mg/dL Final   • Microalbumin, Urine 11/05/2021 8.6  Not Estab. ug/mL Final   • Microalbumin/Creatinine Ratio 11/05/2021 11  0 - 29 mg/g creat Final    Comment:                        Normal:                0 -  29                         Moderately increased: 30 - 300                         Severely increased:       >300     • Interpretation 11/05/2021 Note   Final    Supplemental report is available.     Assessment & Plan   Diagnoses and all orders for this visit:    1. Type 2 diabetes mellitus without complication, without long-term current use of insulin (HCC) (Primary)  -     Comprehensive Metabolic Panel  -     Hemoglobin A1c    2. Mixed hyperlipidemia  -     Comprehensive Metabolic Panel  -     Lipid Panel  -     TSH  -     T4, Free  -     rosuvastatin (CRESTOR) 20 MG tablet; Take 1 tablet by mouth Daily.  Dispense: 90 tablet; Refill: 1    3. Primary hypertension  -     Comprehensive Metabolic Panel    4. Vitamin D deficiency  -     Comprehensive Metabolic Panel  -     Vitamin D 25 Hydroxy  -     vitamin D (ERGOCALCIFEROL) 1.25 MG (45401 UT) capsule capsule; Take 1 capsule by mouth 2 (Two) Times a Week. TAKE AS DIRECTED  Dispense: 24 capsule; Refill: 1    5. Essential hypertension  -     irbesartan-hydrochlorothiazide (AVALIDE) 150-12.5 MG tablet; Take 2 tablets by mouth Daily.  Dispense: 180 tablet; Refill: 1    6. Controlled type 2 diabetes mellitus without complication, without long-term current use of insulin (HCC)  -     Semaglutide, 1 MG/DOSE, (Ozempic, 1 MG/DOSE,) 4 MG/3ML solution pen-injector; Inject 1 mg under the skin into the appropriate area as directed Every 7 (Seven) Days.  Dispense: 9 mL; Refill: 1    Other orders  -     fenofibrate (TRICOR) 145 MG tablet; Take 1 tablet by mouth Daily.  Dispense: 90 tablet; Refill: 1  -     pioglitazone-metFORMIN (ACTOPLUS MET)  MG per tablet; Take 1 tablet by mouth every morning and then take 2 tablets  by mouth every evening.  Dispense: 270 tablet; Refill: 1  -     Vascepa 1 g capsule capsule; TAKE FOUR CAPSULES BY MOUTH EVERY DAY  Dispense: 360 capsule; Refill: 1      Continue Actoplus MET and Ozempic.  Continue Crestor, fenofibrate, Vascepa, and low-fat diet.  Continue irbesartan 150/12.5 mg 2 tablets daily.  Continue ergocalciferol 50,000 units twice a week.    RTC 4 mos with COLTON Park NP.    Copy my note sent to JAVIER Valle.  Advised to set up a follow-up with PCP.

## 2022-05-20 ENCOUNTER — OFFICE VISIT (OUTPATIENT)
Dept: ENDOCRINOLOGY | Age: 72
End: 2022-05-20

## 2022-05-20 VITALS
HEIGHT: 68 IN | SYSTOLIC BLOOD PRESSURE: 122 MMHG | DIASTOLIC BLOOD PRESSURE: 82 MMHG | BODY MASS INDEX: 33.17 KG/M2 | TEMPERATURE: 97.5 F | HEART RATE: 74 BPM | WEIGHT: 218.88 LBS | OXYGEN SATURATION: 98 %

## 2022-05-20 DIAGNOSIS — E11.9 CONTROLLED TYPE 2 DIABETES MELLITUS WITHOUT COMPLICATION, WITHOUT LONG-TERM CURRENT USE OF INSULIN: ICD-10-CM

## 2022-05-20 DIAGNOSIS — I10 ESSENTIAL HYPERTENSION: ICD-10-CM

## 2022-05-20 DIAGNOSIS — I10 PRIMARY HYPERTENSION: ICD-10-CM

## 2022-05-20 DIAGNOSIS — E55.9 VITAMIN D DEFICIENCY: ICD-10-CM

## 2022-05-20 DIAGNOSIS — E11.9 TYPE 2 DIABETES MELLITUS WITHOUT COMPLICATION, WITHOUT LONG-TERM CURRENT USE OF INSULIN: Primary | ICD-10-CM

## 2022-05-20 DIAGNOSIS — E78.2 MIXED HYPERLIPIDEMIA: ICD-10-CM

## 2022-05-20 PROCEDURE — 99214 OFFICE O/P EST MOD 30 MIN: CPT | Performed by: INTERNAL MEDICINE

## 2022-05-20 RX ORDER — ROSUVASTATIN CALCIUM 20 MG/1
20 TABLET, COATED ORAL DAILY
Qty: 90 TABLET | Refills: 1 | Status: SHIPPED | OUTPATIENT
Start: 2022-05-20 | End: 2022-09-23 | Stop reason: SDUPTHER

## 2022-05-20 RX ORDER — ERGOCALCIFEROL 1.25 MG/1
50000 CAPSULE ORAL 2 TIMES WEEKLY
Qty: 24 CAPSULE | Refills: 1 | Status: SHIPPED | OUTPATIENT
Start: 2022-05-23 | End: 2022-09-23 | Stop reason: SDUPTHER

## 2022-05-20 RX ORDER — PIOGLITAZONE HCL AND METFORMIN HCL 500; 15 MG/1; MG/1
TABLET ORAL
Qty: 270 TABLET | Refills: 1 | Status: SHIPPED | OUTPATIENT
Start: 2022-05-20 | End: 2022-09-23 | Stop reason: SDUPTHER

## 2022-05-20 RX ORDER — SEMAGLUTIDE 1.34 MG/ML
1 INJECTION, SOLUTION SUBCUTANEOUS
Qty: 9 ML | Refills: 1 | Status: SHIPPED | OUTPATIENT
Start: 2022-05-20 | End: 2022-12-08

## 2022-05-20 RX ORDER — ICOSAPENT ETHYL 1000 MG/1
CAPSULE ORAL
Qty: 360 CAPSULE | Refills: 1 | Status: SHIPPED | OUTPATIENT
Start: 2022-05-20 | End: 2022-09-23 | Stop reason: SDUPTHER

## 2022-05-20 RX ORDER — FENOFIBRATE 145 MG/1
145 TABLET, COATED ORAL DAILY
Qty: 90 TABLET | Refills: 1 | Status: SHIPPED | OUTPATIENT
Start: 2022-05-20 | End: 2022-09-23 | Stop reason: SDUPTHER

## 2022-05-20 RX ORDER — IRBESARTAN AND HYDROCHLOROTHIAZIDE 150; 12.5 MG/1; MG/1
2 TABLET, FILM COATED ORAL DAILY
Qty: 180 TABLET | Refills: 1 | Status: SHIPPED | OUTPATIENT
Start: 2022-05-20 | End: 2022-07-19

## 2022-05-21 LAB
25(OH)D3+25(OH)D2 SERPL-MCNC: 72.7 NG/ML (ref 30–100)
ALBUMIN SERPL-MCNC: 4.6 G/DL (ref 3.7–4.7)
ALBUMIN/GLOB SERPL: 2.1 {RATIO} (ref 1.2–2.2)
ALP SERPL-CCNC: 24 IU/L (ref 44–121)
ALT SERPL-CCNC: 20 IU/L (ref 0–44)
AST SERPL-CCNC: 24 IU/L (ref 0–40)
BILIRUB SERPL-MCNC: 0.5 MG/DL (ref 0–1.2)
BUN SERPL-MCNC: 33 MG/DL (ref 8–27)
BUN/CREAT SERPL: 26 (ref 10–24)
CALCIUM SERPL-MCNC: 10 MG/DL (ref 8.6–10.2)
CHLORIDE SERPL-SCNC: 95 MMOL/L (ref 96–106)
CHOLEST SERPL-MCNC: 110 MG/DL (ref 100–199)
CO2 SERPL-SCNC: 21 MMOL/L (ref 20–29)
CREAT SERPL-MCNC: 1.26 MG/DL (ref 0.76–1.27)
EGFRCR SERPLBLD CKD-EPI 2021: 61 ML/MIN/1.73
GLOBULIN SER CALC-MCNC: 2.2 G/DL (ref 1.5–4.5)
GLUCOSE SERPL-MCNC: 108 MG/DL (ref 65–99)
HBA1C MFR BLD: 6.1 % (ref 4.8–5.6)
HDLC SERPL-MCNC: 51 MG/DL
IMP & REVIEW OF LAB RESULTS: NORMAL
LDLC SERPL CALC-MCNC: 43 MG/DL (ref 0–99)
POTASSIUM SERPL-SCNC: 5.5 MMOL/L (ref 3.5–5.2)
PROT SERPL-MCNC: 6.8 G/DL (ref 6–8.5)
SODIUM SERPL-SCNC: 130 MMOL/L (ref 134–144)
T4 FREE SERPL-MCNC: 1.36 NG/DL (ref 0.82–1.77)
TRIGL SERPL-MCNC: 81 MG/DL (ref 0–149)
TSH SERPL DL<=0.005 MIU/L-ACNC: 2.91 UIU/ML (ref 0.45–4.5)
VLDLC SERPL CALC-MCNC: 16 MG/DL (ref 5–40)

## 2022-05-22 DIAGNOSIS — E87.1 HYPONATREMIA: Primary | ICD-10-CM

## 2022-05-22 NOTE — PROGRESS NOTES
Sodium 130.  Potassium 5.5.  Sodium low and potassium elevated.  Ask patient to come in for repeat BMP, ACTH, cortisol, and 21-hydroxylase antibody to be done at 8 AM to 9 AM.  Orders placed on chart..  LDL 43.  HDL 51.  Cholesterol 110.  Continue Crestor 20 mg/day, fenofibrate 145 mg/day and Vascepa 2 g twice a day.  Hemoglobin A1c 6.1%.  Diabetes well controlled.  Normal thyroid function tests.  Normal vitamin D is 72.7 nanograms per mL.  Continue ergocalciferol 50,000 units twice a week.  Copy of labs sent to JAVIER Valle.  Please notify patient of results and instructions.

## 2022-05-25 ENCOUNTER — LAB (OUTPATIENT)
Dept: ENDOCRINOLOGY | Age: 72
End: 2022-05-25

## 2022-05-25 DIAGNOSIS — E87.1 HYPONATREMIA: ICD-10-CM

## 2022-06-04 LAB
21HYDROXYLASE AB SER-ACNC: NEGATIVE
ACTH PLAS-MCNC: 33.3 PG/ML (ref 7.2–63.3)
ALBUMIN SERPL-MCNC: 4.2 G/DL (ref 3.7–4.7)
ALBUMIN/GLOB SERPL: 1.9 {RATIO} (ref 1.2–2.2)
ALP SERPL-CCNC: 24 IU/L (ref 44–121)
ALT SERPL-CCNC: 18 IU/L (ref 0–44)
AST SERPL-CCNC: 23 IU/L (ref 0–40)
BILIRUB SERPL-MCNC: 0.5 MG/DL (ref 0–1.2)
BUN SERPL-MCNC: 30 MG/DL (ref 8–27)
BUN/CREAT SERPL: 25 (ref 10–24)
CALCIUM SERPL-MCNC: 9.7 MG/DL (ref 8.6–10.2)
CHLORIDE SERPL-SCNC: 95 MMOL/L (ref 96–106)
CO2 SERPL-SCNC: 21 MMOL/L (ref 20–29)
CORTIS AM PEAK SERPL-MCNC: 16 UG/DL (ref 6.2–19.4)
CREAT SERPL-MCNC: 1.22 MG/DL (ref 0.76–1.27)
EGFRCR SERPLBLD CKD-EPI 2021: 63 ML/MIN/1.73
GLOBULIN SER CALC-MCNC: 2.2 G/DL (ref 1.5–4.5)
GLUCOSE SERPL-MCNC: 100 MG/DL (ref 65–99)
POTASSIUM SERPL-SCNC: 5.1 MMOL/L (ref 3.5–5.2)
PROT SERPL-MCNC: 6.4 G/DL (ref 6–8.5)
SODIUM SERPL-SCNC: 130 MMOL/L (ref 134–144)

## 2022-07-19 ENCOUNTER — OFFICE VISIT (OUTPATIENT)
Dept: FAMILY MEDICINE CLINIC | Facility: CLINIC | Age: 72
End: 2022-07-19

## 2022-07-19 VITALS
RESPIRATION RATE: 16 BRPM | TEMPERATURE: 97.5 F | HEIGHT: 68 IN | WEIGHT: 219 LBS | HEART RATE: 76 BPM | SYSTOLIC BLOOD PRESSURE: 110 MMHG | BODY MASS INDEX: 33.19 KG/M2 | OXYGEN SATURATION: 99 % | DIASTOLIC BLOOD PRESSURE: 66 MMHG

## 2022-07-19 DIAGNOSIS — E55.9 VITAMIN D DEFICIENCY: ICD-10-CM

## 2022-07-19 DIAGNOSIS — I35.0 NONRHEUMATIC AORTIC VALVE STENOSIS: ICD-10-CM

## 2022-07-19 DIAGNOSIS — I10 ESSENTIAL HYPERTENSION: Primary | ICD-10-CM

## 2022-07-19 DIAGNOSIS — E78.2 MIXED HYPERLIPIDEMIA: ICD-10-CM

## 2022-07-19 DIAGNOSIS — Z12.5 SCREENING PSA (PROSTATE SPECIFIC ANTIGEN): ICD-10-CM

## 2022-07-19 DIAGNOSIS — N20.0 RENAL STONES: ICD-10-CM

## 2022-07-19 DIAGNOSIS — Z12.11 SCREEN FOR COLON CANCER: ICD-10-CM

## 2022-07-19 DIAGNOSIS — E11.9 TYPE 2 DIABETES MELLITUS WITHOUT COMPLICATION, WITHOUT LONG-TERM CURRENT USE OF INSULIN: ICD-10-CM

## 2022-07-19 DIAGNOSIS — I65.23 BILATERAL CAROTID ARTERY STENOSIS: ICD-10-CM

## 2022-07-19 DIAGNOSIS — R09.89 BILATERAL CAROTID BRUITS: ICD-10-CM

## 2022-07-19 DIAGNOSIS — D64.9 LOW HEMOGLOBIN: ICD-10-CM

## 2022-07-19 DIAGNOSIS — R01.1 HEART MURMUR: ICD-10-CM

## 2022-07-19 PROCEDURE — 1159F MED LIST DOCD IN RCRD: CPT | Performed by: PHYSICIAN ASSISTANT

## 2022-07-19 PROCEDURE — G0439 PPPS, SUBSEQ VISIT: HCPCS | Performed by: PHYSICIAN ASSISTANT

## 2022-07-19 PROCEDURE — 96160 PT-FOCUSED HLTH RISK ASSMT: CPT | Performed by: PHYSICIAN ASSISTANT

## 2022-07-19 PROCEDURE — 1170F FXNL STATUS ASSESSED: CPT | Performed by: PHYSICIAN ASSISTANT

## 2022-07-19 PROCEDURE — 93000 ELECTROCARDIOGRAM COMPLETE: CPT | Performed by: PHYSICIAN ASSISTANT

## 2022-07-19 PROCEDURE — 99204 OFFICE O/P NEW MOD 45 MIN: CPT | Performed by: PHYSICIAN ASSISTANT

## 2022-07-19 RX ORDER — IRBESARTAN 150 MG/1
150 TABLET ORAL DAILY
Qty: 90 TABLET | Refills: 1 | Status: SHIPPED | OUTPATIENT
Start: 2022-07-19 | End: 2022-12-12

## 2022-07-19 RX ORDER — AMLODIPINE BESYLATE 2.5 MG/1
2.5 TABLET ORAL DAILY
Qty: 90 TABLET | Refills: 1 | Status: SHIPPED | OUTPATIENT
Start: 2022-07-19 | End: 2022-12-12

## 2022-07-19 NOTE — PROGRESS NOTES
Subjective   Reji Morales is a 72 y.o. male.     History of Present Illness   Reji Morales 72 y.o. male who presents today for a new patient appointment.    he has a history of   Patient Active Problem List   Diagnosis   • Hyperlipidemia   • Essential hypertension   • Hypogonadism in male   • Vitamin D deficiency   • Low testosterone   • Benign non-nodular prostatic hyperplasia without lower urinary tract symptoms   • Anemia   .  he is here to re-establish care I reviewed the PFSH recorded today by my MA/LPN staff.   he   He has been feeling well.   he has overall felt well.  He has Hyperlipidemia with goals met with current Rx, Vitamin D deficiency and labs are at goal >30 ng/mL and Primary hypertension and see notes below about stopping irbesartan hydrochlorothiazide due to low sodium and changing to irbesartan separate from amlodipine 2.5 mg and updating labs.  he has been compliant with current medications have reviewed them.  The patient denies medication side effects.  Will refill medications. There were no vitals taken for this visit.  Need screening PSA and will update B12 folic acid and CBC count will also follow-up with BMP to look at sodium again  Out of Prevnar 20 and will have him get at pharmacy  Results for orders placed or performed in visit on 05/25/22   Adrenal 21-Hydroxylase Autoantibodies    Specimen: Blood   Result Value Ref Range    21-Hydroxylase Antibodies Negative    Cortisol - AM    Specimen: Blood   Result Value Ref Range    Cortisol - AM 16.0 6.2 - 19.4 ug/dL   ACTH    Specimen: Blood   Result Value Ref Range    ACTH 33.3 7.2 - 63.3 pg/mL   Comprehensive Metabolic Panel    Specimen: Blood   Result Value Ref Range    Glucose 100 (H) 65 - 99 mg/dL    BUN 30 (H) 8 - 27 mg/dL    Creatinine 1.22 0.76 - 1.27 mg/dL    EGFR Result 63 >59 mL/min/1.73    BUN/Creatinine Ratio 25 (H) 10 - 24    Sodium 130 (L) 134 - 144 mmol/L    Potassium 5.1 3.5 - 5.2 mmol/L    Chloride 95 (L) 96 - 106 mmol/L    Total  CO2 21 20 - 29 mmol/L    Calcium 9.7 8.6 - 10.2 mg/dL    Total Protein 6.4 6.0 - 8.5 g/dL    Albumin 4.2 3.7 - 4.7 g/dL    Globulin 2.2 1.5 - 4.5 g/dL    A/G Ratio 1.9 1.2 - 2.2    Total Bilirubin 0.5 0.0 - 1.2 mg/dL    Alkaline Phosphatase 24 (L) 44 - 121 IU/L    AST (SGOT) 23 0 - 40 IU/L    ALT (SGPT) 18 0 - 44 IU/L       Need records from 7/22/2019 that patient had been to urology Dr. Hampton noted nodular prostate and due to age follow-up as needed.    Patient last saw endocrinology on 5/20/2022 for his type 2 diabetes, mixed hyperlipidemia, noted primary hypertension.  Also vitamin D deficiency supplemented with 50,000 IU vitamin D twice a week. Dr Canchola copy me on his labs noting his potassium was 5.5 and need to be repeated.  And his LDL cholesterol was at goal at 43, to continue Crestor, fenofibrate, Vascepa A1c at 6.1% well controlled.  Normal thyroid functions..  Patient did follow-up with labs and note his potassium level was 5.1 on 5/25/2022.  Endocrinology noted his hyponatremia and did order additional labs to include ACTH, adrenal 21-hydroxylase autoantibodies, cortisol.  Seen with  reviewed his results on 6/5/2022 noted that his serum sodium was stable with normal ACTH, cortisol and 21 hydroxylase antibody.  Low serum sodium may be due to hydrochlorothiazide and suggest irbesartan hydrochlorothiazide and take off HCTZ.  Also to manage his hypertension.  The following portions of the patient's history were reviewed and updated as appropriate: allergies, current medications, past family history, past medical history, past social history, past surgical history and problem list.    Review of Systems   Constitutional: Negative for activity change, appetite change and unexpected weight change.   HENT: Negative for nosebleeds and trouble swallowing.    Eyes: Negative for pain and visual disturbance.   Respiratory: Negative for chest tightness, shortness of breath and wheezing.    Cardiovascular:  Negative for chest pain and palpitations.   Gastrointestinal: Negative for abdominal pain and blood in stool.   Endocrine: Negative.    Genitourinary: Negative for difficulty urinating and hematuria.   Musculoskeletal: Negative for joint swelling.   Skin: Negative for color change and rash.   Allergic/Immunologic: Negative.    Neurological: Negative for syncope and speech difficulty.   Hematological: Negative for adenopathy.   Psychiatric/Behavioral: Negative for agitation and confusion.   All other systems reviewed and are negative.      Objective   Physical Exam  Vitals and nursing note reviewed.   Constitutional:       General: He is not in acute distress.     Appearance: He is well-developed. He is obese. He is not diaphoretic.   HENT:      Head: Normocephalic.      Right Ear: External ear normal.      Left Ear: External ear normal.   Eyes:      General: No scleral icterus.     Conjunctiva/sclera: Conjunctivae normal.      Pupils: Pupils are equal, round, and reactive to light.   Neck:      Vascular: Carotid bruit present.      Comments: L>R bruit  Cardiovascular:      Rate and Rhythm: Normal rate and regular rhythm.      Pulses: Normal pulses.      Heart sounds: Murmur heard.   Pulmonary:      Effort: Pulmonary effort is normal.      Breath sounds: Normal breath sounds. No rales.   Abdominal:      Palpations: Abdomen is soft.      Tenderness: There is no abdominal tenderness.   Musculoskeletal:         General: Normal range of motion.      Cervical back: Normal range of motion and neck supple.      Right lower leg: Edema present.      Left lower leg: Edema present.      Comments: Trace pedal edema = bilat     Skin:     General: Skin is warm and dry.      Coloration: Skin is not jaundiced.      Findings: No rash.   Neurological:      General: No focal deficit present.      Mental Status: He is alert and oriented to person, place, and time.   Psychiatric:         Mood and Affect: Mood normal. Affect is not  inappropriate.         Behavior: Behavior normal.         Thought Content: Thought content normal.         Judgment: Judgment normal.         Assessment & Plan   Diagnoses and all orders for this visit:    1. Essential hypertension (Primary)  -     ECG 12 Lead    2. Mixed hyperlipidemia    3. Vitamin D deficiency    4. Type 2 diabetes mellitus without complication, without long-term current use of insulin (HCC)    5. Renal stones    update urine studies  Stop HCTZ d/t sodium----sees endocrine  Screen colon cancer  Update B12, folate, CBC, screening PSA  New heart murmur and carotid bruits bilat L>R  Plan, Reji Morales, was seen today.  he was seen for DMII followed by Endocrinologist, Hyperlipidemia and will continue current medication, Vitamin D deficiency and supplemented and Primary hypertension and due to his low sodium and notes from endocrinology we will stop the irbesartan hydrochlorothiazide and keep irbesartan and change hydrochlorothiazide to amlodipine 2.5 mg and monitor blood pressure.  Stop HCTZ and change to Amlodipine 2.5mg to start. To see endocrine Sept and will do bp

## 2022-07-19 NOTE — PROGRESS NOTES
Procedure     ECG 12 Lead    Date/Time: 7/19/2022 9:34 AM  Performed by: Terri Park PA-C  Authorized by: Terri Park PA-C   Comparison: not compared with previous ECG   Rhythm: sinus rhythm  Rate: normal  BPM: 71  Conduction: conduction normal  ST Segments: ST segments normal  T Waves: T waves normal  QRS axis: normal  Other: no other findings    Clinical impression: normal ECG  Comments: RSR V1, EKG Interpretation Report    Heart rate:    71 beats/min, VA interval:  166 msec, QRS duration:  100 msec  QTu:388 msec, QTc:  422 msec

## 2022-07-19 NOTE — PROGRESS NOTES
The ABCs of the Annual Wellness Visit  Subsequent Medicare Wellness Visit    Chief Complaint   Patient presents with   • Establish Care   • Medicare Wellness-subsequent      Subjective    History of Present Illness:  Reji Morales is a 72 y.o. male who presents for a Subsequent Medicare Wellness Visit.    The following portions of the patient's history were reviewed and   updated as appropriate: allergies, current medications, past family history, past medical history, past social history, past surgical history and problem list.    Compared to one year ago, the patient feels his physical   health is the same.    Compared to one year ago, the patient feels his mental   health is the same.    Recent Hospitalizations:  He was not admitted to the hospital during the last year.       Current Medical Providers:  Patient Care Team:  Terri Park PA-C as PCP - General (Family Medicine)  Dimas Hampton MD as Consulting Physician (Urology)  Moses Canchola MD as Consulting Physician (Endocrinology)    Outpatient Medications Prior to Visit   Medication Sig Dispense Refill   • aspirin 81 MG disintegrating tablet Take  by mouth.     • fenofibrate (TRICOR) 145 MG tablet Take 1 tablet by mouth Daily. 90 tablet 1   • irbesartan-hydrochlorothiazide (AVALIDE) 150-12.5 MG tablet Take 2 tablets by mouth Daily. 180 tablet 1   • pioglitazone-metFORMIN (ACTOPLUS MET)  MG per tablet Take 1 tablet by mouth every morning and then take 2 tablets by mouth every evening. 270 tablet 1   • rosuvastatin (CRESTOR) 20 MG tablet Take 1 tablet by mouth Daily. 90 tablet 1   • Semaglutide, 1 MG/DOSE, (Ozempic, 1 MG/DOSE,) 4 MG/3ML solution pen-injector Inject 1 mg under the skin into the appropriate area as directed Every 7 (Seven) Days. 9 mL 1   • Vascepa 1 g capsule capsule TAKE FOUR CAPSULES BY MOUTH EVERY  capsule 1   • vitamin b complex (TOTAL B/C) tablet tablet Take  by mouth.     • vitamin D (ERGOCALCIFEROL) 1.25 MG (80104  "UT) capsule capsule Take 1 capsule by mouth 2 (Two) Times a Week. TAKE AS DIRECTED 24 capsule 1     No facility-administered medications prior to visit.       No opioid medication identified on active medication list. I have reviewed chart for other potential  high risk medication/s and harmful drug interactions in the elderly.          Aspirin is on active medication list. Aspirin use is indicated based on review of current medical condition/s. Pros and cons of this therapy have been discussed today. Benefits of this medication outweigh potential harm.  Patient has been encouraged to continue taking this medication.  .      Patient Active Problem List   Diagnosis   • Hyperlipidemia   • Essential hypertension   • Hypogonadism in male   • Vitamin D deficiency   • Low testosterone   • Benign non-nodular prostatic hyperplasia without lower urinary tract symptoms   • Anemia     Advance Care Planning  Advance Directive is on file.  ACP discussion was held with the patient during this visit. Patient has an advance directive in EMR which is still valid.           Objective    Vitals:    07/19/22 0913   BP: 110/66   BP Location: Left arm   Patient Position: Sitting   Cuff Size: Adult   Pulse: 76   Resp: 16   Temp: 97.5 °F (36.4 °C)   SpO2: 99%   Weight: 99.3 kg (219 lb)   Height: 172.7 cm (67.99\")     Estimated body mass index is 33.31 kg/m² as calculated from the following:    Height as of this encounter: 172.7 cm (67.99\").    Weight as of this encounter: 99.3 kg (219 lb).    BMI is >= 30 and <35. (Class 1 Obesity). The following options were offered after discussion;: weight loss educational material (shared in after visit summary)      Does the patient have evidence of cognitive impairment? No    Physical Exam  Lab Results   Component Value Date    CHLPL 110 05/20/2022    TRIG 81 05/20/2022    HDL 51 05/20/2022    LDL 43 05/20/2022    VLDL 16 05/20/2022    HGBA1C 6.1 (H) 05/20/2022            HEALTH RISK " ASSESSMENT    Smoking Status:  Social History     Tobacco Use   Smoking Status Never Smoker   Smokeless Tobacco Never Used     Alcohol Consumption:  Social History     Substance and Sexual Activity   Alcohol Use Yes    Comment: socail      Fall Risk Screen:    STEADI Fall Risk Assessment was completed, and patient is at LOW risk for falls.Assessment completed on:7/19/2022    Depression Screening:  PHQ-2/PHQ-9 Depression Screening 7/19/2022   Little Interest or Pleasure in Doing Things 0-->not at all   Feeling Down, Depressed or Hopeless 0-->not at all   PHQ-9: Brief Depression Severity Measure Score 0       Health Habits and Functional and Cognitive Screening:  Functional & Cognitive Status 7/19/2022   Do you have difficulty preparing food and eating? No   Do you have difficulty bathing yourself, getting dressed or grooming yourself? No   Do you have difficulty using the toilet? No   Do you have difficulty moving around from place to place? No   Do you have trouble with steps or getting out of a bed or a chair? No   Current Diet Well Balanced Diet   Dental Exam Up to date   Eye Exam Up to date   Exercise (times per week) 5 times per week   Current Exercises Include Walking   Do you need help using the phone?  No   Are you deaf or do you have serious difficulty hearing?  No   Do you need help with transportation? No   Do you need help shopping? No   Do you need help preparing meals?  No   Do you need help with housework?  No   Do you need help with laundry? No   Do you need help taking your medications? No   Do you need help managing money? No   Do you ever drive or ride in a car without wearing a seat belt? No   Have you felt unusual stress, anger or loneliness in the last month? No   Who do you live with? Alone   If you need help, do you have trouble finding someone available to you? No   Have you been bothered in the last four weeks by sexual problems? No   Do you have difficulty concentrating, remembering or  making decisions? No       Age-appropriate Screening Schedule:  Refer to the list below for future screening recommendations based on patient's age, sex and/or medical conditions. Orders for these recommended tests are listed in the plan section. The patient has been provided with a written plan.    Health Maintenance   Topic Date Due   • ZOSTER VACCINE (1 of 2) Never done   • TDAP/TD VACCINES (2 - Tdap) 03/05/2007   • DIABETIC FOOT EXAM  07/10/2021   • DIABETIC EYE EXAM  06/23/2022   • INFLUENZA VACCINE  10/01/2022   • URINE MICROALBUMIN  11/05/2022   • HEMOGLOBIN A1C  11/20/2022   • LIPID PANEL  05/20/2023              Assessment & Plan   CMS Preventative Services Quick Reference  Risk Factors Identified During Encounter  Cardiovascular Disease  The above risks/problems have been discussed with the patient.  Follow up actions/plans if indicated are seen below in the Assessment/Plan Section.  Pertinent information has been shared with the patient in the After Visit Summary.    There are no diagnoses linked to this encounter.    Follow Up:   No follow-ups on file.     An After Visit Summary and PPPS were made available to the patient.

## 2022-07-20 LAB
ALBUMIN/CREAT UR: <7 MG/G CREAT (ref 0–29)
APPEARANCE UR: CLEAR
BACTERIA #/AREA URNS HPF: NORMAL /[HPF]
BASOPHILS # BLD AUTO: 0 X10E3/UL (ref 0–0.2)
BASOPHILS NFR BLD AUTO: 0 %
BILIRUB UR QL STRIP: NEGATIVE
BUN SERPL-MCNC: 21 MG/DL (ref 8–27)
BUN/CREAT SERPL: 21 (ref 10–24)
CALCIUM SERPL-MCNC: 10 MG/DL (ref 8.6–10.2)
CASTS URNS QL MICRO: NORMAL /LPF
CHLORIDE SERPL-SCNC: 96 MMOL/L (ref 96–106)
CO2 SERPL-SCNC: 23 MMOL/L (ref 20–29)
COLOR UR: YELLOW
CREAT SERPL-MCNC: 1.01 MG/DL (ref 0.76–1.27)
CREAT UR-MCNC: 45.7 MG/DL
EGFRCR SERPLBLD CKD-EPI 2021: 79 ML/MIN/1.73
EOSINOPHIL # BLD AUTO: 0.1 X10E3/UL (ref 0–0.4)
EOSINOPHIL NFR BLD AUTO: 2 %
EPI CELLS #/AREA URNS HPF: NORMAL /HPF (ref 0–10)
ERYTHROCYTE [DISTWIDTH] IN BLOOD BY AUTOMATED COUNT: 13.1 % (ref 11.6–15.4)
FOLATE SERPL-MCNC: >20 NG/ML
GLUCOSE SERPL-MCNC: 98 MG/DL (ref 65–99)
GLUCOSE UR QL STRIP: NEGATIVE
HCT VFR BLD AUTO: 36.7 % (ref 37.5–51)
HGB BLD-MCNC: 12.8 G/DL (ref 13–17.7)
HGB UR QL STRIP: NEGATIVE
IMM GRANULOCYTES # BLD AUTO: 0 X10E3/UL (ref 0–0.1)
IMM GRANULOCYTES NFR BLD AUTO: 0 %
KETONES UR QL STRIP: NEGATIVE
LEUKOCYTE ESTERASE UR QL STRIP: NEGATIVE
LYMPHOCYTES # BLD AUTO: 1.2 X10E3/UL (ref 0.7–3.1)
LYMPHOCYTES NFR BLD AUTO: 17 %
MCH RBC QN AUTO: 32.5 PG (ref 26.6–33)
MCHC RBC AUTO-ENTMCNC: 34.9 G/DL (ref 31.5–35.7)
MCV RBC AUTO: 93 FL (ref 79–97)
MICRO URNS: NORMAL
MICRO URNS: NORMAL
MICROALBUMIN UR-MCNC: <3 UG/ML
MONOCYTES # BLD AUTO: 0.7 X10E3/UL (ref 0.1–0.9)
MONOCYTES NFR BLD AUTO: 10 %
NEUTROPHILS # BLD AUTO: 5 X10E3/UL (ref 1.4–7)
NEUTROPHILS NFR BLD AUTO: 71 %
NITRITE UR QL STRIP: NEGATIVE
PH UR STRIP: 6 [PH] (ref 5–7.5)
PLATELET # BLD AUTO: 340 X10E3/UL (ref 150–450)
POTASSIUM SERPL-SCNC: 4.8 MMOL/L (ref 3.5–5.2)
PROT UR QL STRIP: NEGATIVE
PSA SERPL-MCNC: 0.6 NG/ML (ref 0–4)
RBC # BLD AUTO: 3.94 X10E6/UL (ref 4.14–5.8)
RBC #/AREA URNS HPF: NORMAL /HPF (ref 0–2)
SODIUM SERPL-SCNC: 133 MMOL/L (ref 134–144)
SP GR UR STRIP: 1.01 (ref 1–1.03)
UROBILINOGEN UR STRIP-MCNC: 0.2 MG/DL (ref 0.2–1)
VIT B12 SERPL-MCNC: 493 PG/ML (ref 232–1245)
WBC # BLD AUTO: 7.1 X10E3/UL (ref 3.4–10.8)
WBC #/AREA URNS HPF: NORMAL /HPF (ref 0–5)

## 2022-07-21 LAB
FERRITIN SERPL-MCNC: 120 NG/ML (ref 30–400)
IRON SATN MFR SERPL: 25 % (ref 15–55)
IRON SERPL-MCNC: 108 UG/DL (ref 38–169)
Lab: NORMAL
TIBC SERPL-MCNC: 424 UG/DL (ref 250–450)
UIBC SERPL-MCNC: 316 UG/DL (ref 111–343)
WRITTEN AUTHORIZATION: NORMAL

## 2022-07-28 ENCOUNTER — TELEPHONE (OUTPATIENT)
Dept: FAMILY MEDICINE CLINIC | Facility: CLINIC | Age: 72
End: 2022-07-28

## 2022-07-28 DIAGNOSIS — E11.9 TYPE 2 DIABETES MELLITUS WITHOUT COMPLICATION, WITHOUT LONG-TERM CURRENT USE OF INSULIN: Primary | ICD-10-CM

## 2022-07-28 LAB
EXPIRATION DATE 2: NORMAL
EXPIRATION DATE 3: NORMAL
EXPIRATION DATE: NORMAL
GASTROCULT GAST QL: NEGATIVE
HEMOCCULT SP2 STL QL: NEGATIVE
HEMOCCULT SP3 STL QL: NEGATIVE
Lab: NORMAL

## 2022-07-28 PROCEDURE — 82274 ASSAY TEST FOR BLOOD FECAL: CPT | Performed by: PHYSICIAN ASSISTANT

## 2022-08-09 ENCOUNTER — HOSPITAL ENCOUNTER (OUTPATIENT)
Dept: CARDIOLOGY | Facility: HOSPITAL | Age: 72
Discharge: HOME | End: 2022-08-09

## 2022-08-09 VITALS
HEIGHT: 68 IN | DIASTOLIC BLOOD PRESSURE: 82 MMHG | BODY MASS INDEX: 33.19 KG/M2 | HEART RATE: 75 BPM | WEIGHT: 219 LBS | SYSTOLIC BLOOD PRESSURE: 150 MMHG | OXYGEN SATURATION: 98 %

## 2022-08-09 DIAGNOSIS — R09.89 BILATERAL CAROTID BRUITS: ICD-10-CM

## 2022-08-09 DIAGNOSIS — R01.1 HEART MURMUR: ICD-10-CM

## 2022-08-09 PROBLEM — I35.0 NONRHEUMATIC AORTIC VALVE STENOSIS: Status: ACTIVE | Noted: 2022-08-09

## 2022-08-09 PROBLEM — I65.23 BILATERAL CAROTID ARTERY STENOSIS: Status: ACTIVE | Noted: 2022-08-09

## 2022-08-09 LAB
AORTIC ARCH: 2 CM
ASCENDING AORTA: 3.1 CM
BH CV ECHO MEAS - ACS: 1.47 CM
BH CV ECHO MEAS - AO MAX PG: 39.7 MMHG
BH CV ECHO MEAS - AO MEAN PG: 24.2 MMHG
BH CV ECHO MEAS - AO ROOT DIAM: 3.3 CM
BH CV ECHO MEAS - AO V2 MAX: 315.1 CM/SEC
BH CV ECHO MEAS - AO V2 VTI: 75.9 CM
BH CV ECHO MEAS - AVA(I,D): 1.4 CM2
BH CV ECHO MEAS - EDV(CUBED): 120 ML
BH CV ECHO MEAS - EDV(MOD-SP2): 121 ML
BH CV ECHO MEAS - EDV(MOD-SP4): 112 ML
BH CV ECHO MEAS - EF(MOD-BP): 73.4 %
BH CV ECHO MEAS - EF(MOD-SP2): 70.2 %
BH CV ECHO MEAS - EF(MOD-SP4): 75.9 %
BH CV ECHO MEAS - ESV(CUBED): 16.4 ML
BH CV ECHO MEAS - ESV(MOD-SP2): 36 ML
BH CV ECHO MEAS - ESV(MOD-SP4): 27 ML
BH CV ECHO MEAS - FS: 48.5 %
BH CV ECHO MEAS - IVS/LVPW: 1.04 CM
BH CV ECHO MEAS - IVSD: 0.96 CM
BH CV ECHO MEAS - LAT PEAK E' VEL: 10.7 CM/SEC
BH CV ECHO MEAS - LV DIASTOLIC VOL/BSA (35-75): 52.7 CM2
BH CV ECHO MEAS - LV MASS(C)D: 164.6 GRAMS
BH CV ECHO MEAS - LV MAX PG: 6 MMHG
BH CV ECHO MEAS - LV MEAN PG: 3.9 MMHG
BH CV ECHO MEAS - LV SYSTOLIC VOL/BSA (12-30): 12.7 CM2
BH CV ECHO MEAS - LV V1 MAX: 123 CM/SEC
BH CV ECHO MEAS - LV V1 VTI: 32.8 CM
BH CV ECHO MEAS - LVIDD: 4.9 CM
BH CV ECHO MEAS - LVIDS: 2.5 CM
BH CV ECHO MEAS - LVOT AREA: 3.8 CM2
BH CV ECHO MEAS - LVOT DIAM: 2.2 CM
BH CV ECHO MEAS - LVPWD: 0.92 CM
BH CV ECHO MEAS - MED PEAK E' VEL: 7.9 CM/SEC
BH CV ECHO MEAS - MR MAX PG: 82.4 MMHG
BH CV ECHO MEAS - MR MAX VEL: 453.9 CM/SEC
BH CV ECHO MEAS - MV A DUR: 0.08 SEC
BH CV ECHO MEAS - MV A MAX VEL: 99.4 CM/SEC
BH CV ECHO MEAS - MV DEC SLOPE: 595.7 CM/SEC2
BH CV ECHO MEAS - MV DEC TIME: 0.2 MSEC
BH CV ECHO MEAS - MV E MAX VEL: 113 CM/SEC
BH CV ECHO MEAS - MV E/A: 1.14
BH CV ECHO MEAS - MV MAX PG: 4.8 MMHG
BH CV ECHO MEAS - MV MEAN PG: 2.16 MMHG
BH CV ECHO MEAS - MV P1/2T: 57.2 MSEC
BH CV ECHO MEAS - MV V2 VTI: 27.3 CM
BH CV ECHO MEAS - MVA(P1/2T): 3.8 CM2
BH CV ECHO MEAS - MVA(VTI): 4.6 CM2
BH CV ECHO MEAS - PA ACC TIME: 0.11 SEC
BH CV ECHO MEAS - PA PR(ACCEL): 27.9 MMHG
BH CV ECHO MEAS - PA V2 MAX: 86.9 CM/SEC
BH CV ECHO MEAS - PULM A REVS DUR: 0.11 SEC
BH CV ECHO MEAS - PULM A REVS VEL: 33.8 CM/SEC
BH CV ECHO MEAS - PULM DIAS VEL: 38.1 CM/SEC
BH CV ECHO MEAS - PULM S/D: 1.31
BH CV ECHO MEAS - PULM SYS VEL: 50.1 CM/SEC
BH CV ECHO MEAS - QP/QS: 0.42
BH CV ECHO MEAS - RAP SYSTOLE: 8 MMHG
BH CV ECHO MEAS - RV MAX PG: 1.74 MMHG
BH CV ECHO MEAS - RV V1 MAX: 66 CM/SEC
BH CV ECHO MEAS - RV V1 VTI: 15.9 CM
BH CV ECHO MEAS - RVOT DIAM: 2.04 CM
BH CV ECHO MEAS - SI(MOD-SP2): 40 ML/M2
BH CV ECHO MEAS - SI(MOD-SP4): 40 ML/M2
BH CV ECHO MEAS - SUP REN AO DIAM: 2.4 CM
BH CV ECHO MEAS - SV(LVOT): 124.4 ML
BH CV ECHO MEAS - SV(MOD-SP2): 85 ML
BH CV ECHO MEAS - SV(MOD-SP4): 85 ML
BH CV ECHO MEAS - SV(RVOT): 51.8 ML
BH CV ECHO MEAS - TAPSE (>1.6): 2.8 CM
BH CV ECHO MEASUREMENTS AVERAGE E/E' RATIO: 12.15
BH CV XLRA - RV BASE: 3.5 CM
BH CV XLRA - RV LENGTH: 7.1 CM
BH CV XLRA - RV MID: 3 CM
BH CV XLRA - TDI S': 17.5 CM/SEC
BH CV XLRA MEAS LEFT DIST CCA EDV: -20.7 CM/SEC
BH CV XLRA MEAS LEFT DIST CCA PSV: -78 CM/SEC
BH CV XLRA MEAS LEFT DIST ICA EDV: -19.9 CM/SEC
BH CV XLRA MEAS LEFT DIST ICA PSV: -74.8 CM/SEC
BH CV XLRA MEAS LEFT ICA/CCA RATIO: 1.7
BH CV XLRA MEAS LEFT MID CCA EDV: -28 CM/SEC
BH CV XLRA MEAS LEFT MID CCA PSV: -80.5 CM/SEC
BH CV XLRA MEAS LEFT MID ICA EDV: -42.7 CM/SEC
BH CV XLRA MEAS LEFT MID ICA PSV: -121.9 CM/SEC
BH CV XLRA MEAS LEFT PROX CCA EDV: 23.2 CM/SEC
BH CV XLRA MEAS LEFT PROX CCA PSV: 91.5 CM/SEC
BH CV XLRA MEAS LEFT PROX ECA PSV: -121.9 CM/SEC
BH CV XLRA MEAS LEFT PROX ICA EDV: 25.6 CM/SEC
BH CV XLRA MEAS LEFT PROX ICA PSV: 129.3 CM/SEC
BH CV XLRA MEAS LEFT PROX SCLA PSV: 203.7 CM/SEC
BH CV XLRA MEAS LEFT VERTEBRAL A PSV: -65.9 CM/SEC
BH CV XLRA MEAS RIGHT DIST CCA EDV: -14.3 CM/SEC
BH CV XLRA MEAS RIGHT DIST CCA PSV: -61.5 CM/SEC
BH CV XLRA MEAS RIGHT DIST ICA EDV: -25 CM/SEC
BH CV XLRA MEAS RIGHT DIST ICA PSV: -112.1 CM/SEC
BH CV XLRA MEAS RIGHT ICA/CCA RATIO: 1.8
BH CV XLRA MEAS RIGHT MID CCA EDV: -9.9 CM/SEC
BH CV XLRA MEAS RIGHT MID CCA PSV: -59.6 CM/SEC
BH CV XLRA MEAS RIGHT MID ICA EDV: -27.3 CM/SEC
BH CV XLRA MEAS RIGHT MID ICA PSV: -91.3 CM/SEC
BH CV XLRA MEAS RIGHT PROX CCA EDV: 9.9 CM/SEC
BH CV XLRA MEAS RIGHT PROX CCA PSV: 68.3 CM/SEC
BH CV XLRA MEAS RIGHT PROX ECA PSV: -100.6 CM/SEC
BH CV XLRA MEAS RIGHT PROX ICA EDV: -19.9 CM/SEC
BH CV XLRA MEAS RIGHT PROX ICA PSV: -62.7 CM/SEC
BH CV XLRA MEAS RIGHT PROX SCLA PSV: 316.2 CM/SEC
BH CV XLRA MEAS RIGHT VERTEBRAL A PSV: -22.3 CM/SEC
LEFT ATRIUM VOLUME INDEX: 36.6 ML/M2
LV EF 2D ECHO EST: 73 %
MAXIMAL PREDICTED HEART RATE: 148 BPM
MAXIMAL PREDICTED HEART RATE: 148 BPM
SINUS: 3.6 CM
STJ: 2.9 CM
STRESS TARGET HR: 126 BPM
STRESS TARGET HR: 126 BPM

## 2022-08-09 PROCEDURE — 25010000002 PERFLUTREN (DEFINITY) 8.476 MG IN SODIUM CHLORIDE (PF) 0.9 % 10 ML INJECTION: Performed by: PHYSICIAN ASSISTANT

## 2022-08-09 PROCEDURE — 93880 EXTRACRANIAL BILAT STUDY: CPT

## 2022-08-09 PROCEDURE — 93880 EXTRACRANIAL BILAT STUDY: CPT | Performed by: INTERNAL MEDICINE

## 2022-08-09 PROCEDURE — 93306 TTE W/DOPPLER COMPLETE: CPT

## 2022-08-09 PROCEDURE — 93306 TTE W/DOPPLER COMPLETE: CPT | Performed by: INTERNAL MEDICINE

## 2022-08-09 RX ADMIN — PERFLUTREN 1.5 ML: 6.52 INJECTION, SUSPENSION INTRAVENOUS at 09:28

## 2022-09-22 NOTE — PROGRESS NOTES
"Chief Complaint  Chief Complaint   Patient presents with   • Diabetes     Type 2: Patient is up to date on eye exam, no hx of retinopathy or neuropathy.        Subjective          History of Present Illness    Reji Morales 72 y.o.  presents for a follow-up evaluation for type 2 DM     Pt is on the following medications for their DM: pioglitazone-Metformin  mg one in am and two in pm and Ozempic 1 mg once a week      Denies diarrhea, constipation, chest pain, shortness of breath, vision changes or numbness and tingling in feet/hands.    Weight loss of 4 lbs since last visit.    Pt does not have a history of DM retinopathy.  Last eye exam was 06/22  Has cataracts     Pt does have a history of nephropathy.  Patient is currently taking ARB     Pt does not have neuropathy.       Pt does not have a history of CAD or CVA.    Last A1C in 06/22 was 6.1    Last microalbumin in 07/22 was negative          Blood Sugars    Blood glucoses are not checked              Hyperlipidemia     Pt denies any muscle/body aches, chest pain, or shortness of breath    Pt is currently taking fenofibrate 145 mg daily, Vascepa 2 gm BID and Crestor 20 mg HS    Last lipid panel in 05/22 showed Total 110, Triglycerides 81, HDL 51 and LDL 43            Hypertension    Pt denies any chest pain, palpitations, shortness of breath, or headache    Current regimen includes irbesartan-HTCZ 150-12.5 mg 1 tablets daily amlodipine 2.5 mg daily          Vitamin D Deficiency    Current treatment includes 50,000 units twice a week    Last Vit D level was 72.7 in 05/22               I have reviewed the patient's allergies, medicines, past medical hx, family hx and social hx.    Objective   Vital Signs:   /60   Pulse 65   Temp 96.4 °F (35.8 °C) (Temporal)   Ht 172.7 cm (68\")   Wt 97.3 kg (214 lb 9.6 oz)   SpO2 99%   BMI 32.63 kg/m²       Physical Exam   Physical Exam  Constitutional:       General: He is not in acute distress.     Appearance: " Normal appearance. He is not diaphoretic.   HENT:      Head: Normocephalic and atraumatic.   Eyes:      General:         Right eye: No discharge.         Left eye: No discharge.   Skin:     General: Skin is warm and dry.   Neurological:      Mental Status: He is alert.   Psychiatric:         Mood and Affect: Mood normal.         Behavior: Behavior normal.                    Results Review:   Hemoglobin A1C   Date Value Ref Range Status   05/20/2022 6.1 (H) 4.8 - 5.6 % Final     Comment:              Prediabetes: 5.7 - 6.4           Diabetes: >6.4           Glycemic control for adults with diabetes: <7.0       Triglycerides   Date Value Ref Range Status   05/20/2022 81 0 - 149 mg/dL Final     HDL Cholesterol   Date Value Ref Range Status   05/20/2022 51 >39 mg/dL Final     LDL Chol Calc (NIH)   Date Value Ref Range Status   05/20/2022 43 0 - 99 mg/dL Final     VLDL Cholesterol Kurt   Date Value Ref Range Status   05/20/2022 16 5 - 40 mg/dL Final         Assessment and Plan {CC Problem List  Visit Diagnosis  ROS  Review (Popup)  Health Maintenance  Quality  BestPractice  Medications  SmartSets  SnapShot Encounters  Media :23  Diagnoses and all orders for this visit:    1. Controlled type 2 diabetes mellitus without complication, without long-term current use of insulin (HCC) (Primary)  -     pioglitazone-metFORMIN (ACTOPLUS MET)  MG per tablet; Take 1 tablet by mouth every morning and then take 2 tablets by mouth every evening.  Dispense: 270 tablet; Refill: 1  -     Hemoglobin A1c  -     Comprehensive Metabolic Panel    Check labs today  Continue with pioglitazone-Metformin  mg one in am and two in pm and Ozempic 1 mg once a week      2. Mixed hyperlipidemia  -     fenofibrate (TRICOR) 145 MG tablet; Take 1 tablet by mouth Daily.  Dispense: 90 tablet; Refill: 1  -     Vascepa 1 g capsule capsule; TAKE FOUR CAPSULES BY MOUTH EVERY DAY  Dispense: 360 capsule; Refill: 1  -     rosuvastatin  (CRESTOR) 20 MG tablet; Take 1 tablet by mouth Daily.  Dispense: 90 tablet; Refill: 1  -     Comprehensive Metabolic Panel  -     Lipid Panel    Check labs today  Continue with fenofibrate, Vascepa and Crestor      3. Essential hypertension  -     Comprehensive Metabolic Panel    Stable  Continue with current medication regimen  Defer management to PCP        4. Vitamin D deficiency  -     vitamin D (ERGOCALCIFEROL) 1.25 MG (86479 UT) capsule capsule; Take 1 capsule by mouth 2 (Two) Times a Week. TAKE AS DIRECTED  Dispense: 24 capsule; Refill: 1    Continue with supplement          Refills sent to pharmacy        Labs today  RTC in 4 months with me and 8 months with Dr. Canchola      Follow Up     Patient was given instructions and counseling regarding her condition or for health maintenance advice. Please see specific information pulled into the AVS if appropriate.              Melissa Park, THAIS  09/23/22

## 2022-09-23 ENCOUNTER — OFFICE VISIT (OUTPATIENT)
Dept: ENDOCRINOLOGY | Age: 72
End: 2022-09-23

## 2022-09-23 VITALS
OXYGEN SATURATION: 99 % | HEART RATE: 65 BPM | TEMPERATURE: 96.4 F | HEIGHT: 68 IN | BODY MASS INDEX: 32.52 KG/M2 | DIASTOLIC BLOOD PRESSURE: 60 MMHG | SYSTOLIC BLOOD PRESSURE: 122 MMHG | WEIGHT: 214.6 LBS

## 2022-09-23 DIAGNOSIS — E55.9 VITAMIN D DEFICIENCY: ICD-10-CM

## 2022-09-23 DIAGNOSIS — E11.9 CONTROLLED TYPE 2 DIABETES MELLITUS WITHOUT COMPLICATION, WITHOUT LONG-TERM CURRENT USE OF INSULIN: Primary | ICD-10-CM

## 2022-09-23 DIAGNOSIS — I10 ESSENTIAL HYPERTENSION: ICD-10-CM

## 2022-09-23 DIAGNOSIS — E78.2 MIXED HYPERLIPIDEMIA: ICD-10-CM

## 2022-09-23 LAB
ALBUMIN SERPL-MCNC: 4.6 G/DL (ref 3.5–5.2)
ALBUMIN/GLOB SERPL: 2.9 G/DL
ALP SERPL-CCNC: 31 U/L (ref 39–117)
ALT SERPL-CCNC: 15 U/L (ref 1–41)
AST SERPL-CCNC: 22 U/L (ref 1–40)
BILIRUB SERPL-MCNC: 0.7 MG/DL (ref 0–1.2)
BUN SERPL-MCNC: 19 MG/DL (ref 8–23)
BUN/CREAT SERPL: 22.6 (ref 7–25)
CALCIUM SERPL-MCNC: 9.4 MG/DL (ref 8.6–10.5)
CHLORIDE SERPL-SCNC: 102 MMOL/L (ref 98–107)
CHOLEST SERPL-MCNC: 100 MG/DL (ref 0–200)
CO2 SERPL-SCNC: 27 MMOL/L (ref 22–29)
CREAT SERPL-MCNC: 0.84 MG/DL (ref 0.76–1.27)
EGFRCR SERPLBLD CKD-EPI 2021: 92.7 ML/MIN/1.73
GLOBULIN SER CALC-MCNC: 1.6 GM/DL
GLUCOSE SERPL-MCNC: 105 MG/DL (ref 65–99)
HBA1C MFR BLD: 5.3 % (ref 4.8–5.6)
HDLC SERPL-MCNC: 52 MG/DL (ref 40–60)
IMP & REVIEW OF LAB RESULTS: NORMAL
LDLC SERPL CALC-MCNC: 33 MG/DL (ref 0–100)
POTASSIUM SERPL-SCNC: 4.2 MMOL/L (ref 3.5–5.2)
PROT SERPL-MCNC: 6.2 G/DL (ref 6–8.5)
SODIUM SERPL-SCNC: 138 MMOL/L (ref 136–145)
TRIGL SERPL-MCNC: 71 MG/DL (ref 0–150)
VLDLC SERPL CALC-MCNC: 15 MG/DL (ref 5–40)

## 2022-09-23 PROCEDURE — 99214 OFFICE O/P EST MOD 30 MIN: CPT | Performed by: NURSE PRACTITIONER

## 2022-09-23 RX ORDER — FENOFIBRATE 145 MG/1
145 TABLET, COATED ORAL DAILY
Qty: 90 TABLET | Refills: 1 | Status: SHIPPED | OUTPATIENT
Start: 2022-09-23 | End: 2023-09-23

## 2022-09-23 RX ORDER — ERGOCALCIFEROL 1.25 MG/1
50000 CAPSULE ORAL 2 TIMES WEEKLY
Qty: 24 CAPSULE | Refills: 1 | Status: SHIPPED | OUTPATIENT
Start: 2022-09-26

## 2022-09-23 RX ORDER — ROSUVASTATIN CALCIUM 20 MG/1
20 TABLET, COATED ORAL DAILY
Qty: 90 TABLET | Refills: 1 | Status: SHIPPED | OUTPATIENT
Start: 2022-09-23 | End: 2023-04-04 | Stop reason: SDUPTHER

## 2022-09-23 RX ORDER — ICOSAPENT ETHYL 1000 MG/1
CAPSULE ORAL
Qty: 360 CAPSULE | Refills: 1 | Status: SHIPPED | OUTPATIENT
Start: 2022-09-23 | End: 2023-04-04 | Stop reason: SDUPTHER

## 2022-09-23 RX ORDER — PIOGLITAZONE HCL AND METFORMIN HCL 500; 15 MG/1; MG/1
TABLET ORAL
Qty: 270 TABLET | Refills: 1 | Status: SHIPPED | OUTPATIENT
Start: 2022-09-23 | End: 2023-01-27 | Stop reason: SDUPTHER

## 2022-10-06 ENCOUNTER — OFFICE VISIT (OUTPATIENT)
Dept: CARDIOLOGY | Facility: CLINIC | Age: 72
End: 2022-10-06

## 2022-10-06 VITALS
BODY MASS INDEX: 32.61 KG/M2 | DIASTOLIC BLOOD PRESSURE: 72 MMHG | SYSTOLIC BLOOD PRESSURE: 130 MMHG | WEIGHT: 215.2 LBS | HEIGHT: 68 IN | HEART RATE: 71 BPM | OXYGEN SATURATION: 98 %

## 2022-10-06 DIAGNOSIS — I35.0 NONRHEUMATIC AORTIC VALVE STENOSIS: Primary | ICD-10-CM

## 2022-10-06 DIAGNOSIS — I10 ESSENTIAL HYPERTENSION: ICD-10-CM

## 2022-10-06 PROCEDURE — 99204 OFFICE O/P NEW MOD 45 MIN: CPT | Performed by: INTERNAL MEDICINE

## 2022-10-06 NOTE — PROGRESS NOTES
"      CARDIOLOGY    Devan Hand MD    ENCOUNTER DATE:  10/06/2022    Reji Morales / 72 y.o. / male        CHIEF COMPLAINT / REASON FOR OFFICE VISIT     Nonrheumatic aortic valve stenosis (New patient )  Hypertension    HISTORY OF PRESENT ILLNESS       HPI  Reji Morales is a 72 y.o. male who presents today for consultation.  Patient has a history of diabetes hypertension and hyperlipidemia.  Patient was found to have a murmur he underwent an echocardiogram and was found to have aortic stenosis.  He was referred for further evaluation.  He denies any types of cardiac symptoms.  No chest pain shortness of breath palpitations lightheadedness or fatigue.      The following portions of the patient's history were reviewed and updated as appropriate: allergies, current medications, past family history, past medical history, past social history, past surgical history and problem list.      VITAL SIGNS     Visit Vitals  /72 (BP Location: Left arm, Patient Position: Sitting, Cuff Size: Adult)   Pulse 71   Ht 172.7 cm (68\")   Wt 97.6 kg (215 lb 3.2 oz)   SpO2 98%   BMI 32.72 kg/m²         Wt Readings from Last 3 Encounters:   10/06/22 97.6 kg (215 lb 3.2 oz)   09/23/22 97.3 kg (214 lb 9.6 oz)   08/09/22 99.3 kg (219 lb)     Body mass index is 32.72 kg/m².      REVIEW OF SYSTEMS   ROS        PHYSICAL EXAMINATION     Vitals reviewed.   Constitutional:       Appearance: Healthy appearance.   Pulmonary:      Effort: Pulmonary effort is normal.   Cardiovascular:      Normal rate. Regular rhythm. Normal S1. Normal S2.      Murmurs: There is a grade 2/6 harsh midsystolic murmur at the URSB.      No gallop. No click. No rub.   Pulses:     Intact distal pulses.   Edema:     Peripheral edema absent.   Neurological:      Mental Status: Alert and oriented to person, place and time.           REVIEWED DATA     Procedures    Cardiac Procedures:  Interpretation Summary  8/9/22    · Calculated left ventricular EF = 73.4% Estimated " left ventricular EF = 73% Estimated left ventricular EF was in agreement with the calculated left ventricular EF. Left ventricular systolic function is hyperdynamic (EF > 70%). Normal left ventricular cavity size and wall thickness noted. All left ventricular wall segments contract normally. Left ventricular diastolic function was normal.  · Lipomatous hypertrophy of the interatrial septum present.  · There is severe calcification of the aortic valve. No aortic valve regurgitation is present. Mild to moderate aortic valve stenosis is present. Aortic valve area is 1.4 cm2. Aortic valve mean pressure gradient is 24.2 mmHg.  · Mild mitral annular calcification is present. Trace mitral valve regurgitation is present.  · Trace tricuspid valve regurgitation is present. Insufficient TR velocity profile to estimate the right ventricular systolic pressure.     1.     Lipid Panel    Lipid Panel 11/5/21 5/20/22 9/23/22   Total Cholesterol 125 110 100   Triglycerides 50 81 71   HDL Cholesterol 59 51 52   VLDL Cholesterol 12 16 15   LDL Cholesterol  54 43 33      Comments are available for some flowsheets but are not being displayed.               ASSESSMENT & PLAN      Diagnosis Plan   1. Nonrheumatic aortic valve stenosis     2. Essential hypertension           SUMMARY/DISCUSSION  1. Aortic stenosis.  Patient mild to moderate aortic stenosis is completely asymptomatic.  2. Hypertension blood pressures great  3. I would reevaluate him in a year.  I probably would not repeat his echo for 2 years but we will see how he is doing next year and see if his murmur is changed.  4. Thank you for giving me the opportunity to care for this very pleasant patient.        MEDICATIONS         Discharge Medications          Accurate as of October 6, 2022 11:03 AM. If you have any questions, ask your nurse or doctor.            Continue These Medications      Instructions Start Date   amLODIPine 2.5 MG tablet  Commonly known as: NORVASC   2.5  mg, Oral, Daily, For BP with  irbesartan 150 mg daily and stop irbesartan HCT      aspirin 81 MG disintegrating tablet   Oral      fenofibrate 145 MG tablet  Commonly known as: TRICOR   145 mg, Oral, Daily      irbesartan 150 MG tablet  Commonly known as: Avapro   150 mg, Oral, Daily, For BP and stop Irbesartan HCT      Ozempic (1 MG/DOSE) 4 MG/3ML solution pen-injector  Generic drug: Semaglutide (1 MG/DOSE)   1 mg, Subcutaneous, Every 7 Days      pioglitazone-metFORMIN  MG per tablet  Commonly known as: ACTOPLUS MET   Take 1 tablet by mouth every morning and then take 2 tablets by mouth every evening.       rosuvastatin 20 MG tablet  Commonly known as: CRESTOR   20 mg, Oral, Daily      Vascepa 1 g capsule capsule  Generic drug: icosapent ethyl   TAKE FOUR CAPSULES BY MOUTH EVERY DAY      vitamin b complex tablet tablet   Oral      vitamin D 1.25 MG (74795 UT) capsule capsule  Commonly known as: ERGOCALCIFEROL   50,000 Units, Oral, 2 Times Weekly, TAKE AS DIRECTED                 **Dragon Disclaimer:   Much of this encounter note is an electronic transcription/translation of spoken language to printed text. The electronic translation of spoken language may permit erroneous, or at times, nonsensical words or phrases to be inadvertently transcribed. Although I have reviewed the note for such errors, some may still exist.

## 2022-12-07 DIAGNOSIS — E11.9 CONTROLLED TYPE 2 DIABETES MELLITUS WITHOUT COMPLICATION, WITHOUT LONG-TERM CURRENT USE OF INSULIN: ICD-10-CM

## 2022-12-08 RX ORDER — SEMAGLUTIDE 1.34 MG/ML
INJECTION, SOLUTION SUBCUTANEOUS
Qty: 9 ML | Refills: 1 | Status: SHIPPED | OUTPATIENT
Start: 2022-12-08

## 2022-12-12 RX ORDER — IRBESARTAN 150 MG/1
TABLET ORAL
Qty: 30 TABLET | Refills: 0 | Status: SHIPPED | OUTPATIENT
Start: 2022-12-12 | End: 2023-01-20

## 2022-12-12 RX ORDER — AMLODIPINE BESYLATE 2.5 MG/1
TABLET ORAL
Qty: 30 TABLET | Refills: 0 | Status: SHIPPED | OUTPATIENT
Start: 2022-12-12 | End: 2023-01-20

## 2022-12-12 NOTE — TELEPHONE ENCOUNTER
Rx Refill Note  Requested Prescriptions     Pending Prescriptions Disp Refills   • amLODIPine (NORVASC) 2.5 MG tablet [Pharmacy Med Name: amlodipine 2.5 mg tablet] 90 tablet 1     Sig: TAKE ONE TABLET BY MOUTH EVERY DAY FOR BLOOD PRESSURE   • irbesartan (AVAPRO) 150 MG tablet [Pharmacy Med Name: irbesartan 150 mg tablet] 90 tablet 1     Sig: TAKE ONE TABLET BY MOUTH EVERY DAY FOR BLOOD PRESSURE      Last office visit with prescribing clinician: 7/19/2022   Last telemedicine visit with prescribing clinician: Visit date not found   Next office visit with prescribing clinician: Visit date not found                         Would you like a call back once the refill request has been completed: [] Yes [] No    If the office needs to give you a call back, can they leave a voicemail: [] Yes [] No    Bakari Troncoso MA  12/12/22, 13:39 EST      Sent a 30 day RX. Pt is needing an appointment.     Okay for the HUB to relay message

## 2023-01-20 RX ORDER — IRBESARTAN 150 MG/1
TABLET ORAL
Qty: 30 TABLET | Refills: 0 | Status: SHIPPED | OUTPATIENT
Start: 2023-01-20 | End: 2023-03-07

## 2023-01-20 RX ORDER — AMLODIPINE BESYLATE 2.5 MG/1
TABLET ORAL
Qty: 30 TABLET | Refills: 0 | Status: SHIPPED | OUTPATIENT
Start: 2023-01-20 | End: 2023-03-07

## 2023-01-27 ENCOUNTER — OFFICE VISIT (OUTPATIENT)
Dept: ENDOCRINOLOGY | Age: 73
End: 2023-01-27
Payer: MEDICARE

## 2023-01-27 VITALS
TEMPERATURE: 97.7 F | BODY MASS INDEX: 33.49 KG/M2 | HEIGHT: 68 IN | OXYGEN SATURATION: 98 % | SYSTOLIC BLOOD PRESSURE: 136 MMHG | WEIGHT: 221 LBS | HEART RATE: 75 BPM | DIASTOLIC BLOOD PRESSURE: 62 MMHG

## 2023-01-27 DIAGNOSIS — E55.9 VITAMIN D DEFICIENCY: ICD-10-CM

## 2023-01-27 DIAGNOSIS — E11.9 CONTROLLED TYPE 2 DIABETES MELLITUS WITHOUT COMPLICATION, WITHOUT LONG-TERM CURRENT USE OF INSULIN: Primary | ICD-10-CM

## 2023-01-27 DIAGNOSIS — E78.2 MIXED HYPERLIPIDEMIA: ICD-10-CM

## 2023-01-27 DIAGNOSIS — I10 ESSENTIAL HYPERTENSION: ICD-10-CM

## 2023-01-27 LAB
25(OH)D3+25(OH)D2 SERPL-MCNC: 55.6 NG/ML (ref 30–100)
ALBUMIN SERPL-MCNC: 4.5 G/DL (ref 3.5–5.2)
ALBUMIN/GLOB SERPL: 2.1 G/DL
ALP SERPL-CCNC: 35 U/L (ref 39–117)
ALT SERPL-CCNC: 15 U/L (ref 1–41)
AST SERPL-CCNC: 21 U/L (ref 1–40)
BILIRUB SERPL-MCNC: 0.8 MG/DL (ref 0–1.2)
BUN SERPL-MCNC: 17 MG/DL (ref 8–23)
BUN/CREAT SERPL: 18.9 (ref 7–25)
CALCIUM SERPL-MCNC: 9.4 MG/DL (ref 8.6–10.5)
CHLORIDE SERPL-SCNC: 101 MMOL/L (ref 98–107)
CHOLEST SERPL-MCNC: 109 MG/DL (ref 0–200)
CO2 SERPL-SCNC: 26.7 MMOL/L (ref 22–29)
CREAT SERPL-MCNC: 0.9 MG/DL (ref 0.76–1.27)
EGFRCR SERPLBLD CKD-EPI 2021: 90.7 ML/MIN/1.73
GLOBULIN SER CALC-MCNC: 2.1 GM/DL
GLUCOSE SERPL-MCNC: 100 MG/DL (ref 65–99)
HBA1C MFR BLD: 5.6 % (ref 4.8–5.6)
HDLC SERPL-MCNC: 60 MG/DL (ref 40–60)
IMP & REVIEW OF LAB RESULTS: NORMAL
LDLC SERPL CALC-MCNC: 36 MG/DL (ref 0–100)
POTASSIUM SERPL-SCNC: 4.6 MMOL/L (ref 3.5–5.2)
PROT SERPL-MCNC: 6.6 G/DL (ref 6–8.5)
SODIUM SERPL-SCNC: 137 MMOL/L (ref 136–145)
TRIGL SERPL-MCNC: 58 MG/DL (ref 0–150)
VLDLC SERPL CALC-MCNC: 13 MG/DL (ref 5–40)

## 2023-01-27 PROCEDURE — 99214 OFFICE O/P EST MOD 30 MIN: CPT | Performed by: NURSE PRACTITIONER

## 2023-01-27 RX ORDER — PIOGLITAZONE HCL AND METFORMIN HCL 500; 15 MG/1; MG/1
TABLET ORAL
Qty: 270 TABLET | Refills: 1 | Status: SHIPPED | OUTPATIENT
Start: 2023-01-27

## 2023-01-27 NOTE — PROGRESS NOTES
"Chief Complaint  Chief Complaint   Patient presents with   • Diabetes     Type 2: Pt doesn't use meter, is up to date on eye exam, no hx of retinopathy or neuropathy.        Subjective          History of Present Illness    Reji Morales 72 y.o. presents for a follow-up evaluation for type 2 DM     Pt is on the following medications for their DM: pioglitazone-Metformin  mg one in am and two in pm and Ozempic 1 mg weekly      Denies diarrhea, constipation, chest pain, shortness of breath, vision changes or numbness and tingling in feet/hands.    Weight gain of 7 lbs since last visit.    Pt does not have a history of DM retinopathy.  Last eye exam was 06/22  Has cataracts     Pt does not have a history of nephropathy.  Patient is currently taking ARB     Pt does not have neuropathy.       Pt does not have a history of CAD or CVA.    Last A1C in 09/22 was 5.3    Last microalbumin in 07/22 was negative          Blood Sugars    Blood glucoses are not checked           Hyperlipidemia     Pt denies any muscle/body aches, chest pain, or shortness of breath    Pt is currently taking fenofibrate 145 mg daily, Vascepa 2 gm BID and Crestor 20 mg HS    Last lipid panel in 09/22 showed Total 100, Triglycerides 71, HDL 52 and LDL 33            Hypertension    Pt denies any chest pain, palpitations, shortness of breath, or headache    Current regimen includes irbesartan-HTCZ 150-12.5 mg 1 tablets daily amlodipine 2.5 mg daily              Vitamin D Deficiency    Current treatment includes 50,000 units twice a week    Last Vit D level was 72.7 in 05/22              I have reviewed the patient's allergies, medicines, past medical hx, family hx and social hx.    Objective   Vital Signs:   /62   Pulse 75   Temp 97.7 °F (36.5 °C) (Temporal)   Ht 172.7 cm (67.99\")   Wt 100 kg (221 lb)   SpO2 98%   BMI 33.61 kg/m²       Physical Exam   Physical Exam  Constitutional:       General: He is not in acute distress.     " Appearance: Normal appearance. He is not diaphoretic.   HENT:      Head: Normocephalic and atraumatic.   Eyes:      General:         Right eye: No discharge.         Left eye: No discharge.   Skin:     General: Skin is warm and dry.   Neurological:      Mental Status: He is alert.   Psychiatric:         Mood and Affect: Mood normal.         Behavior: Behavior normal.                    Results Review:   Hemoglobin A1C   Date Value Ref Range Status   09/23/2022 5.30 4.80 - 5.60 % Final     Comment:     Hemoglobin A1C Ranges:  Increased Risk for Diabetes  5.7% to 6.4%  Diabetes                     >= 6.5%  Diabetic Goal                < 7.0%       Triglycerides   Date Value Ref Range Status   09/23/2022 71 0 - 150 mg/dL Final     HDL Cholesterol   Date Value Ref Range Status   09/23/2022 52 40 - 60 mg/dL Final     LDL Chol Calc ([facility])   Date Value Ref Range Status   09/23/2022 33 0 - 100 mg/dL Final     VLDL Cholesterol Kurt   Date Value Ref Range Status   09/23/2022 15 5 - 40 mg/dL Final         Assessment and Plan {CC Problem List  Visit Diagnosis  ROS  Review (Popup)  Health Maintenance  Quality  BestPractice  Medications  SmartSets  SnapShot Encounters  Media :23  Diagnoses and all orders for this visit:    1. Controlled type 2 diabetes mellitus without complication, without long-term current use of insulin (HCC) (Primary)  -     pioglitazone-metFORMIN (ACTOPLUS MET)  MG per tablet; Take 1 tablet by mouth every morning and then take 2 tablets by mouth every evening.  Dispense: 270 tablet; Refill: 1  -     Hemoglobin A1c  -     Comprehensive Metabolic Panel    Check labs today  Continue with pioglitazone-Metformin  mg one in am and two in pm and Ozempic 1 mg weekly        2. Mixed hyperlipidemia  -     Comprehensive Metabolic Panel  -     Lipid Panel    Check labs today  Continue with fenofibrate 145 mg daily, Vascepa 2 gm BID and Crestor 20 mg HS      3. Essential hypertension  -      Comprehensive Metabolic Panel    Stable  Continue with current medication regimen  Defer management to PCP      4. Vitamin D deficiency  -     Vitamin D,25-Hydroxy    Check labs today  Continue with supplement            Refills sent to pharmacy      Labs today  RTC on 05/26/23 with Dr. Canchola      Follow Up     Patient was given instructions and counseling regarding her condition or for health maintenance advice. Please see specific information pulled into the AVS if appropriate.              Melissa Park, THAIS  01/27/23

## 2023-03-07 RX ORDER — IRBESARTAN 150 MG/1
TABLET ORAL
Qty: 10 TABLET | Refills: 0 | Status: SHIPPED | OUTPATIENT
Start: 2023-03-07 | End: 2023-03-10 | Stop reason: SDUPTHER

## 2023-03-07 RX ORDER — AMLODIPINE BESYLATE 2.5 MG/1
TABLET ORAL
Qty: 10 TABLET | Refills: 0 | Status: SHIPPED | OUTPATIENT
Start: 2023-03-07 | End: 2023-03-10 | Stop reason: SDUPTHER

## 2023-03-10 ENCOUNTER — TELEPHONE (OUTPATIENT)
Dept: FAMILY MEDICINE CLINIC | Facility: CLINIC | Age: 73
End: 2023-03-10
Payer: MEDICARE

## 2023-03-10 RX ORDER — IRBESARTAN 150 MG/1
150 TABLET ORAL DAILY
Qty: 30 TABLET | Refills: 0 | Status: SHIPPED | OUTPATIENT
Start: 2023-03-10 | End: 2023-04-04 | Stop reason: SDUPTHER

## 2023-03-10 RX ORDER — AMLODIPINE BESYLATE 2.5 MG/1
2.5 TABLET ORAL DAILY
Qty: 30 TABLET | Refills: 0 | Status: SHIPPED | OUTPATIENT
Start: 2023-03-10 | End: 2023-04-04 | Stop reason: SDUPTHER

## 2023-03-10 NOTE — TELEPHONE ENCOUNTER
Caller: Holly Goodman    Relationship to patient: Emergency Contact    Best call back number:     Chief complaint: PATIENT IS NEEDING TO GET SCHEDULED FOR A MEDICATION REVIEW/ BP CHECK/ HOWEVER NONE OF THE APPOINTMENTS AVAILABLE WILL WORK WITH HIS SCHEDULE. COULD HE SEE ANOTHER PROVIDER OR COULD JAVIER PATE REFILL MEDICATIONS FOR ANOTHER MONTH?     Type of visit: OFFICE VISIT    Requested date: ANY DAY AS LONG AS IT IS MID-LATE MORNING     If rescheduling, when is the original appointment: N/A    Additional notes:

## 2023-03-22 ENCOUNTER — EXTERNAL PBMM DATA (OUTPATIENT)
Dept: PHARMACY | Facility: OTHER | Age: 73
End: 2023-03-22
Payer: MEDICARE

## 2023-04-04 ENCOUNTER — OFFICE VISIT (OUTPATIENT)
Dept: FAMILY MEDICINE CLINIC | Facility: CLINIC | Age: 73
End: 2023-04-04
Payer: MEDICARE

## 2023-04-04 VITALS
WEIGHT: 225 LBS | HEART RATE: 73 BPM | TEMPERATURE: 97.5 F | OXYGEN SATURATION: 99 % | HEIGHT: 68 IN | DIASTOLIC BLOOD PRESSURE: 82 MMHG | RESPIRATION RATE: 16 BRPM | BODY MASS INDEX: 34.1 KG/M2 | SYSTOLIC BLOOD PRESSURE: 130 MMHG

## 2023-04-04 DIAGNOSIS — R79.0 LOW IRON STORES: ICD-10-CM

## 2023-04-04 DIAGNOSIS — I65.22 STENOSIS OF LEFT CAROTID ARTERY: ICD-10-CM

## 2023-04-04 DIAGNOSIS — E55.9 VITAMIN D DEFICIENCY: ICD-10-CM

## 2023-04-04 DIAGNOSIS — E78.2 MIXED HYPERLIPIDEMIA: ICD-10-CM

## 2023-04-04 DIAGNOSIS — I35.0 NONRHEUMATIC AORTIC VALVE STENOSIS: ICD-10-CM

## 2023-04-04 DIAGNOSIS — I10 ESSENTIAL HYPERTENSION: Primary | ICD-10-CM

## 2023-04-04 RX ORDER — IRBESARTAN 150 MG/1
150 TABLET ORAL DAILY
Qty: 90 TABLET | Refills: 1 | Status: SHIPPED | OUTPATIENT
Start: 2023-04-04

## 2023-04-04 RX ORDER — AMLODIPINE BESYLATE 2.5 MG/1
2.5 TABLET ORAL DAILY
Qty: 90 TABLET | Refills: 1 | Status: SHIPPED | OUTPATIENT
Start: 2023-04-04

## 2023-04-04 RX ORDER — ICOSAPENT ETHYL 1000 MG/1
CAPSULE ORAL
Qty: 360 CAPSULE | Refills: 1 | Status: SHIPPED | OUTPATIENT
Start: 2023-04-04

## 2023-04-04 RX ORDER — ROSUVASTATIN CALCIUM 20 MG/1
20 TABLET, COATED ORAL DAILY
Qty: 90 TABLET | Refills: 1 | Status: SHIPPED | OUTPATIENT
Start: 2023-04-04

## 2023-04-04 NOTE — PROGRESS NOTES
Immunization  Immunization performed in Left Deltoid by Nancy Rock. Patient tolerated the procedure well without complications.  04/04/23   Nancy Rock

## 2023-04-04 NOTE — PROGRESS NOTES
"Kalie Morales is a 73 y.o. male.     History of Present Illness    Since the last visit, he has overall felt well.  He has Primary Hypertension and well controlled on current medication, DMII managed by Endocrinologist, Hyperlipidemia with goals met with current Rx and Vitamin D deficiency and labs are at goal >30 ng/mL.  he has been compliant with current medications have reviewed them.  The patient denies medication side effects.  Will refill medications. /58   Pulse 73   Temp 97.5 °F (36.4 °C)   Resp 16   Ht 172.7 cm (67.99\")   Wt 102 kg (225 lb)   SpO2 99%   BMI 34.22 kg/m²     Results for orders placed or performed in visit on 01/27/23   Hemoglobin A1c    Specimen: Blood   Result Value Ref Range    Hemoglobin A1C 5.60 4.80 - 5.60 %   Comprehensive Metabolic Panel    Specimen: Blood   Result Value Ref Range    Glucose 100 (H) 65 - 99 mg/dL    BUN 17 8 - 23 mg/dL    Creatinine 0.90 0.76 - 1.27 mg/dL    EGFR Result 90.7 >60.0 mL/min/1.73    BUN/Creatinine Ratio 18.9 7.0 - 25.0    Sodium 137 136 - 145 mmol/L    Potassium 4.6 3.5 - 5.2 mmol/L    Chloride 101 98 - 107 mmol/L    Total CO2 26.7 22.0 - 29.0 mmol/L    Calcium 9.4 8.6 - 10.5 mg/dL    Total Protein 6.6 6.0 - 8.5 g/dL    Albumin 4.5 3.5 - 5.2 g/dL    Globulin 2.1 gm/dL    A/G Ratio 2.1 g/dL    Total Bilirubin 0.8 0.0 - 1.2 mg/dL    Alkaline Phosphatase 35 (L) 39 - 117 U/L    AST (SGOT) 21 1 - 40 U/L    ALT (SGPT) 15 1 - 41 U/L   Lipid Panel    Specimen: Blood   Result Value Ref Range    Total Cholesterol 109 0 - 200 mg/dL    Triglycerides 58 0 - 150 mg/dL    HDL Cholesterol 60 40 - 60 mg/dL    VLDL Cholesterol Kurt 13 5 - 40 mg/dL    LDL Chol Calc ([facility]) 36 0 - 100 mg/dL   Vitamin D,25-Hydroxy    Specimen: Blood   Result Value Ref Range    25 Hydroxy, Vitamin D 55.6 30.0 - 100.0 ng/ml   Cardiovascular Risk Assessment   Result Value Ref Range    Interpretation Note          Need records from 7/22/2019 that patient had been to urology " Dr. Hampton noted nodular prostate and due to age follow-up as needed.    Patient did follow-up with labs and note his potassium level was 5.1 on 5/25/2022.  Endocrinology noted his hyponatremia and did order additional labs to include ACTH, adrenal 21-hydroxylase autoantibodies, cortisol.  Seen with  reviewed his results on 6/5/2022 noted that his serum sodium was stable with normal ACTH, cortisol and 21 hydroxylase antibody.  Low serum sodium may be due to hydrochlorothiazide and suggest irbesartan hydrochlorothiazide and take off HCTZ.  Also to manage his hypertension.    Carotid artery===bruit  Interpretation Summary    • Proximal right internal carotid artery plaque without significant stenosis.  • Proximal left internal carotid artery mild stenosis.  • Mid left internal carotid artery mild stenosis.  I did have him see Dr. Hand cardiology after hearing the heart murmur and the carotid bruit--- noting he did have mild stenosis carotid artery left and have follow-up Doppler for August ordered--- also making sure his LDL is less than 70 and this was noted on January labs  After the echo to evaluate heart murmur I had him see cardiology and this was on 10/6/2020 to Dr. Hand  SUMMARY/DISCUSSION  1. Aortic stenosis.  Patient mild to moderate aortic stenosis is completely asymptomatic.  2. Hypertension blood pressures great  3. I would reevaluate him in a year.  I probably would not repeat his echo for 2 years but we will see how he is doing next year and see if his murmur is changed.  4. Thank you for giving me the opportunity to care for this very pleasant patient.  5.   The following portions of the patient's history were reviewed and updated as appropriate: allergies, current medications, past family history, past medical history, past social history, past surgical history and problem list.    Review of Systems   Constitutional: Negative for fever.   HENT: Negative for nosebleeds and trouble  swallowing.    Eyes: Negative for visual disturbance.   Respiratory: Negative for choking and stridor.    Cardiovascular: Negative for chest pain.   Gastrointestinal: Negative for blood in stool.   Endocrine: Negative for polydipsia.   Genitourinary: Negative for genital sores and hematuria.   Musculoskeletal: Negative for joint swelling.   Skin: Negative for color change and rash.   Allergic/Immunologic: Negative for immunocompromised state.   Neurological: Negative for seizures, facial asymmetry and speech difficulty.   Hematological: Negative for adenopathy.   Psychiatric/Behavioral: Negative for behavioral problems, self-injury and suicidal ideas.       Objective   Physical Exam  Vitals and nursing note reviewed.   Constitutional:       General: He is not in acute distress.     Appearance: He is well-developed. He is obese. He is not diaphoretic.   HENT:      Head: Normocephalic.      Right Ear: External ear normal.      Left Ear: External ear normal.   Eyes:      General: No scleral icterus.     Conjunctiva/sclera: Conjunctivae normal.      Pupils: Pupils are equal, round, and reactive to light.   Neck:      Vascular: Carotid bruit present.      Comments: L>R bruit  Cardiovascular:      Rate and Rhythm: Normal rate and regular rhythm.      Pulses: Normal pulses.      Heart sounds: Murmur heard.   Pulmonary:      Effort: Pulmonary effort is normal.      Breath sounds: Normal breath sounds. No rales.   Abdominal:      Palpations: Abdomen is soft.      Tenderness: There is no abdominal tenderness.   Musculoskeletal:         General: Normal range of motion.      Cervical back: Normal range of motion and neck supple.      Right lower leg: Edema present.      Left lower leg: Edema present.      Comments: Trace pedal edema = bilat     Skin:     General: Skin is warm and dry.      Coloration: Skin is not jaundiced.      Findings: No rash.   Neurological:      General: No focal deficit present.      Mental Status: He is  alert and oriented to person, place, and time.   Psychiatric:         Mood and Affect: Mood normal. Affect is not inappropriate.         Behavior: Behavior normal.         Thought Content: Thought content normal.         Judgment: Judgment normal.         Assessment & Plan   Diagnoses and all orders for this visit:    1. Essential hypertension (Primary)    2. Mixed hyperlipidemia    3. Vitamin D deficiency    4. Nonrheumatic aortic valve stenosis    5. Stenosis of left carotid artery    Other orders  -     Pneumococcal Conjugate Vaccine 20-Valent All      Plan, Reji Morales, was seen today.  he was seen for HTN and continue medication, DMII followed by Endocrinologist, Hyperlipidemia and will continue current medication and Vitamin D deficiency and will update labs .  Need Prevnar 20  Need update PSA in July  On iron daily --need f/u labs  To see cardio yearly--mod aortic stenosis---no SOA

## 2023-04-05 LAB
BASOPHILS # BLD AUTO: 0.03 10*3/MM3 (ref 0–0.2)
BASOPHILS NFR BLD AUTO: 0.4 % (ref 0–1.5)
EOSINOPHIL # BLD AUTO: 0.15 10*3/MM3 (ref 0–0.4)
EOSINOPHIL NFR BLD AUTO: 1.9 % (ref 0.3–6.2)
ERYTHROCYTE [DISTWIDTH] IN BLOOD BY AUTOMATED COUNT: 12.8 % (ref 12.3–15.4)
FERRITIN SERPL-MCNC: 109 NG/ML (ref 30–400)
FOLATE SERPL-MCNC: >20 NG/ML (ref 4.78–24.2)
HCT VFR BLD AUTO: 40.7 % (ref 37.5–51)
HGB BLD-MCNC: 13.9 G/DL (ref 13–17.7)
IMM GRANULOCYTES # BLD AUTO: 0.02 10*3/MM3 (ref 0–0.05)
IMM GRANULOCYTES NFR BLD AUTO: 0.3 % (ref 0–0.5)
IRON SATN MFR SERPL: 24 % (ref 20–50)
IRON SERPL-MCNC: 139 MCG/DL (ref 59–158)
LYMPHOCYTES # BLD AUTO: 1.11 10*3/MM3 (ref 0.7–3.1)
LYMPHOCYTES NFR BLD AUTO: 14.3 % (ref 19.6–45.3)
MCH RBC QN AUTO: 32.5 PG (ref 26.6–33)
MCHC RBC AUTO-ENTMCNC: 34.2 G/DL (ref 31.5–35.7)
MCV RBC AUTO: 95.1 FL (ref 79–97)
MONOCYTES # BLD AUTO: 0.54 10*3/MM3 (ref 0.1–0.9)
MONOCYTES NFR BLD AUTO: 6.9 % (ref 5–12)
NEUTROPHILS # BLD AUTO: 5.93 10*3/MM3 (ref 1.7–7)
NEUTROPHILS NFR BLD AUTO: 76.2 % (ref 42.7–76)
NRBC BLD AUTO-RTO: 0 /100 WBC (ref 0–0.2)
PLATELET # BLD AUTO: 267 10*3/MM3 (ref 140–450)
RBC # BLD AUTO: 4.28 10*6/MM3 (ref 4.14–5.8)
TIBC SERPL-MCNC: 568 MCG/DL
UIBC SERPL-MCNC: 429 MCG/DL (ref 112–346)
VIT B12 SERPL-MCNC: 593 PG/ML (ref 211–946)
WBC # BLD AUTO: 7.78 10*3/MM3 (ref 3.4–10.8)

## 2023-04-27 DIAGNOSIS — E55.9 VITAMIN D DEFICIENCY: ICD-10-CM

## 2023-04-28 RX ORDER — ERGOCALCIFEROL 1.25 MG/1
CAPSULE ORAL
Qty: 24 CAPSULE | Refills: 1 | Status: SHIPPED | OUTPATIENT
Start: 2023-04-28

## 2023-05-22 DIAGNOSIS — E78.2 MIXED HYPERLIPIDEMIA: ICD-10-CM

## 2023-05-23 DIAGNOSIS — E11.9 CONTROLLED TYPE 2 DIABETES MELLITUS WITHOUT COMPLICATION, WITHOUT LONG-TERM CURRENT USE OF INSULIN: ICD-10-CM

## 2023-05-23 RX ORDER — SEMAGLUTIDE 1.34 MG/ML
1 INJECTION, SOLUTION SUBCUTANEOUS
Qty: 9 ML | Refills: 1 | Status: SHIPPED | OUTPATIENT
Start: 2023-05-23 | End: 2023-05-28 | Stop reason: DRUGHIGH

## 2023-05-23 RX ORDER — FENOFIBRATE 145 MG/1
TABLET, COATED ORAL
Qty: 90 TABLET | Refills: 1 | Status: SHIPPED | OUTPATIENT
Start: 2023-05-23

## 2023-05-26 ENCOUNTER — OFFICE VISIT (OUTPATIENT)
Dept: ENDOCRINOLOGY | Age: 73
End: 2023-05-26
Payer: MEDICARE

## 2023-05-26 VITALS
OXYGEN SATURATION: 98 % | BODY MASS INDEX: 35.01 KG/M2 | SYSTOLIC BLOOD PRESSURE: 128 MMHG | WEIGHT: 231 LBS | HEIGHT: 68 IN | DIASTOLIC BLOOD PRESSURE: 84 MMHG | HEART RATE: 66 BPM

## 2023-05-26 DIAGNOSIS — I10 ESSENTIAL HYPERTENSION: ICD-10-CM

## 2023-05-26 DIAGNOSIS — I35.0 NONRHEUMATIC AORTIC VALVE STENOSIS: ICD-10-CM

## 2023-05-26 DIAGNOSIS — E11.9 CONTROLLED TYPE 2 DIABETES MELLITUS WITHOUT COMPLICATION, WITHOUT LONG-TERM CURRENT USE OF INSULIN: ICD-10-CM

## 2023-05-26 DIAGNOSIS — E78.2 MIXED HYPERLIPIDEMIA: ICD-10-CM

## 2023-05-26 DIAGNOSIS — E11.9 TYPE 2 DIABETES MELLITUS WITHOUT COMPLICATION, WITHOUT LONG-TERM CURRENT USE OF INSULIN: Primary | ICD-10-CM

## 2023-05-26 DIAGNOSIS — E55.9 VITAMIN D DEFICIENCY: ICD-10-CM

## 2023-05-26 DIAGNOSIS — I65.23 BILATERAL CAROTID ARTERY STENOSIS: ICD-10-CM

## 2023-05-26 RX ORDER — PNV NO.95/FERROUS FUM/FOLIC AC 28MG-0.8MG
1 TABLET ORAL
COMMUNITY

## 2023-05-26 RX ORDER — PIOGLITAZONE HCL AND METFORMIN HCL 500; 15 MG/1; MG/1
TABLET ORAL
Qty: 270 TABLET | Refills: 1 | Status: SHIPPED | OUTPATIENT
Start: 2023-05-26 | End: 2023-05-28

## 2023-05-26 NOTE — LETTER
May 26, 2023     Terri Park PA-C  [address] [facility]  [address]  [place] [id number]    Patient: Reji Morales   YOB: 1950   Date of Visit: 5/26/2023       Dear Dr. Xavier PA-C:    Thank you for referring Reji Morales to me for evaluation. Below are the relevant portions of my assessment and plan of care.    If you have questions, please do not hesitate to call me. I look forward to following Reji along with you.         Sincerely,        Moses Canchola MD        CC: MD Jesika Gibson, Moses LOPEZ MD  05/26/23 1101  Signed  Subjective    Reji Morales is a 73 y.o. male.     History of Present Illness     He has known diabetes mellitus since 2012.  He is on Actoplus MET 15/500 1 tablet every morning and 2 tablets every evening and Ozempic 1 mg weekly.  He does not check his blood sugars at home.  He denies hypoglycemic episodes.  He has gained 10 pounds since 1/23.  His last meal was last night.     His last eye examination was in June 2022.  He denies retinopathy.  He denies eye complaints.  Urine microalbumin was normal in 7/22.  He denies numbness, tingling or burning in his hands or feet.     He has hyperlipidemia and is on Crestor 20 mg/day, fenofibrate 145 mg/day, and Vascepa 2 g twice a day.  He denies myalgia.     He has hypertension and is on irbesartan 150 mg/day and amlodipine 2.5 mg/day.  He has mild to moderate aortic valve stenosis which is being monitored by Dr. Hand.  He has no history of heart attack or stroke.  He denies chest pain or shortness of breath.    He has carotid artery disease.  Ultrasound done in August 2022 showed 16 to 49% stenosis of the left internal carotid artery.  He is being monitored by Dr. Hand.  He is on aspirin     He has vitamin D deficiency and is on ergocalciferol 50,000 units twice a week.  He denies diarrhea.       The following portions of the patient's history were reviewed and updated as appropriate: allergies, current medications,  "past family history, past medical history, past social history, past surgical history and problem list.    Review of Systems   Eyes: Negative for visual disturbance.   Respiratory: Negative for shortness of breath.    Cardiovascular: Negative for chest pain and leg swelling.   Gastrointestinal: Negative for constipation and diarrhea.   Endocrine: Negative for polyuria.   Genitourinary: Negative.    Musculoskeletal: Negative for gait problem.   Neurological: Negative for numbness.     Vitals:    05/26/23 0953   BP: 128/84   Pulse: 66   SpO2: 98%   Weight: 105 kg (231 lb)   Height: 172.7 cm (67.99\")      Objective    Physical Exam  Constitutional:       Appearance: Normal appearance. He is obese.   Eyes:      General: No scleral icterus.        Right eye: No discharge.         Left eye: No discharge.      Extraocular Movements: Extraocular movements intact.      Conjunctiva/sclera: Conjunctivae normal.   Neck:      Vascular: No carotid bruit.   Cardiovascular:      Rate and Rhythm: Normal rate and regular rhythm.      Pulses: Normal pulses.      Heart sounds: Normal heart sounds.   Pulmonary:      Effort: Pulmonary effort is normal. No respiratory distress.      Breath sounds: Normal breath sounds. No stridor. No rales.   Chest:      Chest wall: No tenderness.   Abdominal:      General: Bowel sounds are normal.      Palpations: Abdomen is soft.      Tenderness: There is no right CVA tenderness or left CVA tenderness.   Musculoskeletal:      Right lower leg: No edema.      Left lower leg: No edema.   Lymphadenopathy:      Cervical: No cervical adenopathy.   Neurological:      Mental Status: He is alert and oriented to person, place, and time.   Psychiatric:         Mood and Affect: Mood normal.         Behavior: Behavior normal.       Office Visit on 04/04/2023   Component Date Value Ref Range Status   • WBC 04/04/2023 7.78  3.40 - 10.80 10*3/mm3 Final   • RBC 04/04/2023 4.28  4.14 - 5.80 10*6/mm3 Final   • Hemoglobin " 04/04/2023 13.9  13.0 - 17.7 g/dL Final   • Hematocrit 04/04/2023 40.7  37.5 - 51.0 % Final   • MCV 04/04/2023 95.1  79.0 - 97.0 fL Final   • MCH 04/04/2023 32.5  26.6 - 33.0 pg Final   • MCHC 04/04/2023 34.2  31.5 - 35.7 g/dL Final   • RDW 04/04/2023 12.8  12.3 - 15.4 % Final   • Platelets 04/04/2023 267  140 - 450 10*3/mm3 Final   • Neutrophil Rel % 04/04/2023 76.2 (H)  42.7 - 76.0 % Final   • Lymphocyte Rel % 04/04/2023 14.3 (L)  19.6 - 45.3 % Final   • Monocyte Rel % 04/04/2023 6.9  5.0 - 12.0 % Final   • Eosinophil Rel % 04/04/2023 1.9  0.3 - 6.2 % Final   • Basophil Rel % 04/04/2023 0.4  0.0 - 1.5 % Final   • Neutrophils Absolute 04/04/2023 5.93  1.70 - 7.00 10*3/mm3 Final   • Lymphocytes Absolute 04/04/2023 1.11  0.70 - 3.10 10*3/mm3 Final   • Monocytes Absolute 04/04/2023 0.54  0.10 - 0.90 10*3/mm3 Final   • Eosinophils Absolute 04/04/2023 0.15  0.00 - 0.40 10*3/mm3 Final   • Basophils Absolute 04/04/2023 0.03  0.00 - 0.20 10*3/mm3 Final   • Immature Granulocyte Rel % 04/04/2023 0.3  0.0 - 0.5 % Final   • Immature Grans Absolute 04/04/2023 0.02  0.00 - 0.05 10*3/mm3 Final   • nRBC 04/04/2023 0.0  0.0 - 0.2 /100 WBC Final   • Vitamin B-12 04/04/2023 593  211 - 946 pg/mL Final    Results may be falsely increased if patient taking Biotin.   • Folate 04/04/2023 >20.00  4.78 - 24.20 ng/mL Final    Results may be falsely increased if patient taking Biotin.   • Ferritin 04/04/2023 109.00  30.00 - 400.00 ng/mL Final    Results may be falsely decreased if patient taking Biotin.   • TIBC 04/04/2023 568  mcg/dL Final   • UIBC 04/04/2023 429 (H)  112 - 346 mcg/dL Final   • Iron 04/04/2023 139  59 - 158 mcg/dL Final   • Iron Saturation 04/04/2023 24  20 - 50 % Final     Assessment & Plan   Diagnoses and all orders for this visit:    1. Type 2 diabetes mellitus without complication, without long-term current use of insulin (Primary)  -     Comprehensive Metabolic Panel  -     Hemoglobin A1c  -     Lipid Panel  -      TSH    2. Mixed hyperlipidemia  -     Comprehensive Metabolic Panel  -     Lipid Panel  -     TSH    3. Essential hypertension  -     Comprehensive Metabolic Panel    4. Vitamin D deficiency  -     Vitamin D,25-Hydroxy    5. Nonrheumatic aortic valve stenosis    6. Bilateral carotid artery stenosis  -     Lipid Panel    7. Controlled type 2 diabetes mellitus without complication, without long-term current use of insulin  -     pioglitazone-metFORMIN (ACTOPLUS MET)  MG per tablet; Take 1 tablet by mouth every morning and then take 2 tablets by mouth every evening.  Dispense: 270 tablet; Refill: 1      Continue Ozempic 1 mg weekly and Actoplus MET 15/500 1 tablet every morning and 2 tablets every evening.  Consider increasing Ozempic to 2 mg weekly and decreasing Actoplus MET 15/850 mg to 1 tablet twice daily to promote weight loss.  Continue Crestor 20 mg/day, fenofibrate 145 mg a day and Vascepa 2 g twice a day.  Continue irbesartan and amlodipine.  Follow-up with Dr. Hand with regards to her carotid artery stenosis and hypertension.  Continue ergocalciferol 50,000 units twice a week.    Copy of my note sent to JAVIER Valle and Dr. Hand.    RTC 4 mos with COLTON Park NP and with me in 8 mos.

## 2023-05-26 NOTE — PROGRESS NOTES
Kalie Morales is a 73 y.o. male.     History of Present Illness     He has known diabetes mellitus since 2012.  He is on Actoplus MET 15/500 1 tablet every morning and 2 tablets every evening and Ozempic 1 mg weekly.  He does not check his blood sugars at home.  He denies hypoglycemic episodes.  He has gained 10 pounds since 1/23.  His last meal was last night.     His last eye examination was in June 2022.  He denies retinopathy.  He denies eye complaints.  Urine microalbumin was normal in 7/22.  He denies numbness, tingling or burning in his hands or feet.     He has hyperlipidemia and is on Crestor 20 mg/day, fenofibrate 145 mg/day, and Vascepa 2 g twice a day.  He denies myalgia.     He has hypertension and is on irbesartan 150 mg/day and amlodipine 2.5 mg/day.  He has mild to moderate aortic valve stenosis which is being monitored by Dr. Hand.  He has no history of heart attack or stroke.  He denies chest pain or shortness of breath.    He has carotid artery disease.  Ultrasound done in August 2022 showed 16 to 49% stenosis of the left internal carotid artery.  He is being monitored by Dr. Hand.  He is on aspirin     He has vitamin D deficiency and is on ergocalciferol 50,000 units twice a week.  He denies diarrhea.       The following portions of the patient's history were reviewed and updated as appropriate: allergies, current medications, past family history, past medical history, past social history, past surgical history and problem list.    Review of Systems   Eyes: Negative for visual disturbance.   Respiratory: Negative for shortness of breath.    Cardiovascular: Negative for chest pain and leg swelling.   Gastrointestinal: Negative for constipation and diarrhea.   Endocrine: Negative for polyuria.   Genitourinary: Negative.    Musculoskeletal: Negative for gait problem.   Neurological: Negative for numbness.     Vitals:    05/26/23 0953   BP: 128/84   Pulse: 66   SpO2: 98%   Weight:  "105 kg (231 lb)   Height: 172.7 cm (67.99\")      Objective   Physical Exam  Constitutional:       Appearance: Normal appearance. He is obese.   Eyes:      General: No scleral icterus.        Right eye: No discharge.         Left eye: No discharge.      Extraocular Movements: Extraocular movements intact.      Conjunctiva/sclera: Conjunctivae normal.   Neck:      Vascular: No carotid bruit.   Cardiovascular:      Rate and Rhythm: Normal rate and regular rhythm.      Pulses: Normal pulses.      Heart sounds: Normal heart sounds.   Pulmonary:      Effort: Pulmonary effort is normal. No respiratory distress.      Breath sounds: Normal breath sounds. No stridor. No rales.   Chest:      Chest wall: No tenderness.   Abdominal:      General: Bowel sounds are normal.      Palpations: Abdomen is soft.      Tenderness: There is no right CVA tenderness or left CVA tenderness.   Musculoskeletal:      Right lower leg: No edema.      Left lower leg: No edema.   Lymphadenopathy:      Cervical: No cervical adenopathy.   Neurological:      Mental Status: He is alert and oriented to person, place, and time.   Psychiatric:         Mood and Affect: Mood normal.         Behavior: Behavior normal.       Office Visit on 04/04/2023   Component Date Value Ref Range Status   • WBC 04/04/2023 7.78  3.40 - 10.80 10*3/mm3 Final   • RBC 04/04/2023 4.28  4.14 - 5.80 10*6/mm3 Final   • Hemoglobin 04/04/2023 13.9  13.0 - 17.7 g/dL Final   • Hematocrit 04/04/2023 40.7  37.5 - 51.0 % Final   • MCV 04/04/2023 95.1  79.0 - 97.0 fL Final   • MCH 04/04/2023 32.5  26.6 - 33.0 pg Final   • MCHC 04/04/2023 34.2  31.5 - 35.7 g/dL Final   • RDW 04/04/2023 12.8  12.3 - 15.4 % Final   • Platelets 04/04/2023 267  140 - 450 10*3/mm3 Final   • Neutrophil Rel % 04/04/2023 76.2 (H)  42.7 - 76.0 % Final   • Lymphocyte Rel % 04/04/2023 14.3 (L)  19.6 - 45.3 % Final   • Monocyte Rel % 04/04/2023 6.9  5.0 - 12.0 % Final   • Eosinophil Rel % 04/04/2023 1.9  0.3 - 6.2 % " Final   • Basophil Rel % 04/04/2023 0.4  0.0 - 1.5 % Final   • Neutrophils Absolute 04/04/2023 5.93  1.70 - 7.00 10*3/mm3 Final   • Lymphocytes Absolute 04/04/2023 1.11  0.70 - 3.10 10*3/mm3 Final   • Monocytes Absolute 04/04/2023 0.54  0.10 - 0.90 10*3/mm3 Final   • Eosinophils Absolute 04/04/2023 0.15  0.00 - 0.40 10*3/mm3 Final   • Basophils Absolute 04/04/2023 0.03  0.00 - 0.20 10*3/mm3 Final   • Immature Granulocyte Rel % 04/04/2023 0.3  0.0 - 0.5 % Final   • Immature Grans Absolute 04/04/2023 0.02  0.00 - 0.05 10*3/mm3 Final   • nRBC 04/04/2023 0.0  0.0 - 0.2 /100 WBC Final   • Vitamin B-12 04/04/2023 593  211 - 946 pg/mL Final    Results may be falsely increased if patient taking Biotin.   • Folate 04/04/2023 >20.00  4.78 - 24.20 ng/mL Final    Results may be falsely increased if patient taking Biotin.   • Ferritin 04/04/2023 109.00  30.00 - 400.00 ng/mL Final    Results may be falsely decreased if patient taking Biotin.   • TIBC 04/04/2023 568  mcg/dL Final   • UIBC 04/04/2023 429 (H)  112 - 346 mcg/dL Final   • Iron 04/04/2023 139  59 - 158 mcg/dL Final   • Iron Saturation 04/04/2023 24  20 - 50 % Final     Assessment & Plan   Diagnoses and all orders for this visit:    1. Type 2 diabetes mellitus without complication, without long-term current use of insulin (Primary)  -     Comprehensive Metabolic Panel  -     Hemoglobin A1c  -     Lipid Panel  -     TSH    2. Mixed hyperlipidemia  -     Comprehensive Metabolic Panel  -     Lipid Panel  -     TSH    3. Essential hypertension  -     Comprehensive Metabolic Panel    4. Vitamin D deficiency  -     Vitamin D,25-Hydroxy    5. Nonrheumatic aortic valve stenosis    6. Bilateral carotid artery stenosis  -     Lipid Panel    7. Controlled type 2 diabetes mellitus without complication, without long-term current use of insulin  -     pioglitazone-metFORMIN (ACTOPLUS MET)  MG per tablet; Take 1 tablet by mouth every morning and then take 2 tablets by mouth  every evening.  Dispense: 270 tablet; Refill: 1      Continue Ozempic 1 mg weekly and Actoplus MET 15/500 1 tablet every morning and 2 tablets every evening.  Consider increasing Ozempic to 2 mg weekly and decreasing Actoplus MET 15/850 mg to 1 tablet twice daily to promote weight loss.  Continue Crestor 20 mg/day, fenofibrate 145 mg a day and Vascepa 2 g twice a day.  Continue irbesartan and amlodipine.  Follow-up with Dr. Hand with regards to her carotid artery stenosis and hypertension.  Continue ergocalciferol 50,000 units twice a week.    Copy of my note sent to JAVIER Valle and Dr. Hand.    RTC 4 mos with COLTON Park NP and with me in 8 mos.

## 2023-05-27 LAB
25(OH)D3+25(OH)D2 SERPL-MCNC: 57.3 NG/ML (ref 30–100)
ALBUMIN SERPL-MCNC: 4.2 G/DL (ref 3.5–5.2)
ALBUMIN/GLOB SERPL: 1.9 G/DL
ALP SERPL-CCNC: 37 U/L (ref 39–117)
ALT SERPL-CCNC: 15 U/L (ref 1–41)
AST SERPL-CCNC: 13 U/L (ref 1–40)
BILIRUB SERPL-MCNC: 0.8 MG/DL (ref 0–1.2)
BUN SERPL-MCNC: 15 MG/DL (ref 8–23)
BUN/CREAT SERPL: 17.4 (ref 7–25)
CALCIUM SERPL-MCNC: 9.9 MG/DL (ref 8.6–10.5)
CHLORIDE SERPL-SCNC: 102 MMOL/L (ref 98–107)
CHOLEST SERPL-MCNC: 102 MG/DL (ref 0–200)
CO2 SERPL-SCNC: 27.1 MMOL/L (ref 22–29)
CREAT SERPL-MCNC: 0.86 MG/DL (ref 0.76–1.27)
EGFRCR SERPLBLD CKD-EPI 2021: 91.4 ML/MIN/1.73
GLOBULIN SER CALC-MCNC: 2.2 GM/DL
GLUCOSE SERPL-MCNC: 102 MG/DL (ref 65–99)
HBA1C MFR BLD: 5.7 % (ref 4.8–5.6)
HDLC SERPL-MCNC: 50 MG/DL (ref 40–60)
IMP & REVIEW OF LAB RESULTS: NORMAL
LDLC SERPL CALC-MCNC: 37 MG/DL (ref 0–100)
POTASSIUM SERPL-SCNC: 4.6 MMOL/L (ref 3.5–5.2)
PROT SERPL-MCNC: 6.4 G/DL (ref 6–8.5)
SODIUM SERPL-SCNC: 139 MMOL/L (ref 136–145)
TRIGL SERPL-MCNC: 73 MG/DL (ref 0–150)
TSH SERPL DL<=0.005 MIU/L-ACNC: 2.6 UIU/ML (ref 0.27–4.2)
VLDLC SERPL CALC-MCNC: 15 MG/DL (ref 5–40)

## 2023-05-28 DIAGNOSIS — E11.9 CONTROLLED TYPE 2 DIABETES MELLITUS WITHOUT COMPLICATION, WITHOUT LONG-TERM CURRENT USE OF INSULIN: ICD-10-CM

## 2023-05-28 RX ORDER — SEMAGLUTIDE 2.68 MG/ML
INJECTION, SOLUTION SUBCUTANEOUS
Qty: 9 ML | Refills: 2 | Status: SHIPPED | OUTPATIENT
Start: 2023-05-28

## 2023-05-28 RX ORDER — PIOGLITAZONE HCL AND METFORMIN HCL 500; 15 MG/1; MG/1
TABLET ORAL
Qty: 180 TABLET | Refills: 1 | Status: SHIPPED | OUTPATIENT
Start: 2023-05-28

## 2023-05-28 NOTE — PROGRESS NOTES
Hemoglobin A1c 5.7%.  Diabetes is well controlled.  Decrease Actoplus MET 15/500 mg to 1 tablet twice a day and increase Ozempic to 2 mg weekly.  Prescription sent to pharmacy.  LDL 37.  HDL 50.  Triglycerides 73.  Continue Crestor 20 mg/day, fenofibrate 145 mg/day and Vascepa 2 g twice a day.  Normal vitamin D.  Continue ergocalciferol 50,000 units twice a week.  Copy of labs sent to JAVIER Valle.  Please notify patient of results and instructions.

## 2023-05-30 ENCOUNTER — TELEPHONE (OUTPATIENT)
Dept: ENDOCRINOLOGY | Age: 73
End: 2023-05-30

## 2023-05-30 NOTE — TELEPHONE ENCOUNTER
5/30 called and lm for pt to call reg her labs   Hub please inform patients, the results of her labs   ----- Message from Moses Canchola MD sent at 5/28/2023  7:40 PM EDT -----  Hemoglobin A1c 5.7%.  Diabetes is well controlled.  Decrease Actoplus MET 15/500 mg to 1 tablet twice a day and increase Ozempic to 2 mg weekly.  Prescription sent to pharmacy.  LDL 37.  HDL 50.  Triglycerides 73.  Continue Crestor 20 mg/day, fenofibrate 145 mg/day and Vascepa 2 g twice a day.  Normal vitamin D.  Continue ergocalciferol 50,000 units twice a week.  Copy of labs sent to JAVIER Valle.  Please notify patient of results and instructions.

## 2023-09-25 ENCOUNTER — TELEPHONE (OUTPATIENT)
Dept: ENDOCRINOLOGY | Age: 73
End: 2023-09-25

## 2023-09-26 ENCOUNTER — OFFICE VISIT (OUTPATIENT)
Dept: ENDOCRINOLOGY | Age: 73
End: 2023-09-26
Payer: MEDICARE

## 2023-09-26 VITALS
HEART RATE: 69 BPM | WEIGHT: 221.6 LBS | HEIGHT: 68 IN | BODY MASS INDEX: 33.59 KG/M2 | TEMPERATURE: 97.8 F | DIASTOLIC BLOOD PRESSURE: 80 MMHG | OXYGEN SATURATION: 99 % | SYSTOLIC BLOOD PRESSURE: 130 MMHG

## 2023-09-26 DIAGNOSIS — I10 ESSENTIAL HYPERTENSION: ICD-10-CM

## 2023-09-26 DIAGNOSIS — E11.9 CONTROLLED TYPE 2 DIABETES MELLITUS WITHOUT COMPLICATION, WITHOUT LONG-TERM CURRENT USE OF INSULIN: Primary | ICD-10-CM

## 2023-09-26 DIAGNOSIS — E55.9 VITAMIN D DEFICIENCY: ICD-10-CM

## 2023-09-26 DIAGNOSIS — E78.2 MIXED HYPERLIPIDEMIA: ICD-10-CM

## 2023-09-26 PROCEDURE — 3079F DIAST BP 80-89 MM HG: CPT | Performed by: NURSE PRACTITIONER

## 2023-09-26 PROCEDURE — 3044F HG A1C LEVEL LT 7.0%: CPT | Performed by: NURSE PRACTITIONER

## 2023-09-26 PROCEDURE — 99214 OFFICE O/P EST MOD 30 MIN: CPT | Performed by: NURSE PRACTITIONER

## 2023-09-26 PROCEDURE — 3075F SYST BP GE 130 - 139MM HG: CPT | Performed by: NURSE PRACTITIONER

## 2023-09-26 NOTE — PROGRESS NOTES
"Chief Complaint  Chief Complaint   Patient presents with    Diabetes     Type 2: Pt doesn't use meter, is up to date on eye exam, no hx of retinopathy or neuropathy. Pt states that he is needing some refills.        Subjective          History of Present Illness    Reji Morales 73 y.o. presents for a follow-up evaluation for type 2 DM     Pt is on the following medications for their DM: pioglitazone-Metformin  mg one tablet in the morning and 2 tablets in the evening and Ozempic 2 mg weekly      He never went down to 1 and 1 on Actos/Metformin that Dr. Canchola wanted him to do, but pt denies hypoglycemia.      Denies nausea, diarrhea, constipation, chest pain, shortness of breath, vision changes or numbness and tingling in feet/hands.    Weight loss of 10 lbs since last visit.    Pt does not have a history of DM retinopathy.  Last eye exam was 06/23  Has cataracts     Pt does not have a history of nephropathy.  Patient is currently taking ARB     Pt does not have neuropathy.       Pt does not have a history of CAD or CVA.    Last A1C in 05/23 was 5.70    Last microalbumin in 07/22 was negative          Blood Sugars    Blood glucoses are not checked     Pt has no episodes of hypoglycemia.            Hyperlipidemia     Pt is currently taking  fenofibrate 145 mg daily, Vascepa 2 gm BID and Crestor 20 mg HS     Last lipid panel in 05/23 showed Total 102, HDL 50, LDL 37 and Triglycerides 73            Hypertension    Pt denies any chest pain, palpitations, shortness of breath or headache    Current regimen includes  irbesartan 150 mg daily amlodipine 2.5 mg daily                    Vitamin D Deficiency     Current treatment includes 50,000 units twice a week     Last Vit D level was 57.3 in 05/23              I have reviewed the patient's allergies, medicines, past medical hx, family hx and social hx.    Objective   Vital Signs:   /80   Pulse 69   Temp 97.8 °F (36.6 °C) (Oral)   Ht 172.7 cm (67.99\")   Wt 101 " kg (221 lb 9.6 oz)   SpO2 99%   BMI 33.70 kg/m²       Physical Exam   Physical Exam  Constitutional:       General: He is not in acute distress.     Appearance: Normal appearance. He is not diaphoretic.   HENT:      Head: Normocephalic and atraumatic.   Eyes:      General:         Right eye: No discharge.         Left eye: No discharge.   Skin:     General: Skin is warm and dry.   Neurological:      Mental Status: He is alert.   Psychiatric:         Mood and Affect: Mood normal.         Behavior: Behavior normal.                  Results Review:   Hemoglobin A1C   Date Value Ref Range Status   05/26/2023 5.70 (H) 4.80 - 5.60 % Final     Comment:     Hemoglobin A1C Ranges:  Increased Risk for Diabetes  5.7% to 6.4%  Diabetes                     >= 6.5%  Diabetic Goal                < 7.0%       Triglycerides   Date Value Ref Range Status   05/26/2023 73 0 - 150 mg/dL Final     HDL Cholesterol   Date Value Ref Range Status   05/26/2023 50 40 - 60 mg/dL Final     LDL Chol Calc (NIH)   Date Value Ref Range Status   05/26/2023 37 0 - 100 mg/dL Final     VLDL Cholesterol Kurt   Date Value Ref Range Status   05/26/2023 15 5 - 40 mg/dL Final         Assessment and Plan {CC Problem List  Visit Diagnosis  ROS  Review (Popup)  Health Maintenance  Quality  BestPractice  Medications  SmartSets  SnapShot Encounters  Media :23  Diagnoses and all orders for this visit:    1. Controlled type 2 diabetes mellitus without complication, without long-term current use of insulin (Primary)  -     Hemoglobin A1c  -     Comprehensive Metabolic Panel  -     Microalbumin / Creatinine Urine Ratio - Urine, Clean Catch    Check labs today  Continue with pioglitazone-Metformin  mg one tablet in the morning and 2 tablets in the evening and Ozempic 2 mg weekly      2. Mixed hyperlipidemia  -     Comprehensive Metabolic Panel  -     Lipid Panel    Check labs today  Continue with statin, fenofibrate and Vascepa      3. Essential  hypertension  -     Comprehensive Metabolic Panel    Stable  Continue with current medication regimen  Defer management to PCP       4. Vitamin D deficiency       Continue with supplement        No refills needed at this time      Labs today  RTC on 01/26/24 with Dr. Canchola      Follow Up     Patient was given instructions and counseling regarding her condition or for health maintenance advice. Please see specific information pulled into the AVS if appropriate.              Melissa Park, THAIS  09/26/23

## 2023-09-27 ENCOUNTER — TELEPHONE (OUTPATIENT)
Dept: ENDOCRINOLOGY | Age: 73
End: 2023-09-27
Payer: MEDICARE

## 2023-09-27 LAB
ALBUMIN SERPL-MCNC: 4.5 G/DL (ref 3.5–5.2)
ALBUMIN/GLOB SERPL: 2.4 G/DL
ALP SERPL-CCNC: 33 U/L (ref 39–117)
ALT SERPL-CCNC: 20 U/L (ref 1–41)
AST SERPL-CCNC: 29 U/L (ref 1–40)
BILIRUB SERPL-MCNC: 0.8 MG/DL (ref 0–1.2)
BUN SERPL-MCNC: 22 MG/DL (ref 8–23)
BUN/CREAT SERPL: 24.2 (ref 7–25)
CALCIUM SERPL-MCNC: 9.6 MG/DL (ref 8.6–10.5)
CHLORIDE SERPL-SCNC: 104 MMOL/L (ref 98–107)
CHOLEST SERPL-MCNC: 111 MG/DL (ref 0–200)
CO2 SERPL-SCNC: 24.2 MMOL/L (ref 22–29)
CREAT SERPL-MCNC: 0.91 MG/DL (ref 0.76–1.27)
EGFRCR SERPLBLD CKD-EPI 2021: 89 ML/MIN/1.73
GLOBULIN SER CALC-MCNC: 1.9 GM/DL
GLUCOSE SERPL-MCNC: 103 MG/DL (ref 65–99)
HBA1C MFR BLD: 5.5 % (ref 4.8–5.6)
HDLC SERPL-MCNC: 56 MG/DL (ref 40–60)
IMP & REVIEW OF LAB RESULTS: NORMAL
LDLC SERPL CALC-MCNC: 42 MG/DL (ref 0–100)
POTASSIUM SERPL-SCNC: 4.4 MMOL/L (ref 3.5–5.2)
PROT SERPL-MCNC: 6.4 G/DL (ref 6–8.5)
SODIUM SERPL-SCNC: 138 MMOL/L (ref 136–145)
TRIGL SERPL-MCNC: 61 MG/DL (ref 0–150)
UNABLE TO VOID: NORMAL
VLDLC SERPL CALC-MCNC: 13 MG/DL (ref 5–40)

## 2023-09-27 NOTE — TELEPHONE ENCOUNTER
Left pt a message to return call.  Okay for hub to read results.     A1c is great at 5.5%.  Change pioglitazone-Metformin  mg to one tablet twice daily     Kidney function is good    Lipid panel is good.  Continue with statin, Vascepa and fenofibrate

## 2023-10-05 ENCOUNTER — OFFICE VISIT (OUTPATIENT)
Dept: CARDIOLOGY | Facility: CLINIC | Age: 73
End: 2023-10-05
Payer: MEDICARE

## 2023-10-05 ENCOUNTER — OFFICE VISIT (OUTPATIENT)
Dept: FAMILY MEDICINE CLINIC | Facility: CLINIC | Age: 73
End: 2023-10-05
Payer: MEDICARE

## 2023-10-05 VITALS
DIASTOLIC BLOOD PRESSURE: 78 MMHG | BODY MASS INDEX: 33.4 KG/M2 | WEIGHT: 220.4 LBS | HEART RATE: 65 BPM | SYSTOLIC BLOOD PRESSURE: 150 MMHG | HEIGHT: 68 IN

## 2023-10-05 VITALS
RESPIRATION RATE: 16 BRPM | WEIGHT: 220 LBS | TEMPERATURE: 97.5 F | OXYGEN SATURATION: 100 % | HEIGHT: 68 IN | BODY MASS INDEX: 33.34 KG/M2 | HEART RATE: 98 BPM | DIASTOLIC BLOOD PRESSURE: 70 MMHG | SYSTOLIC BLOOD PRESSURE: 132 MMHG

## 2023-10-05 DIAGNOSIS — I65.23 BILATERAL CAROTID ARTERY STENOSIS: ICD-10-CM

## 2023-10-05 DIAGNOSIS — E11.9 CONTROLLED TYPE 2 DIABETES MELLITUS WITHOUT COMPLICATION, WITHOUT LONG-TERM CURRENT USE OF INSULIN: ICD-10-CM

## 2023-10-05 DIAGNOSIS — Z12.5 SCREENING PSA (PROSTATE SPECIFIC ANTIGEN): ICD-10-CM

## 2023-10-05 DIAGNOSIS — I35.0 NONRHEUMATIC AORTIC VALVE STENOSIS: Primary | ICD-10-CM

## 2023-10-05 DIAGNOSIS — I10 ESSENTIAL HYPERTENSION: ICD-10-CM

## 2023-10-05 DIAGNOSIS — R79.0 LOW IRON STORES: ICD-10-CM

## 2023-10-05 DIAGNOSIS — E78.2 MIXED HYPERLIPIDEMIA: ICD-10-CM

## 2023-10-05 DIAGNOSIS — I10 ESSENTIAL HYPERTENSION: Primary | ICD-10-CM

## 2023-10-05 DIAGNOSIS — E55.9 VITAMIN D DEFICIENCY: ICD-10-CM

## 2023-10-05 PROCEDURE — 99214 OFFICE O/P EST MOD 30 MIN: CPT | Performed by: INTERNAL MEDICINE

## 2023-10-05 PROCEDURE — 3077F SYST BP >= 140 MM HG: CPT | Performed by: INTERNAL MEDICINE

## 2023-10-05 PROCEDURE — 3078F DIAST BP <80 MM HG: CPT | Performed by: INTERNAL MEDICINE

## 2023-10-05 RX ORDER — FENOFIBRATE 145 MG/1
145 TABLET, COATED ORAL DAILY
Qty: 90 TABLET | Refills: 1 | Status: SHIPPED | OUTPATIENT
Start: 2023-10-05

## 2023-10-05 RX ORDER — ROSUVASTATIN CALCIUM 20 MG/1
20 TABLET, COATED ORAL DAILY
Qty: 90 TABLET | Refills: 1 | Status: SHIPPED | OUTPATIENT
Start: 2023-10-05

## 2023-10-05 RX ORDER — ICOSAPENT ETHYL 1000 MG/1
CAPSULE ORAL
Qty: 360 CAPSULE | Refills: 1 | Status: SHIPPED | OUTPATIENT
Start: 2023-10-05

## 2023-10-05 RX ORDER — IRBESARTAN 150 MG/1
150 TABLET ORAL DAILY
Qty: 90 TABLET | Refills: 1 | Status: SHIPPED | OUTPATIENT
Start: 2023-10-05

## 2023-10-05 RX ORDER — AMLODIPINE BESYLATE 2.5 MG/1
2.5 TABLET ORAL DAILY
Qty: 90 TABLET | Refills: 1 | Status: SHIPPED | OUTPATIENT
Start: 2023-10-05

## 2023-10-05 NOTE — PROGRESS NOTES
Kalie Morales is a 73 y.o. male.     History of Present Illness       He did have carotid artery bruits on prior exam in carotid Doppler was done on 8/9/2022 noting plaque on right internal carotid artery without significant stenosis, proximal left internal carotid artery mild stenosis, mid left internal carotid artery mild stenosis.  I do have a follow-up carotid Doppler ordered and this is due  Need records from 7/22/2019 that patient had been to urology Dr. Hampton noted nodular prostate and due to age follow-up as needed.     I do have him on iron QOD last labs were in April and noted that serum iron was fantastic... Normal hemoglobin and continue every other day iron.  Had follow-up today with Dr. Hand cardiologist for aortic stenosis in his dictation from today's visit is still pending and we will review that and the EKG performed.    Endocrine-----down weight----stays active  9-26-23---eye exam 6/23----he had Ozempic increased prior with Dr Canchola----to 2mg and then with excellent hemoglobin A1c at 5.5% she decreased the pioglitazone metformin to 1 tab twice daily.  She noted lipid panel with goals met and continue statin, Vascepa, fenofibrate.  Patient did follow-up with labs and note his potassium level was 5.1 on 5/25/2022.  Endocrinology noted his hyponatremia and did order additional labs to include ACTH, adrenal 21-hydroxylase autoantibodies, cortisol.  Seen with  reviewed his results on 6/5/2022 noted that his serum sodium was stable with normal ACTH, cortisol and 21 hydroxylase antibody.  Low serum sodium may be due to hydrochlorothiazide and suggest irbesartan hydrochlorothiazide and take off HCTZ.  Also to manage his hypertension.   The following portions of the patient's history were reviewed and updated as appropriate: allergies, current medications, past family history, past medical history, past social history, past surgical history, and problem list.    Review of Systems    Constitutional:  Negative for diaphoresis.   HENT:  Negative for nosebleeds and trouble swallowing.    Eyes:  Negative for blurred vision and visual disturbance.   Respiratory:  Negative for choking.    Gastrointestinal:  Negative for blood in stool.   Allergic/Immunologic: Negative for immunocompromised state.   Neurological:  Negative for facial asymmetry and speech difficulty.   Psychiatric/Behavioral:  Negative for self-injury and suicidal ideas.      Objective   Physical Exam  Vitals and nursing note reviewed.   Constitutional:       General: He is not in acute distress.     Appearance: He is well-developed. He is not diaphoretic.   HENT:      Head: Normocephalic.   Eyes:      Conjunctiva/sclera: Conjunctivae normal.      Pupils: Pupils are equal, round, and reactive to light.   Neck:      Vascular: Carotid bruit present.   Cardiovascular:      Rate and Rhythm: Normal rate and regular rhythm.      Pulses: Normal pulses.      Heart sounds: Murmur heard.   Pulmonary:      Effort: Pulmonary effort is normal.      Breath sounds: Normal breath sounds.   Musculoskeletal:         General: Normal range of motion.      Cervical back: Normal range of motion and neck supple.      Right lower leg: Edema present.      Left lower leg: Edema present.      Comments: Just over trace bilat   Skin:     General: Skin is warm and dry.      Findings: No rash.   Neurological:      Mental Status: He is alert and oriented to person, place, and time.   Psychiatric:         Mood and Affect: Affect is not inappropriate.         Behavior: Behavior normal.         Thought Content: Thought content normal.         Judgment: Judgment normal.         Assessment & Plan   Diagnoses and all orders for this visit:    1. Essential hypertension (Primary)  -     CBC & Differential  -     PSA Screen  -     Ferritin  -     Iron Profile    2. Mixed hyperlipidemia  -     CBC & Differential  -     PSA Screen  -     Ferritin  -     Iron Profile    3.  Bilateral carotid artery stenosis  -     CBC & Differential  -     PSA Screen  -     Ferritin  -     Iron Profile    4. Controlled type 2 diabetes mellitus without complication, without long-term current use of insulin  -     CBC & Differential  -     PSA Screen  -     Ferritin  -     Iron Profile    5. Vitamin D deficiency  -     CBC & Differential  -     PSA Screen  -     Ferritin  -     Iron Profile    6. Screening PSA (prostate specific antigen)  -     CBC & Differential  -     PSA Screen  -     Ferritin  -     Iron Profile    7. Low iron stores  -     CBC & Differential  -     PSA Screen  -     Ferritin  -     Iron Profile      He will call centralized scheduling to set up his carotid Doppler to follow-up on that mild stenosis left  Update labs---PSA, iron---taking QOD;    Plan, Reji Morales, was seen today.  he was seen for HTN and continue medication, DMII followed by Endocrinologist, Hyperlipidemia and will continue current medication, and Vitamin D deficiency and supplemented.  Update labs  Flu shot  Consider Shingrix

## 2023-10-05 NOTE — PROGRESS NOTES
"      CARDIOLOGY    Devan Hand MD    ENCOUNTER DATE:  10/05/2023    Reji Morales / 73 y.o. / male        CHIEF COMPLAINT / REASON FOR OFFICE VISIT     Nonrheumatic aortic valve stenosis (10/06/2022 Follow up)  Hypertension    HISTORY OF PRESENT ILLNESS       HPI  Reji Morales is a 73 y.o. male who presents today for reevaluation.  Clinically he is doing well patient denies any types of cardiac symptoms.  He has not had chest pains dizziness lightheadedness or any other issues.      The following portions of the patient's history were reviewed and updated as appropriate: allergies, current medications, past family history, past medical history, past social history, past surgical history and problem list.      VITAL SIGNS     Visit Vitals  /78 (BP Location: Left arm)   Pulse 65   Ht 172.7 cm (68\")   Wt 100 kg (220 lb 6.4 oz)   BMI 33.51 kg/m²         Wt Readings from Last 3 Encounters:   10/05/23 99.8 kg (220 lb)   10/05/23 100 kg (220 lb 6.4 oz)   09/26/23 101 kg (221 lb 9.6 oz)     Body mass index is 33.51 kg/m².      REVIEW OF SYSTEMS   ROS        PHYSICAL EXAMINATION     Vitals reviewed.   Cardiovascular:      Normal rate. Regular rhythm. Normal S1. Normal S2.       Murmurs: There is a grade 1/6 harsh midsystolic murmur at the URSB, radiating to the neck.      No gallop.  No click. No rub.   Pulses:     Intact distal pulses.   Edema:     Peripheral edema absent.         REVIEWED DATA       ECG 12 Lead    Date/Time: 10/5/2023 1:05 PM  Performed by: Devan Hand MD  Authorized by: Devan Hand MD   Comparison: compared with previous ECG from 7/19/2022  Similar to previous ECG  Rhythm: sinus rhythm  Other findings: early transition    Clinical impression: non-specific ECG        Cardiac Procedures:      Lipid Panel          1/27/2023    09:59 5/26/2023    11:20 9/26/2023    10:33   Lipid Panel   Total Cholesterol 109  102  111    Triglycerides 58  73  61    HDL Cholesterol 60  50  56  "   VLDL Cholesterol 13  15  13    LDL Cholesterol  36  37  42          ASSESSMENT & PLAN      Diagnosis Plan   1. Nonrheumatic aortic valve stenosis        2. Essential hypertension              SUMMARY/DISCUSSION  Hypertension blood pressure is little bit on the high side.  We did repeat it and remained at the 150/80 kelsey.  We will have him follow his blood pressure and report back in about 4 weeks to see how its overall doing.  He remains asymptomatic.  Mild to moderate aortic stenosis.  This is by echo on 8/9/2022.  He continues remain asymptomatic doing well with no issues.  Letter that I would not redo his echo this year I will follow-up with him in a year and consider it based on his symptoms and how he is doing and how his murmur sounds.        MEDICATIONS         Discharge Medications            Accurate as of October 5, 2023 12:40 PM. If you have any questions, ask your nurse or doctor.                Changes to Medications        Instructions Start Date   fenofibrate 145 MG tablet  Commonly known as: TRICOR  What changed: additional instructions  Changed by: Terri Park PA-C   145 mg, Oral, Daily, For trigs      pioglitazone-metFORMIN  MG per tablet  Commonly known as: ACTOPLUS MET  What changed: additional instructions   1 tablet twice daily             Continue These Medications        Instructions Start Date   amLODIPine 2.5 MG tablet  Commonly known as: NORVASC   2.5 mg, Oral, Daily, for blood pressure      aspirin 81 MG disintegrating tablet   Oral      ferrous sulfate 325 (65 Fe) MG tablet   1 tablet, Oral, Every Other Day      IMMUNE FORMULA PO   Oral      irbesartan 150 MG tablet  Commonly known as: AVAPRO   150 mg, Oral, Daily, For BP      Ozempic (2 MG/DOSE) 8 MG/3ML solution pen-injector  Generic drug: Semaglutide (2 MG/DOSE)   2 mg weekly subcutaneously      rosuvastatin 20 MG tablet  Commonly known as: CRESTOR   20 mg, Oral, Daily, For cholesterol      Vascepa 1 g capsule  capsule  Generic drug: icosapent ethyl   TAKE FOUR CAPSULES BY MOUTH EVERY DAY for trigs      vitamin b complex tablet tablet   Oral      vitamin D 1.25 MG (67772 UT) capsule capsule  Commonly known as: ERGOCALCIFEROL   TAKE ONE CAPSULE BY MOUTH TWICE A WEEK                   **Dragon Disclaimer:   Much of this encounter note is an electronic transcription/translation of spoken language to printed text. The electronic translation of spoken language may permit erroneous, or at times, nonsensical words or phrases to be inadvertently transcribed. Although I have reviewed the note for such errors, some may still exist.

## 2023-10-05 NOTE — PROGRESS NOTES
Immunization  Immunization performed in LD by Christy Angel MA. Patient tolerated the procedure well without complications.  10/05/23   Christy Angel MA

## 2023-10-06 DIAGNOSIS — E55.9 VITAMIN D DEFICIENCY: ICD-10-CM

## 2023-10-06 LAB
BASOPHILS # BLD AUTO: 0.03 10*3/MM3 (ref 0–0.2)
BASOPHILS NFR BLD AUTO: 0.6 % (ref 0–1.5)
EOSINOPHIL # BLD AUTO: 0.1 10*3/MM3 (ref 0–0.4)
EOSINOPHIL NFR BLD AUTO: 1.9 % (ref 0.3–6.2)
ERYTHROCYTE [DISTWIDTH] IN BLOOD BY AUTOMATED COUNT: 13.3 % (ref 12.3–15.4)
FERRITIN SERPL-MCNC: 110 NG/ML (ref 30–400)
HCT VFR BLD AUTO: 38.6 % (ref 37.5–51)
HGB BLD-MCNC: 13.1 G/DL (ref 13–17.7)
IMM GRANULOCYTES # BLD AUTO: 0.02 10*3/MM3 (ref 0–0.05)
IMM GRANULOCYTES NFR BLD AUTO: 0.4 % (ref 0–0.5)
IRON SATN MFR SERPL: 29 % (ref 20–50)
IRON SERPL-MCNC: 125 MCG/DL (ref 59–158)
LYMPHOCYTES # BLD AUTO: 1.03 10*3/MM3 (ref 0.7–3.1)
LYMPHOCYTES NFR BLD AUTO: 19.1 % (ref 19.6–45.3)
MCH RBC QN AUTO: 32 PG (ref 26.6–33)
MCHC RBC AUTO-ENTMCNC: 33.9 G/DL (ref 31.5–35.7)
MCV RBC AUTO: 94.4 FL (ref 79–97)
MONOCYTES # BLD AUTO: 0.48 10*3/MM3 (ref 0.1–0.9)
MONOCYTES NFR BLD AUTO: 8.9 % (ref 5–12)
NEUTROPHILS # BLD AUTO: 3.73 10*3/MM3 (ref 1.7–7)
NEUTROPHILS NFR BLD AUTO: 69.1 % (ref 42.7–76)
NRBC BLD AUTO-RTO: 0 /100 WBC (ref 0–0.2)
PLATELET # BLD AUTO: 283 10*3/MM3 (ref 140–450)
PSA SERPL-MCNC: 0.65 NG/ML (ref 0–4)
RBC # BLD AUTO: 4.09 10*6/MM3 (ref 4.14–5.8)
TIBC SERPL-MCNC: 438 MCG/DL
UIBC SERPL-MCNC: 313 MCG/DL (ref 112–346)
WBC # BLD AUTO: 5.39 10*3/MM3 (ref 3.4–10.8)

## 2023-10-06 RX ORDER — ERGOCALCIFEROL 1.25 MG/1
50000 CAPSULE ORAL 2 TIMES WEEKLY
Qty: 24 CAPSULE | Refills: 1 | Status: SHIPPED | OUTPATIENT
Start: 2023-10-09

## 2023-10-06 NOTE — TELEPHONE ENCOUNTER
Incoming Refill Request      Medication requested (name and dose): VITAMIN D2     Pharmacy where request should be sent: [facility] [address]    Additional details provided by patient:     Best call back number: [phone]     Does the patient have less than a 3 day supply:  [] Yes  [] No    Pradeep Rodriguez Rep  10/06/23, 14:15 EDT

## 2024-01-26 ENCOUNTER — OFFICE VISIT (OUTPATIENT)
Dept: ENDOCRINOLOGY | Age: 74
End: 2024-01-26
Payer: MEDICARE

## 2024-01-26 VITALS
TEMPERATURE: 97.7 F | SYSTOLIC BLOOD PRESSURE: 122 MMHG | BODY MASS INDEX: 34.38 KG/M2 | HEIGHT: 68 IN | WEIGHT: 226.85 LBS | HEART RATE: 76 BPM | OXYGEN SATURATION: 100 % | DIASTOLIC BLOOD PRESSURE: 62 MMHG

## 2024-01-26 DIAGNOSIS — E55.9 VITAMIN D DEFICIENCY: ICD-10-CM

## 2024-01-26 DIAGNOSIS — I65.22 STENOSIS OF LEFT CAROTID ARTERY: ICD-10-CM

## 2024-01-26 DIAGNOSIS — E78.5 HYPERLIPIDEMIA, UNSPECIFIED HYPERLIPIDEMIA TYPE: ICD-10-CM

## 2024-01-26 DIAGNOSIS — I10 ESSENTIAL HYPERTENSION: ICD-10-CM

## 2024-01-26 DIAGNOSIS — E11.9 TYPE 2 DIABETES MELLITUS WITHOUT COMPLICATION, WITHOUT LONG-TERM CURRENT USE OF INSULIN: Primary | ICD-10-CM

## 2024-01-26 RX ORDER — SEMAGLUTIDE 2.68 MG/ML
INJECTION, SOLUTION SUBCUTANEOUS
Qty: 9 ML | Refills: 2 | Status: SHIPPED | OUTPATIENT
Start: 2024-01-26

## 2024-01-26 RX ORDER — VITAMIN B COMPLEX
1 CAPSULE ORAL DAILY
COMMUNITY

## 2024-01-26 NOTE — PROGRESS NOTES
Kalie Morales is a 73 y.o. male.     History of Present Illness     He has known diabetes mellitus since 2012.  He is on Actoplus MET 15/850 1 tablet every morning and 2 tablets every evening and Ozempic 2 mg weekly.  He does not check his blood sugars at home.  He denies hypoglycemic episodes.  He has gained 5 pounds since 9/23  His last meal was last night.     His last eye examination was in 5/23.  He denies retinopathy.  He denies eye complaints.  Urine microalbumin was normal in 7/22.  He denies numbness, tingling or burning in his hands or feet.     He has hyperlipidemia and is on Crestor 20 mg/day, fenofibrate 145 mg/day, and Vascepa 2 g twice a day.  He denies myalgia.     He has hypertension and is on irbesartan 150 mg/day and amlodipine 2.5 mg/day.  He has mild to moderate aortic valve stenosis which is being monitored by Dr. Hand.  He has no history of heart attack or stroke.  He denies chest pain or shortness of breath.     He has carotid artery disease.  Ultrasound done in August 2022 showed 16 to 49% stenosis of the left internal carotid artery.  He is being monitored by Dr. Hand.  He is on aspirin     He has vitamin D deficiency and is on ergocalciferol 50,000 units twice a week.  He denies diarrhea.    He is a retired state employee.    The following portions of the patient's history were reviewed and updated as appropriate: allergies, current medications, past family history, past medical history, past social history, past surgical history, and problem list.    Review of Systems   Eyes:  Negative for visual disturbance.   Respiratory:  Negative for shortness of breath.    Cardiovascular:  Negative for chest pain and palpitations.   Gastrointestinal: Negative.    Genitourinary: Negative.    Musculoskeletal:  Negative for myalgias.   Neurological:  Negative for numbness.     Vitals:    01/26/24 1031   BP: 122/62   Pulse: 76   Temp: 97.7 °F (36.5 °C)   TempSrc: Temporal   SpO2: 100%  "  Weight: 103 kg (226 lb 13.6 oz)   Height: 172.7 cm (67.99\")      Objective   Physical Exam  Constitutional:       General: He is not in acute distress.     Appearance: Normal appearance. He is obese. He is not ill-appearing or toxic-appearing.   Eyes:      General: No scleral icterus.        Right eye: No discharge.         Left eye: No discharge.      Extraocular Movements: Extraocular movements intact.      Conjunctiva/sclera: Conjunctivae normal.   Neck:      Vascular: No carotid bruit.   Cardiovascular:      Rate and Rhythm: Normal rate and regular rhythm.      Heart sounds: Normal heart sounds. No murmur heard.  Pulmonary:      Breath sounds: Normal breath sounds. No rales.   Chest:      Chest wall: No tenderness.   Abdominal:      General: Bowel sounds are normal.      Palpations: Abdomen is soft.      Tenderness: There is no right CVA tenderness or left CVA tenderness.   Musculoskeletal:      Right lower leg: No edema.      Left lower leg: No edema.   Lymphadenopathy:      Cervical: No cervical adenopathy.   Skin:     General: Skin is warm.   Neurological:      Mental Status: He is alert.       Office Visit on 10/05/2023   Component Date Value Ref Range Status    WBC 10/05/2023 5.39  3.40 - 10.80 10*3/mm3 Final    RBC 10/05/2023 4.09 (L)  4.14 - 5.80 10*6/mm3 Final    Hemoglobin 10/05/2023 13.1  13.0 - 17.7 g/dL Final    Hematocrit 10/05/2023 38.6  37.5 - 51.0 % Final    MCV 10/05/2023 94.4  79.0 - 97.0 fL Final    MCH 10/05/2023 32.0  26.6 - 33.0 pg Final    MCHC 10/05/2023 33.9  31.5 - 35.7 g/dL Final    RDW 10/05/2023 13.3  12.3 - 15.4 % Final    Platelets 10/05/2023 283  140 - 450 10*3/mm3 Final    Neutrophil Rel % 10/05/2023 69.1  42.7 - 76.0 % Final    Lymphocyte Rel % 10/05/2023 19.1 (L)  19.6 - 45.3 % Final    Monocyte Rel % 10/05/2023 8.9  5.0 - 12.0 % Final    Eosinophil Rel % 10/05/2023 1.9  0.3 - 6.2 % Final    Basophil Rel % 10/05/2023 0.6  0.0 - 1.5 % Final    Neutrophils Absolute 10/05/2023 " 3.73  1.70 - 7.00 10*3/mm3 Final    Lymphocytes Absolute 10/05/2023 1.03  0.70 - 3.10 10*3/mm3 Final    Monocytes Absolute 10/05/2023 0.48  0.10 - 0.90 10*3/mm3 Final    Eosinophils Absolute 10/05/2023 0.10  0.00 - 0.40 10*3/mm3 Final    Basophils Absolute 10/05/2023 0.03  0.00 - 0.20 10*3/mm3 Final    Immature Granulocyte Rel % 10/05/2023 0.4  0.0 - 0.5 % Final    Immature Grans Absolute 10/05/2023 0.02  0.00 - 0.05 10*3/mm3 Final    nRBC 10/05/2023 0.0  0.0 - 0.2 /100 WBC Final    PSA 10/05/2023 0.653  0.000 - 4.000 ng/mL Final    Comment: Testing Method: [employer] Electrochemiluminescence  Immunoassay(ECLIA)  Values obtained with different assay methods or kits cannot  be used interchangeably.      Ferritin 10/05/2023 110.00  30.00 - 400.00 ng/mL Final    Results may be falsely decreased if patient taking Biotin.    TIBC 10/05/2023 438  mcg/dL Final    UIBC 10/05/2023 313  112 - 346 mcg/dL Final    Iron 10/05/2023 125  59 - 158 mcg/dL Final    Iron Saturation 10/05/2023 29  20 - 50 % Final     Assessment & Plan   Diagnoses and all orders for this visit:    1. Type 2 diabetes mellitus without complication, without long-term current use of insulin (Primary)  -     Microalbumin / Creatinine Urine Ratio - Urine, Clean Catch  -     Comprehensive Metabolic Panel  -     Hemoglobin A1c  -     Lipid Panel  -     TSH    2. Hyperlipidemia, unspecified hyperlipidemia type  -     Lipid Panel  -     TSH    3. Essential hypertension    4. Stenosis of left carotid artery    5. Vitamin D deficiency  -     Vitamin D,25-Hydroxy      Continue Actoplus MET and Ozempic.  Check hemoglobin A1c and urine microalbumin.    Continue Crestor 20 mg/day, fenofibrate 145 mg/day and Vascepa 2 g twice a day.    Continue irbesartan and amlodipine per JAVIER Valle and Dr. Hand.    Will defer follow-up of carotid stenosis to PCP and cardiologist.    Continue ergocalciferol 50,000 units twice a week.    Copy of my note sent to Terri Park,  PA.    RTC 6 mos

## 2024-01-27 LAB
25(OH)D3+25(OH)D2 SERPL-MCNC: 67 NG/ML (ref 30–100)
ALBUMIN SERPL-MCNC: 4.7 G/DL (ref 3.5–5.2)
ALBUMIN/CREAT UR: 8 MG/G CREAT (ref 0–29)
ALBUMIN/GLOB SERPL: 2.5 G/DL
ALP SERPL-CCNC: 43 U/L (ref 39–117)
ALT SERPL-CCNC: 21 U/L (ref 1–41)
AST SERPL-CCNC: 21 U/L (ref 1–40)
BILIRUB SERPL-MCNC: 0.8 MG/DL (ref 0–1.2)
BUN SERPL-MCNC: 13 MG/DL (ref 8–23)
BUN/CREAT SERPL: 14.9 (ref 7–25)
CALCIUM SERPL-MCNC: 9.5 MG/DL (ref 8.6–10.5)
CHLORIDE SERPL-SCNC: 98 MMOL/L (ref 98–107)
CHOLEST SERPL-MCNC: 120 MG/DL (ref 0–200)
CO2 SERPL-SCNC: 25.4 MMOL/L (ref 22–29)
CREAT SERPL-MCNC: 0.87 MG/DL (ref 0.76–1.27)
CREAT UR-MCNC: 84.5 MG/DL
EGFRCR SERPLBLD CKD-EPI 2021: 91.1 ML/MIN/1.73
GLOBULIN SER CALC-MCNC: 1.9 GM/DL
GLUCOSE SERPL-MCNC: 99 MG/DL (ref 65–99)
HBA1C MFR BLD: 5.9 % (ref 4.8–5.6)
HDLC SERPL-MCNC: 54 MG/DL (ref 40–60)
IMP & REVIEW OF LAB RESULTS: NORMAL
LDLC SERPL CALC-MCNC: 46 MG/DL (ref 0–100)
MICROALBUMIN UR-MCNC: 6.4 UG/ML
POTASSIUM SERPL-SCNC: 4.5 MMOL/L (ref 3.5–5.2)
PROT SERPL-MCNC: 6.6 G/DL (ref 6–8.5)
SODIUM SERPL-SCNC: 137 MMOL/L (ref 136–145)
TRIGL SERPL-MCNC: 111 MG/DL (ref 0–150)
TSH SERPL DL<=0.005 MIU/L-ACNC: 2.1 UIU/ML (ref 0.27–4.2)
VLDLC SERPL CALC-MCNC: 20 MG/DL (ref 5–40)

## 2024-01-28 DIAGNOSIS — E11.9 CONTROLLED TYPE 2 DIABETES MELLITUS WITHOUT COMPLICATION, WITHOUT LONG-TERM CURRENT USE OF INSULIN: ICD-10-CM

## 2024-01-28 RX ORDER — PIOGLITAZONE HCL AND METFORMIN HCL 500; 15 MG/1; MG/1
TABLET ORAL
Qty: 180 TABLET | Refills: 1 | Status: SHIPPED | OUTPATIENT
Start: 2024-01-28

## 2024-01-28 NOTE — PROGRESS NOTES
Hemoglobin A1c 5.9%.  Diabetes is well-controlled.  May decrease Actoplus MET 15/850 to 1 tablet twice a day.  Please notify patient of results and instructions.    Copy of labs sent to JAVIER Valle

## 2024-01-29 ENCOUNTER — TELEPHONE (OUTPATIENT)
Dept: ENDOCRINOLOGY | Age: 74
End: 2024-01-29
Payer: MEDICARE

## 2024-01-29 NOTE — TELEPHONE ENCOUNTER
Patient called back regarding results. Read Dr. Canchola's note and he said he saw it on my chart

## 2024-01-29 NOTE — TELEPHONE ENCOUNTER
1/29 called and lm for pt to call reg his labs   Ok for hub to read to patient   Please schedule labs if needed or follow ups  Ok for  to read to patient   Please schedule labs if needed or follow ups       ----- Message from Moses Canchola MD sent at 1/28/2024 12:38 PM EST -----  Hemoglobin A1c 5.9%.  Diabetes is well-controlled.  May decrease Actoplus MET 15/850 to 1 tablet twice a day.

## 2024-04-16 ENCOUNTER — OFFICE VISIT (OUTPATIENT)
Dept: FAMILY MEDICINE CLINIC | Facility: CLINIC | Age: 74
End: 2024-04-16
Payer: MEDICARE

## 2024-04-16 VITALS
BODY MASS INDEX: 33.95 KG/M2 | RESPIRATION RATE: 16 BRPM | OXYGEN SATURATION: 98 % | HEIGHT: 68 IN | HEART RATE: 71 BPM | DIASTOLIC BLOOD PRESSURE: 68 MMHG | SYSTOLIC BLOOD PRESSURE: 126 MMHG | WEIGHT: 224 LBS | TEMPERATURE: 97.4 F

## 2024-04-16 DIAGNOSIS — E61.1 LOW IRON: ICD-10-CM

## 2024-04-16 DIAGNOSIS — E55.9 VITAMIN D DEFICIENCY: ICD-10-CM

## 2024-04-16 DIAGNOSIS — Z12.5 SCREENING PSA (PROSTATE SPECIFIC ANTIGEN): ICD-10-CM

## 2024-04-16 DIAGNOSIS — I10 ESSENTIAL HYPERTENSION: Primary | ICD-10-CM

## 2024-04-16 DIAGNOSIS — I65.22 STENOSIS OF LEFT CAROTID ARTERY: ICD-10-CM

## 2024-04-16 DIAGNOSIS — E78.2 MIXED HYPERLIPIDEMIA: ICD-10-CM

## 2024-04-16 PROCEDURE — 1159F MED LIST DOCD IN RCRD: CPT | Performed by: PHYSICIAN ASSISTANT

## 2024-04-16 PROCEDURE — 3078F DIAST BP <80 MM HG: CPT | Performed by: PHYSICIAN ASSISTANT

## 2024-04-16 PROCEDURE — 99214 OFFICE O/P EST MOD 30 MIN: CPT | Performed by: PHYSICIAN ASSISTANT

## 2024-04-16 PROCEDURE — 1170F FXNL STATUS ASSESSED: CPT | Performed by: PHYSICIAN ASSISTANT

## 2024-04-16 PROCEDURE — 3044F HG A1C LEVEL LT 7.0%: CPT | Performed by: PHYSICIAN ASSISTANT

## 2024-04-16 PROCEDURE — 3074F SYST BP LT 130 MM HG: CPT | Performed by: PHYSICIAN ASSISTANT

## 2024-04-16 PROCEDURE — 1160F RVW MEDS BY RX/DR IN RCRD: CPT | Performed by: PHYSICIAN ASSISTANT

## 2024-04-16 RX ORDER — ICOSAPENT ETHYL 1000 MG/1
CAPSULE ORAL
Qty: 360 CAPSULE | Refills: 1 | Status: SHIPPED | OUTPATIENT
Start: 2024-04-16

## 2024-04-16 RX ORDER — ROSUVASTATIN CALCIUM 20 MG/1
20 TABLET, COATED ORAL DAILY
Qty: 90 TABLET | Refills: 1 | Status: SHIPPED | OUTPATIENT
Start: 2024-04-16

## 2024-04-16 RX ORDER — IRBESARTAN 150 MG/1
150 TABLET ORAL DAILY
Qty: 90 TABLET | Refills: 1 | Status: SHIPPED | OUTPATIENT
Start: 2024-04-16

## 2024-04-16 RX ORDER — AMLODIPINE BESYLATE 2.5 MG/1
2.5 TABLET ORAL DAILY
Qty: 90 TABLET | Refills: 1 | Status: SHIPPED | OUTPATIENT
Start: 2024-04-16

## 2024-04-16 RX ORDER — ERGOCALCIFEROL 1.25 MG/1
50000 CAPSULE ORAL 2 TIMES WEEKLY
Qty: 24 CAPSULE | Refills: 1 | Status: SHIPPED | OUTPATIENT
Start: 2024-04-18

## 2024-04-16 RX ORDER — FENOFIBRATE 145 MG/1
145 TABLET, COATED ORAL DAILY
Qty: 90 TABLET | Refills: 1 | Status: SHIPPED | OUTPATIENT
Start: 2024-04-16

## 2024-04-16 NOTE — PATIENT INSTRUCTIONS
Medicare Wellness  Personal Prevention Plan of Service     Date of Office Visit:    Encounter Provider:  Terri Park PA-C  Place of Service:  [facility]  Patient Name: Reji Morales  :  1950    As part of the Medicare Wellness portion of your visit today, we are providing you with this personalized preventive plan of services (PPPS). This plan is based upon recommendations of the [place] Preventive Services Task Force (USPSTF) and the Advisory Committee on Immunization Practices (ACIP).    This lists the preventive care services that should be considered, and provides dates of when you are due. Items listed as completed are up-to-date and do not require any further intervention.    Health Maintenance   Topic Date Due    ZOSTER VACCINE (1 of 2) Never done    TDAP/TD VACCINES (2 - Tdap) 2007    RSV Vaccine - Adults (1 - 1-dose 60+ series) Never done    HEPATITIS C SCREENING  Never done    DIABETIC FOOT EXAM  07/10/2021    COVID-19 Vaccine ( - -24 season) 2023    DIABETIC EYE EXAM  2024    HEMOGLOBIN A1C  2024    INFLUENZA VACCINE  2024    LIPID PANEL  2025    URINE MICROALBUMIN  2025    BMI FOLLOWUP  2025    ANNUAL WELLNESS VISIT  2025    COLORECTAL CANCER SCREENING  2025    Pneumococcal Vaccine 65+  Completed       Orders Placed This Encounter   Procedures    PSA Screen     Standing Status:   Future     Standing Expiration Date:   2025     Order Specific Question:   Release to patient     Answer:   Routine Release [[id number]]    Ferritin     Standing Status:   Future     Standing Expiration Date:   2025     Order Specific Question:   Release to patient     Answer:   Routine Release [[id number]]    Iron Profile     Standing Status:   Future     Standing Expiration Date:   2025     Order Specific Question:   Release to patient     Answer:   Routine Release [[id number]]    Vitamin B12      Standing Status:   Future     Standing Expiration Date:   4/16/2025     Order Specific Question:   Release to patient     Answer:   Routine Release [[id number]]    Folate     Standing Status:   Future     Standing Expiration Date:   4/16/2025     Order Specific Question:   Release to patient     Answer:   Routine Release [[id number]]    CBC & Differential     Standing Status:   Future     Standing Expiration Date:   4/16/2025     Order Specific Question:   Manual Differential     Answer:   No     Order Specific Question:   Release to patient     Answer:   Routine Release [[id number]]       No follow-ups on file.

## 2024-04-16 NOTE — PROGRESS NOTES
The ABCs of the Annual Wellness Visit  Subsequent Medicare Wellness Visit    Subjective      Reji Morales is a 74 y.o. male who presents for a Subsequent Medicare Wellness Visit.    The following portions of the patient's history were reviewed and   updated as appropriate: allergies, current medications, past family history, past medical history, past social history, past surgical history, and problem list.    Compared to one year ago, the patient feels his physical   health is the same.    Compared to one year ago, the patient feels his mental   health is the same.    Recent Hospitalizations:  He was not admitted to the hospital during the last year.       Current Medical Providers:  Patient Care Team:  Terri Park PA-C as PCP - General (Family Medicine)  Dimas Hampton MD as Consulting Physician (Urology)  Moses Canchola MD as Consulting Physician (Endocrinology)    Outpatient Medications Prior to Visit   Medication Sig Dispense Refill    amLODIPine (NORVASC) 2.5 MG tablet Take 1 tablet by mouth Daily. for blood pressure 90 tablet 1    aspirin 81 MG disintegrating tablet Take  by mouth.      B Complex Vitamins (vitamin b complex) capsule capsule Take 1 capsule by mouth Daily.      fenofibrate (TRICOR) 145 MG tablet Take 1 tablet by mouth Daily. For trigs 90 tablet 1    ferrous sulfate 325 (65 Fe) MG tablet Take 1 tablet by mouth. Every Other Day      irbesartan (AVAPRO) 150 MG tablet Take 1 tablet by mouth Daily. For BP 90 tablet 1    Misc Natural Products (IMMUNE FORMULA PO) Take  by mouth.      pioglitazone-metFORMIN (ACTOPLUS MET)  MG per tablet 1 tablet in the morning and 1 tablet in the evening 180 tablet 1    rosuvastatin (CRESTOR) 20 MG tablet Take 1 tablet by mouth Daily. For cholesterol 90 tablet 1    Semaglutide, 2 MG/DOSE, (Ozempic, 2 MG/DOSE,) 8 MG/3ML solution pen-injector 2 mg weekly subcutaneously 9 mL 2    Vascepa 1 g capsule capsule TAKE FOUR CAPSULES BY MOUTH EVERY DAY for  "trigs 360 capsule 1    vitamin b complex (TOTAL B/C) tablet tablet Take  by mouth.      vitamin D (ERGOCALCIFEROL) 1.25 MG (81913 UT) capsule capsule Take 1 capsule by mouth 2 (Two) Times a Week. 24 capsule 1     No facility-administered medications prior to visit.       No opioid medication identified on active medication list. I have reviewed chart for other potential  high risk medication/s and harmful drug interactions in the elderly.        Aspirin is on active medication list. Aspirin use is indicated based on review of current medical condition/s. Pros and cons of this therapy have been discussed today. Benefits of this medication outweigh potential harm.  Patient has been encouraged to continue taking this medication.  .      Patient Active Problem List   Diagnosis    Hyperlipidemia    Essential hypertension    Hypogonadism in male    Vitamin D deficiency    Low testosterone    Controlled type 2 diabetes mellitus without complication, without long-term current use of insulin    Benign non-nodular prostatic hyperplasia without lower urinary tract symptoms    Anemia    Nonrheumatic aortic valve stenosis    Stenosis of left carotid artery     Advance Care Planning   Advance Care Planning     Advance Directive is on file.  ACP discussion was held with the patient during this visit. Patient has an advance directive in EMR which is still valid.      Objective    Vitals:    04/16/24 1110   BP: 141/68   Pulse: 71   Resp: 16   Temp: 97.4 °F (36.3 °C)   SpO2: 98%   Weight: 102 kg (224 lb)   Height: 172.7 cm (67.99\")   PainSc: 0-No pain     Estimated body mass index is 34.07 kg/m² as calculated from the following:    Height as of this encounter: 172.7 cm (67.99\").    Weight as of this encounter: 102 kg (224 lb).           Does the patient have evidence of cognitive impairment?   No    Lab Results   Component Value Date    CHLPL 120 01/26/2024    TRIG 111 01/26/2024    HDL 54 01/26/2024    LDL 46 01/26/2024    VLDL 20 " 2024    HGBA1C 5.90 (H) 2024          HEALTH RISK ASSESSMENT    Smoking Status:  Social History     Tobacco Use   Smoking Status Never   Smokeless Tobacco Never     Alcohol Consumption:  Social History     Substance and Sexual Activity   Alcohol Use Yes    Comment: rare     Fall Risk Screen:    HENRIQUE Fall Risk Assessment was completed, and patient is at LOW risk for falls.Assessment completed on:2024    Depression Screenin/16/2024    11:00 AM   PHQ-2/PHQ-9 Depression Screening   Little Interest or Pleasure in Doing Things 0-->not at all   Feeling Down, Depressed or Hopeless 0-->not at all   PHQ-9: Brief Depression Severity Measure Score 0       Health Habits and Functional and Cognitive Screenin/16/2024    11:00 AM   Functional & Cognitive Status   Do you have difficulty preparing food and eating? No   Do you have difficulty bathing yourself, getting dressed or grooming yourself? No   Do you have difficulty using the toilet? No   Do you have difficulty moving around from place to place? No   Do you have trouble with steps or getting out of a bed or a chair? No   Current Diet Well Balanced Diet   Dental Exam Up to date   Eye Exam Up to date   Exercise (times per week) 7 times per week   Current Exercises Include Yard Work   Do you need help using the phone?  No   Are you deaf or do you have serious difficulty hearing?  No   Do you need help to go to places out of walking distance? No   Do you need help shopping? No   Do you need help preparing meals?  No   Do you need help with housework?  No   Do you need help with laundry? No   Do you need help taking your medications? No   Do you need help managing money? No   Do you ever drive or ride in a car without wearing a seat belt? No   Have you felt unusual stress, anger or loneliness in the last month? No   Who do you live with? Alone   If you need help, do you have trouble finding someone available to you? No   Have you been bothered  in the last four weeks by sexual problems? No   Do you have difficulty concentrating, remembering or making decisions? No       Age-appropriate Screening Schedule:  Refer to the list below for future screening recommendations based on patient's age, sex and/or medical conditions. Orders for these recommended tests are listed in the plan section. The patient has been provided with a written plan.    Health Maintenance   Topic Date Due    ZOSTER VACCINE (1 of 2) Never done    TDAP/TD VACCINES (2 - Tdap) 03/05/2007    RSV Vaccine - Adults (1 - 1-dose 60+ series) Never done    HEPATITIS C SCREENING  Never done    DIABETIC FOOT EXAM  07/10/2021    COVID-19 Vaccine (4 - 2023-24 season) 09/01/2023    DIABETIC EYE EXAM  05/30/2024    HEMOGLOBIN A1C  07/26/2024    INFLUENZA VACCINE  08/01/2024    LIPID PANEL  01/26/2025    URINE MICROALBUMIN  01/26/2025    BMI FOLLOWUP  01/26/2025    ANNUAL WELLNESS VISIT  04/16/2025    COLORECTAL CANCER SCREENING  07/28/2025    Pneumococcal Vaccine 65+  Completed                  CMS Preventative Services Quick Reference  Risk Factors Identified During Encounter:    Fall Risk-High or Moderate: Discussed Fall Prevention in the home    The above risks/problems have been discussed with the patient.  Pertinent information has been shared with the patient in the After Visit Summary.    There are no diagnoses linked to this encounter.    Follow Up:   Next Medicare Wellness visit to be scheduled in 1 year.      An After Visit Summary and PPPS were made available to the patient.

## 2024-04-16 NOTE — PROGRESS NOTES
"Kalie Morales is a 74 y.o. male.     Hypertension  Pertinent negatives include no blurred vision.   Hyperlipidemia    Diabetes  Pertinent negatives for hypoglycemia include no speech difficulty. Pertinent negatives for diabetes include no blurred vision.   Anemia        Since the last visit, he has overall felt well.  He has Primary Hypertension and well controlled on current medication, DMII managed by Endocrinologist, Hyperlipidemia with goals met with current Rx, and Vitamin D deficiency and labs are at goal >30 ng/mL.  he has been compliant with current medications have reviewed them.  The patient denies medication side effects.  Will refill medications. /68   Pulse 71   Temp 97.4 °F (36.3 °C)   Resp 16   Ht 172.7 cm (67.99\")   Wt 102 kg (224 lb)   SpO2 98%   BMI 34.07 kg/m² .      Walking--active      Results for orders placed or performed in visit on 01/26/24   Microalbumin / Creatinine Urine Ratio - Urine, Clean Catch    Specimen: Urine, Clean Catch   Result Value Ref Range    Creatinine, Urine 84.5 Not Estab. mg/dL    Microalbumin, Urine 6.4 Not Estab. ug/mL    Microalbumin/Creatinine Ratio 8 0 - 29 mg/g creat   Comprehensive Metabolic Panel    Specimen: Blood   Result Value Ref Range    Glucose 99 65 - 99 mg/dL    BUN 13 8 - 23 mg/dL    Creatinine 0.87 0.76 - 1.27 mg/dL    EGFR Result 91.1 >60.0 mL/min/1.73    BUN/Creatinine Ratio 14.9 7.0 - 25.0    Sodium 137 136 - 145 mmol/L    Potassium 4.5 3.5 - 5.2 mmol/L    Chloride 98 98 - 107 mmol/L    Total CO2 25.4 22.0 - 29.0 mmol/L    Calcium 9.5 8.6 - 10.5 mg/dL    Total Protein 6.6 6.0 - 8.5 g/dL    Albumin 4.7 3.5 - 5.2 g/dL    Globulin 1.9 gm/dL    A/G Ratio 2.5 g/dL    Total Bilirubin 0.8 0.0 - 1.2 mg/dL    Alkaline Phosphatase 43 39 - 117 U/L    AST (SGOT) 21 1 - 40 U/L    ALT (SGPT) 21 1 - 41 U/L   Hemoglobin A1c    Specimen: Blood   Result Value Ref Range    Hemoglobin A1C 5.90 (H) 4.80 - 5.60 %   Lipid Panel    Specimen: Blood "   Result Value Ref Range    Total Cholesterol 120 0 - 200 mg/dL    Triglycerides 111 0 - 150 mg/dL    HDL Cholesterol 54 40 - 60 mg/dL    VLDL Cholesterol Kurt 20 5 - 40 mg/dL    LDL Chol Calc (NIH) 46 0 - 100 mg/dL   TSH    Specimen: Blood   Result Value Ref Range    TSH 2.100 0.270 - 4.200 uIU/mL   Vitamin D,25-Hydroxy    Specimen: Blood   Result Value Ref Range    25 Hydroxy, Vitamin D 67.0 30.0 - 100.0 ng/ml   Cardiovascular Risk Assessment   Result Value Ref Range    Interpretation Note    Hemoglobin A1c 5.9%.  Diabetes is well-controlled.  May decrease Actoplus MET 15/850 to 1 tablet twice a day.  Please notify patient of results and instructions.    Last visit with endocrinology was 1/26/2024 also noted his eye exam was May 2023 and does have exam scheduled for next month.----Dr Canchola    I do need to order the carotid Doppler noting his carotid bruits on exam and on 2022 Doppler had plaque in the right internal carotid artery and mild stenosis on the left  Need records from 7/22/2019 that patient had been to urology Dr. Hampton noted nodular prostate and due to age follow-up as needed.   Saw cardio--Dr. Hand on 10/5/2023  SUMMARY/DISCUSSION  Hypertension blood pressure is little bit on the high side.  We did repeat it and remained at the 150/80 kelsey.  We will have him follow his blood pressure and report back in about 4 weeks to see how its overall doing.  He remains asymptomatic.  Mild to moderate aortic stenosis.  This is by echo on 8/9/2022.  He continues remain asymptomatic doing well with no issues.  Letter that I would not redo his echo this year I will follow-up with him in a year and consider it based on his symptoms and how he is doing and how his murmur sounds.  The following portions of the patient's history were reviewed and updated as appropriate: allergies, current medications, past family history, past medical history, past social history, past surgical history, and problem list.    Review  of Systems   Constitutional:  Negative for diaphoresis.   HENT:  Negative for nosebleeds and trouble swallowing.    Eyes:  Negative for blurred vision and visual disturbance.   Respiratory:  Negative for choking.    Gastrointestinal:  Negative for blood in stool.   Allergic/Immunologic: Negative for immunocompromised state.   Neurological:  Negative for facial asymmetry and speech difficulty.   Psychiatric/Behavioral:  Negative for self-injury and suicidal ideas.        Objective   Physical Exam  Vitals and nursing note reviewed.   Constitutional:       General: He is not in acute distress.     Appearance: He is well-developed. He is obese. He is not diaphoretic.   HENT:      Head: Normocephalic.   Eyes:      Conjunctiva/sclera: Conjunctivae normal.      Pupils: Pupils are equal, round, and reactive to light.   Neck:      Vascular: Carotid bruit present.   Cardiovascular:      Rate and Rhythm: Normal rate and regular rhythm.      Pulses: Normal pulses.      Heart sounds: Murmur heard.   Pulmonary:      Effort: Pulmonary effort is normal.      Breath sounds: Normal breath sounds. No rales.   Musculoskeletal:         General: Normal range of motion.      Cervical back: Normal range of motion and neck supple.      Right lower leg: Edema present.      Left lower leg: Edema present.   Skin:     General: Skin is warm and dry.      Findings: No rash.   Neurological:      Mental Status: He is alert and oriented to person, place, and time.   Psychiatric:         Mood and Affect: Mood normal. Affect is not inappropriate.         Behavior: Behavior normal.         Thought Content: Thought content normal.         Judgment: Judgment normal.           Assessment & Plan   Diagnoses and all orders for this visit:    1. Essential hypertension (Primary)  -     CBC & Differential; Future  -     PSA Screen; Future  -     Ferritin; Future  -     Iron Profile; Future  -     Vitamin B12; Future  -     Folate; Future    2. Stenosis of left  carotid artery  -     Duplex Carotid Ultrasound CAR  -     CBC & Differential; Future  -     PSA Screen; Future  -     Ferritin; Future  -     Iron Profile; Future  -     Vitamin B12; Future  -     Folate; Future    3. Vitamin D deficiency  -     CBC & Differential; Future  -     PSA Screen; Future  -     Ferritin; Future  -     Iron Profile; Future  -     Vitamin B12; Future  -     Folate; Future    4. Screening PSA (prostate specific antigen)  -     CBC & Differential; Future  -     PSA Screen; Future  -     Ferritin; Future  -     Iron Profile; Future  -     Vitamin B12; Future  -     Folate; Future    5. Low iron  -     CBC & Differential; Future  -     PSA Screen; Future  -     Ferritin; Future  -     Iron Profile; Future  -     Vitamin B12; Future  -     Folate; Future    6. Mixed hyperlipidemia  -     fenofibrate (TRICOR) 145 MG tablet; Take 1 tablet by mouth Daily. For trigs  Dispense: 90 tablet; Refill: 1  -     rosuvastatin (CRESTOR) 20 MG tablet; Take 1 tablet by mouth Daily. For cholesterol  Dispense: 90 tablet; Refill: 1  -     Vascepa 1 g capsule capsule; TAKE FOUR CAPSULES BY MOUTH EVERY DAY for trigs  Dispense: 360 capsule; Refill: 1    Other orders  -     Cancel: Vascular Screening (Carotid) CAR  -     amLODIPine (NORVASC) 2.5 MG tablet; Take 1 tablet by mouth Daily. for blood pressure  Dispense: 90 tablet; Refill: 1  -     irbesartan (AVAPRO) 150 MG tablet; Take 1 tablet by mouth Daily. For BP  Dispense: 90 tablet; Refill: 1      Labs Oct for me  Needs follow-up carotid Doppler has known mild carotid artery stenosis left and plaque on right  He is taking iron every other day I want him to do labs to check his blood count and iron labs in October along with a screening PSA   Consider RSV and Shingrix  Plan, Reji Morales, was seen today.  he was seen for HTN and continue medication, DMII followed by Endocrinologist, Hyperlipidemia and will continue current medication, and Vitamin D deficiency and  supplemented.  To see cardiology Dr. Hand yearly for the nonrheumatic aortic stenosis

## 2024-05-01 ENCOUNTER — HOSPITAL ENCOUNTER (OUTPATIENT)
Dept: CARDIOLOGY | Facility: HOSPITAL | Age: 74
Discharge: HOME | End: 2024-05-01
Admitting: PHYSICIAN ASSISTANT
Payer: MEDICARE

## 2024-05-01 LAB
BH CV XLRA MEAS LEFT DIST CCA EDV: -10.8 CM/SEC
BH CV XLRA MEAS LEFT DIST CCA PSV: -63.9 CM/SEC
BH CV XLRA MEAS LEFT DIST ICA EDV: -20.2 CM/SEC
BH CV XLRA MEAS LEFT DIST ICA PSV: -75.9 CM/SEC
BH CV XLRA MEAS LEFT ICA/CCA RATIO: 1.19
BH CV XLRA MEAS LEFT MID ICA EDV: -14.5 CM/SEC
BH CV XLRA MEAS LEFT MID ICA PSV: -61.9 CM/SEC
BH CV XLRA MEAS LEFT PROX CCA EDV: 18 CM/SEC
BH CV XLRA MEAS LEFT PROX CCA PSV: 96.3 CM/SEC
BH CV XLRA MEAS LEFT PROX ECA EDV: -14.1 CM/SEC
BH CV XLRA MEAS LEFT PROX ECA PSV: -111.3 CM/SEC
BH CV XLRA MEAS LEFT PROX ICA EDV: -16.5 CM/SEC
BH CV XLRA MEAS LEFT PROX ICA PSV: -76 CM/SEC
BH CV XLRA MEAS LEFT PROX SCLA PSV: 167 CM/SEC
BH CV XLRA MEAS LEFT VERTEBRAL A EDV: -16.2 CM/SEC
BH CV XLRA MEAS LEFT VERTEBRAL A PSV: -70.8 CM/SEC
BH CV XLRA MEAS RIGHT DIST CCA EDV: 15.9 CM/SEC
BH CV XLRA MEAS RIGHT DIST CCA PSV: 79 CM/SEC
BH CV XLRA MEAS RIGHT DIST ICA EDV: -15.4 CM/SEC
BH CV XLRA MEAS RIGHT DIST ICA PSV: -64.4 CM/SEC
BH CV XLRA MEAS RIGHT ICA/CCA RATIO: -1.01
BH CV XLRA MEAS RIGHT MID ICA EDV: -18 CM/SEC
BH CV XLRA MEAS RIGHT MID ICA PSV: -60.1 CM/SEC
BH CV XLRA MEAS RIGHT PROX CCA EDV: 15.4 CM/SEC
BH CV XLRA MEAS RIGHT PROX CCA PSV: 87.8 CM/SEC
BH CV XLRA MEAS RIGHT PROX ECA EDV: 21.2 CM/SEC
BH CV XLRA MEAS RIGHT PROX ECA PSV: 109 CM/SEC
BH CV XLRA MEAS RIGHT PROX ICA EDV: -18.7 CM/SEC
BH CV XLRA MEAS RIGHT PROX ICA PSV: -79.6 CM/SEC
BH CV XLRA MEAS RIGHT PROX SCLA PSV: 495.8 CM/SEC
BH CV XLRA MEAS RIGHT VERTEBRAL A EDV: 16.8 CM/SEC
BH CV XLRA MEAS RIGHT VERTEBRAL A PSV: 51.6 CM/SEC

## 2024-05-01 PROCEDURE — 93880 EXTRACRANIAL BILAT STUDY: CPT

## 2024-07-26 ENCOUNTER — OFFICE VISIT (OUTPATIENT)
Dept: ENDOCRINOLOGY | Age: 74
End: 2024-07-26
Payer: MEDICARE

## 2024-07-26 VITALS
OXYGEN SATURATION: 100 % | SYSTOLIC BLOOD PRESSURE: 124 MMHG | HEIGHT: 68 IN | HEART RATE: 65 BPM | TEMPERATURE: 97.2 F | WEIGHT: 221.8 LBS | DIASTOLIC BLOOD PRESSURE: 58 MMHG | BODY MASS INDEX: 33.62 KG/M2

## 2024-07-26 DIAGNOSIS — E55.9 VITAMIN D DEFICIENCY: ICD-10-CM

## 2024-07-26 DIAGNOSIS — E11.9 TYPE 2 DIABETES MELLITUS WITHOUT COMPLICATION, WITHOUT LONG-TERM CURRENT USE OF INSULIN: Primary | ICD-10-CM

## 2024-07-26 DIAGNOSIS — E78.5 HYPERLIPIDEMIA, UNSPECIFIED HYPERLIPIDEMIA TYPE: ICD-10-CM

## 2024-07-26 DIAGNOSIS — I10 ESSENTIAL HYPERTENSION: ICD-10-CM

## 2024-07-26 DIAGNOSIS — I65.23 BILATERAL CAROTID ARTERY STENOSIS: ICD-10-CM

## 2024-07-26 NOTE — PROGRESS NOTES
Kalie Morales is a 74 y.o. male.     History of Present Illness        He has known diabetes mellitus since 2012.  He is on Actoplus MET 15/850 1 tablet every morning and 1 tablets every evening and Ozempic 2 mg weekly.  He does not check his blood sugars at home.  He denies hypoglycemic episodes.  He has lost 5 pounds since January 2024.  His last meal was last night.     His last eye examination was in 5/24.  He has no retinopathy.  He denies eye complaints.  Urine microalbumin was normal in 1/24.  He denies numbness, tingling or burning in his hands or feet.     He has hyperlipidemia and is on Crestor 20 mg/day, fenofibrate 145 mg/day, and Vascepa 2 g twice a day.  He denies myalgia.     He has hypertension and is on irbesartan 150 mg/day and amlodipine 2.5 mg/day.  He has mild to moderate aortic valve stenosis which is being monitored by Dr. Hand.  He has no history of heart attack or stroke.  He denies chest pain or shortness of breath.     He has carotid artery disease.  Ultrasound done in May 2024 showed less than 50% stenosis of both internal carotid artery and right subclavian stenosis.  He denies arm pain.  He is being monitored by Dr. Hand.  He is on aspirin     He has vitamin D deficiency and is on ergocalciferol 50,000 units twice a week.  He denies diarrhea.     He is a retired state employee.    The following portions of the patient's history were reviewed and updated as appropriate: allergies, current medications, past family history, past medical history, past social history, past surgical history, and problem list.    Review of Systems   Eyes:  Negative for visual disturbance.   Respiratory:  Negative for shortness of breath and wheezing.    Cardiovascular:  Negative for chest pain and palpitations.   Gastrointestinal: Negative.    Genitourinary: Negative.    Musculoskeletal:  Negative for myalgias.   Neurological:  Negative for numbness.     Vitals:    07/26/24 1025   BP: 124/58  "  Pulse: 65   Temp: 97.2 °F (36.2 °C)   TempSrc: Temporal   SpO2: 100%   Weight: 101 kg (221 lb 12.8 oz)   Height: 172.7 cm (67.99\")      Objective   Physical Exam  Constitutional:       General: He is not in acute distress.     Appearance: Normal appearance. He is obese. He is not ill-appearing, toxic-appearing or diaphoretic.   Eyes:      General: No scleral icterus.        Right eye: No discharge.         Left eye: No discharge.      Extraocular Movements: Extraocular movements intact.   Neck:      Vascular: No carotid bruit.   Cardiovascular:      Rate and Rhythm: Normal rate and regular rhythm.      Heart sounds: Normal heart sounds. No murmur heard.     No friction rub.   Pulmonary:      Breath sounds: Normal breath sounds. No rales.   Chest:      Chest wall: No tenderness.   Abdominal:      Palpations: Abdomen is soft.      Tenderness: There is no right CVA tenderness or left CVA tenderness.   Musculoskeletal:      Comments: Feet warm.  No cyanosis or clubbing.  Trace leg edema.  No plantar ulcers.   Lymphadenopathy:      Cervical: No cervical adenopathy.   Neurological:      Mental Status: He is alert and oriented to person, place, and time.      Comments: Intact light touch in lower extremities.       Office Visit on 04/16/2024   Component Date Value Ref Range Status    Prox CCA PSV 05/01/2024 87.8  cm/sec Final    Prox CCA EDV 05/01/2024 15.4  cm/sec Final    Dist CCA PSV 05/01/2024 79.0  cm/sec Final    Dist CCA EDV 05/01/2024 15.9  cm/sec Final    Prox ICA PSV 05/01/2024 -79.6  cm/sec Final    Prox ICA EDV 05/01/2024 -18.7  cm/sec Final    Mid ICA PSV 05/01/2024 -60.1  cm/sec Final    Mid ICA EDV 05/01/2024 -18.0  cm/sec Final    Dist ICA PSV 05/01/2024 -64.4  cm/sec Final    Dist ICA EDV 05/01/2024 -15.4  cm/sec Final    Prox ECA PSV 05/01/2024 109.0  cm/sec Final    Prox ECA EDV 05/01/2024 21.2  cm/sec Final    Vertebral A PSV 05/01/2024 51.6  cm/sec Final    Vertebral A EDV 05/01/2024 16.8  cm/sec " Final    Prox SCLA PSV 05/01/2024 495.8  cm/sec Final    ICA/CCA ratio 05/01/2024 -1.01   Final    Prox CCA PSV 05/01/2024 96.3  cm/sec Final    Prox CCA EDV 05/01/2024 18.0  cm/sec Final    Dist CCA PSV 05/01/2024 -63.9  cm/sec Final    Dist CCA EDV 05/01/2024 -10.8  cm/sec Final    Prox ICA PSV 05/01/2024 -76.0  cm/sec Final    Prox ICA EDV 05/01/2024 -16.5  cm/sec Final    Mid ICA PSV 05/01/2024 -61.9  cm/sec Final    Mid ICA EDV 05/01/2024 -14.5  cm/sec Final    Dist ICA PSV 05/01/2024 -75.9  cm/sec Final    Dist ICA EDV 05/01/2024 -20.2  cm/sec Final    Prox ECA PSV 05/01/2024 -111.3  cm/sec Final    Prox ECA EDV 05/01/2024 -14.1  cm/sec Final    Vertebral A PSV 05/01/2024 -70.8  cm/sec Final    Vertebral A EDV 05/01/2024 -16.2  cm/sec Final    Prox SCLA PSV 05/01/2024 167.0  cm/sec Final    ICA/CCA ratio 05/01/2024 1.19   Final     Assessment & Plan   Diagnoses and all orders for this visit:    1. Type 2 diabetes mellitus without complication, without long-term current use of insulin (Primary)  -     Comprehensive Metabolic Panel  -     Urinalysis With Microscopic If Indicated (No Culture) - Urine, Clean Catch  -     Hemoglobin A1c  -     Lipid Panel  -     TSH Rfx On Abnormal To Free T4    2. Hyperlipidemia, unspecified hyperlipidemia type  -     Lipid Panel  -     TSH Rfx On Abnormal To Free T4    3. Essential hypertension    4. Bilateral carotid artery stenosis    5. Vitamin D deficiency  -     Vitamin D,25-Hydroxy      Continue Ozempic 2 mg weekly.  Continue Actoplus MET 15/850 mg twice daily.  Consider discontinuing Actos which may help with leg edema.    Continue Crestor 20 mg/day, fenofibrate 145 mg/day, and Vascepa 2 g twice a day.    Continue irbesartan and amlodipine per Dr. Hand.    Continue monitoring carotid artery periodically.    Continue ergocalciferol 50,000 units twice a week.    Flu vaccine this fall.    Copy of my note sent to J[facility]IER Valle and Dr. Hand.    Follow-up in 6  months.

## 2024-07-27 LAB
25(OH)D3+25(OH)D2 SERPL-MCNC: 54.8 NG/ML (ref 30–100)
ALBUMIN SERPL-MCNC: 4.4 G/DL (ref 3.5–5.2)
ALBUMIN/GLOB SERPL: 2.2 G/DL
ALP SERPL-CCNC: 41 U/L (ref 39–117)
ALT SERPL-CCNC: 19 U/L (ref 1–41)
APPEARANCE UR: CLEAR
AST SERPL-CCNC: 20 U/L (ref 1–40)
BILIRUB SERPL-MCNC: 0.8 MG/DL (ref 0–1.2)
BILIRUB UR QL STRIP: NEGATIVE
BUN SERPL-MCNC: 14 MG/DL (ref 8–23)
BUN/CREAT SERPL: 16.1 (ref 7–25)
CALCIUM SERPL-MCNC: 9.3 MG/DL (ref 8.6–10.5)
CHLORIDE SERPL-SCNC: 101 MMOL/L (ref 98–107)
CHOLEST SERPL-MCNC: 109 MG/DL (ref 0–200)
CO2 SERPL-SCNC: 26.1 MMOL/L (ref 22–29)
COLOR UR: ABNORMAL
CREAT SERPL-MCNC: 0.87 MG/DL (ref 0.76–1.27)
EGFRCR SERPLBLD CKD-EPI 2021: 90.5 ML/MIN/1.73
GLOBULIN SER CALC-MCNC: 2 GM/DL
GLUCOSE SERPL-MCNC: 97 MG/DL (ref 65–99)
GLUCOSE UR QL STRIP: NEGATIVE
HBA1C MFR BLD: 5.8 % (ref 4.8–5.6)
HDLC SERPL-MCNC: 53 MG/DL (ref 40–60)
HGB UR QL STRIP: NEGATIVE
IMP & REVIEW OF LAB RESULTS: NORMAL
KETONES UR QL STRIP: NEGATIVE
LDLC SERPL CALC-MCNC: 40 MG/DL (ref 0–100)
LEUKOCYTE ESTERASE UR QL STRIP: NEGATIVE
NITRITE UR QL STRIP: NEGATIVE
PH UR STRIP: 6.5 [PH] (ref 5–8)
POTASSIUM SERPL-SCNC: 5.1 MMOL/L (ref 3.5–5.2)
PROT SERPL-MCNC: 6.4 G/DL (ref 6–8.5)
PROT UR QL STRIP: NEGATIVE
SODIUM SERPL-SCNC: 136 MMOL/L (ref 136–145)
SP GR UR STRIP: 1.02 (ref 1–1.03)
TRIGL SERPL-MCNC: 78 MG/DL (ref 0–150)
TSH SERPL DL<=0.005 MIU/L-ACNC: 2.06 UIU/ML (ref 0.27–4.2)
UROBILINOGEN UR STRIP-MCNC: ABNORMAL MG/DL
VLDLC SERPL CALC-MCNC: 16 MG/DL (ref 5–40)

## 2024-07-28 RX ORDER — METFORMIN HYDROCHLORIDE 500 MG/1
TABLET, EXTENDED RELEASE ORAL
Qty: 360 TABLET | Refills: 2 | Status: SHIPPED | OUTPATIENT
Start: 2024-07-28

## 2024-07-28 NOTE — PROGRESS NOTES
Normal urinalysis.  Hemoglobin A1c 5.8%.  Diabetes is well-controlled.  Finish up current supply of Actoplus MET 15/850 mg and discontinue.  Switch to metformin  mg 2 tablets twice a day.  Prescription sent to pharmacy.  LDL 40.  HDL 53.  Triglycerides 78.  Continue Crestor 20 mg/day and Vascepa 2 g twice a day.  Finish up current supply of fenofibrate and then discontinue.  Normal thyroid function test.  Normal vitamin D.  On ergocalciferol 50,000 units once a week.  Copy of labs sent to JAVIER Valle.  Please notify patient of results and instructions.

## 2024-07-29 ENCOUNTER — TELEPHONE (OUTPATIENT)
Dept: ENDOCRINOLOGY | Age: 74
End: 2024-07-29
Payer: MEDICARE

## 2024-07-29 NOTE — TELEPHONE ENCOUNTER
7/29 called and lm for pt to call reg his labs   Ok for hub to read to patient   Please schedule labs if needed or follow ups  Ok for  to read to patient   Please schedule labs if needed or follow ups       ----- Message from Moses Canchola sent at 7/28/2024  5:25 PM EDT -----  Normal urinalysis.  Hemoglobin A1c 5.8%.  Diabetes is well-controlled.  Finish up current supply of Actoplus MET 15/850 mg and discontinue.  Switch to metformin  mg 2 tablets twice a day.  Prescription sent to pharmacy.  LDL 40.  HDL 53.  Triglycerides 78.  Continue Crestor 20 mg/day and Vascepa 2 g twice a day.  Finish up current supply of fenofibrate and then discontinue.  Normal thyroid function test.  Normal vitamin D.  On ergocalciferol 50,000 units once a week.

## 2024-07-29 NOTE — TELEPHONE ENCOUNTER
Patient was returning Faith's call. He requested a ADINCON message with results, so I reviewed and sent over that message to patient. Patient understood, no questions

## 2024-09-18 DIAGNOSIS — E78.2 MIXED HYPERLIPIDEMIA: ICD-10-CM

## 2024-09-18 RX ORDER — IRBESARTAN 150 MG/1
150 TABLET ORAL DAILY
Qty: 90 TABLET | Refills: 0 | Status: SHIPPED | OUTPATIENT
Start: 2024-09-18

## 2024-09-18 RX ORDER — ICOSAPENT ETHYL 1000 MG/1
CAPSULE ORAL
Qty: 360 CAPSULE | Refills: 1 | Status: SHIPPED | OUTPATIENT
Start: 2024-09-18

## 2024-09-18 RX ORDER — AMLODIPINE BESYLATE 2.5 MG/1
2.5 TABLET ORAL DAILY
Qty: 90 TABLET | Refills: 0 | Status: SHIPPED | OUTPATIENT
Start: 2024-09-18

## 2024-09-18 RX ORDER — ROSUVASTATIN CALCIUM 20 MG/1
20 TABLET, COATED ORAL DAILY
Qty: 90 TABLET | Refills: 0 | Status: SHIPPED | OUTPATIENT
Start: 2024-09-18

## 2024-09-19 DIAGNOSIS — E55.9 VITAMIN D DEFICIENCY: ICD-10-CM

## 2024-09-19 RX ORDER — ERGOCALCIFEROL 1.25 MG/1
50000 CAPSULE, LIQUID FILLED ORAL 2 TIMES WEEKLY
Qty: 24 CAPSULE | Refills: 1 | Status: SHIPPED | OUTPATIENT
Start: 2024-09-19

## 2024-10-08 ENCOUNTER — OFFICE VISIT (OUTPATIENT)
Dept: CARDIOLOGY | Facility: CLINIC | Age: 74
End: 2024-10-08
Payer: MEDICARE

## 2024-10-08 VITALS
WEIGHT: 221 LBS | BODY MASS INDEX: 33.49 KG/M2 | HEART RATE: 65 BPM | DIASTOLIC BLOOD PRESSURE: 76 MMHG | HEIGHT: 68 IN | SYSTOLIC BLOOD PRESSURE: 138 MMHG

## 2024-10-08 DIAGNOSIS — I10 ESSENTIAL HYPERTENSION: ICD-10-CM

## 2024-10-08 DIAGNOSIS — I35.0 NONRHEUMATIC AORTIC VALVE STENOSIS: Primary | ICD-10-CM

## 2024-10-08 NOTE — PROGRESS NOTES
"      CARDIOLOGY    Devan Hand MD    ENCOUNTER DATE:  10/08/2024    Reji Morales / 74 y.o. / male        CHIEF COMPLAINT / REASON FOR OFFICE VISIT     Nonrheumatic aortic valve stenosis (10/05/2023 Follow up)  Hypertension    HISTORY OF PRESENT ILLNESS       HPI  Reji Morales is a 74 y.o. male who presents today for reevaluation.  Clinically is doing well he has no complaints this year.  No chest pain shortness of breath or any other issues.      The following portions of the patient's history were reviewed and updated as appropriate: allergies, current medications, past family history, past medical history, past social history, past surgical history and problem list.      VITAL SIGNS     Visit Vitals  /76 (BP Location: Left arm)   Pulse 65   Ht 172.7 cm (68\")   Wt 100 kg (221 lb)   BMI 33.60 kg/m²         Wt Readings from Last 3 Encounters:   10/08/24 100 kg (221 lb)   07/26/24 101 kg (221 lb 12.8 oz)   04/16/24 102 kg (224 lb)     Body mass index is 33.6 kg/m².      REVIEW OF SYSTEMS   ROS        PHYSICAL EXAMINATION     Vitals reviewed.   Constitutional:       Appearance: Healthy appearance.   Pulmonary:      Effort: Pulmonary effort is normal.   Cardiovascular:      PMI at left midclavicular line. Normal rate. Regular rhythm. Normal S1. Normal S2.       Murmurs: There is a grade 2/6 harsh midsystolic murmur at the URSB.      No gallop.  No click. No rub.   Pulses:     Intact distal pulses.   Edema:     Peripheral edema absent.   Neurological:      Mental Status: Alert.           REVIEWED DATA       ECG 12 Lead    Date/Time: 10/8/2024 2:58 PM  Performed by: Devan Hand MD    Authorized by: Devan Hand MD  Comparison: compared with previous ECG from 10/8/2024  Similar to previous ECG  Rhythm: sinus rhythm  Other findings: early repolarization    Clinical impression: non-specific ECG          Cardiac Procedures:      Lipid Panel          1/26/2024    11:15 7/26/2024    11:38   Lipid " Panel   Total Cholesterol 120  109    Triglycerides 111  78    HDL Cholesterol 54  53    VLDL Cholesterol 20  16    LDL Cholesterol  46  40          ASSESSMENT & PLAN      Diagnosis Plan   1. Nonrheumatic aortic valve stenosis  Adult Transthoracic Echo Complete W/ Cont if Necessary Per Protocol      2. Essential hypertension              SUMMARY/DISCUSSION  Hypertension blood pressure is good  Aortic valve stenosis.  Patient remains asymptomatic.  Will repeat his echo in about a year which will be 3 years since his last.  Follow-up in 1 year or sooner if issues occur.        MEDICATIONS         Discharge Medications            Accurate as of October 8, 2024  2:57 PM. If you have any questions, ask your nurse or doctor.                Continue These Medications        Instructions Start Date   amLODIPine 2.5 MG tablet  Commonly known as: NORVASC   2.5 mg, Oral, Daily      aspirin 81 MG disintegrating tablet   Oral      ferrous sulfate 325 (65 Fe) MG tablet   1 tablet, Oral, Every Other Day      IMMUNE FORMULA PO   Oral      irbesartan 150 MG tablet  Commonly known as: AVAPRO   150 mg, Oral, Daily      metFORMIN  MG 24 hr tablet  Commonly known as: GLUCOPHAGE-XR   2 tablets twice a day with meals      Ozempic (2 MG/DOSE) 8 MG/3ML solution pen-injector  Generic drug: Semaglutide (2 MG/DOSE)   2 mg weekly subcutaneously      rosuvastatin 20 MG tablet  Commonly known as: CRESTOR   20 mg, Oral, Daily      Vascepa 1 g capsule capsule  Generic drug: icosapent ethyl   TAKE FOUR CAPSULES BY MOUTH EVERY DAY      vitamin b complex capsule capsule   1 capsule, Oral, Daily      vitamin D 1.25 MG (25249 UT) capsule capsule  Commonly known as: ERGOCALCIFEROL   50,000 Units, Oral, 2 Times Weekly                   **Dragon Disclaimer:   Much of this encounter note is an electronic transcription/translation of spoken language to printed text. The electronic translation of spoken language may permit erroneous, or at times,  nonsensical words or phrases to be inadvertently transcribed. Although I have reviewed the note for such errors, some may still exist.

## 2024-10-16 ENCOUNTER — OFFICE VISIT (OUTPATIENT)
Dept: FAMILY MEDICINE CLINIC | Facility: CLINIC | Age: 74
End: 2024-10-16
Payer: MEDICARE

## 2024-10-16 VITALS
HEART RATE: 70 BPM | OXYGEN SATURATION: 100 % | RESPIRATION RATE: 16 BRPM | DIASTOLIC BLOOD PRESSURE: 68 MMHG | TEMPERATURE: 97.3 F | BODY MASS INDEX: 33.95 KG/M2 | HEIGHT: 68 IN | SYSTOLIC BLOOD PRESSURE: 130 MMHG | WEIGHT: 224 LBS

## 2024-10-16 DIAGNOSIS — I35.0 NONRHEUMATIC AORTIC VALVE STENOSIS: Chronic | ICD-10-CM

## 2024-10-16 DIAGNOSIS — E11.9 TYPE 2 DIABETES MELLITUS WITHOUT COMPLICATION, WITHOUT LONG-TERM CURRENT USE OF INSULIN: Chronic | ICD-10-CM

## 2024-10-16 DIAGNOSIS — I65.23 BILATERAL CAROTID ARTERY STENOSIS: Chronic | ICD-10-CM

## 2024-10-16 DIAGNOSIS — E55.9 VITAMIN D DEFICIENCY: Chronic | ICD-10-CM

## 2024-10-16 DIAGNOSIS — I77.1 SUBCLAVIAN ARTERY STENOSIS, RIGHT: Chronic | ICD-10-CM

## 2024-10-16 DIAGNOSIS — E78.2 MIXED HYPERLIPIDEMIA: Chronic | ICD-10-CM

## 2024-10-16 DIAGNOSIS — I10 ESSENTIAL HYPERTENSION: Chronic | ICD-10-CM

## 2024-10-16 DIAGNOSIS — Z23 IMMUNIZATION DUE: Primary | ICD-10-CM

## 2024-10-16 DIAGNOSIS — I65.23 CAROTID STENOSIS, ASYMPTOMATIC, BILATERAL: Chronic | ICD-10-CM

## 2024-10-16 PROCEDURE — 99214 OFFICE O/P EST MOD 30 MIN: CPT | Performed by: PHYSICIAN ASSISTANT

## 2024-10-16 PROCEDURE — 1160F RVW MEDS BY RX/DR IN RCRD: CPT | Performed by: PHYSICIAN ASSISTANT

## 2024-10-16 PROCEDURE — 3075F SYST BP GE 130 - 139MM HG: CPT | Performed by: PHYSICIAN ASSISTANT

## 2024-10-16 PROCEDURE — 90662 IIV NO PRSV INCREASED AG IM: CPT | Performed by: PHYSICIAN ASSISTANT

## 2024-10-16 PROCEDURE — 1126F AMNT PAIN NOTED NONE PRSNT: CPT | Performed by: PHYSICIAN ASSISTANT

## 2024-10-16 PROCEDURE — 3078F DIAST BP <80 MM HG: CPT | Performed by: PHYSICIAN ASSISTANT

## 2024-10-16 PROCEDURE — G0008 ADMIN INFLUENZA VIRUS VAC: HCPCS | Performed by: PHYSICIAN ASSISTANT

## 2024-10-16 PROCEDURE — 3044F HG A1C LEVEL LT 7.0%: CPT | Performed by: PHYSICIAN ASSISTANT

## 2024-10-16 PROCEDURE — 1159F MED LIST DOCD IN RCRD: CPT | Performed by: PHYSICIAN ASSISTANT

## 2024-10-16 RX ORDER — AMLODIPINE BESYLATE 2.5 MG/1
2.5 TABLET ORAL DAILY
Qty: 90 TABLET | Refills: 1 | Status: SHIPPED | OUTPATIENT
Start: 2024-10-16

## 2024-10-16 RX ORDER — ROSUVASTATIN CALCIUM 20 MG/1
20 TABLET, COATED ORAL DAILY
Qty: 90 TABLET | Refills: 3 | Status: SHIPPED | OUTPATIENT
Start: 2024-10-16

## 2024-10-16 RX ORDER — IRBESARTAN 150 MG/1
150 TABLET ORAL DAILY
Qty: 90 TABLET | Refills: 1 | Status: SHIPPED | OUTPATIENT
Start: 2024-10-16

## 2024-10-16 NOTE — PROGRESS NOTES
"Kalie Morales is a 74 y.o. male.     Hypertension  Pertinent negatives include no blurred vision.       Since the last visit, he has overall felt well.  He has Primary Hypertension and well controlled on current medication, DMII managed by Endocrinologist, Hyperlipidemia with goals met with current Rx, and Vitamin D deficiency and labs are at goal >30 ng/mL.  he has been compliant with current medications have reviewed them.  The patient denies medication side effects.  Will refill medications. /68   Pulse 70   Temp 97.3 °F (36.3 °C)   Resp 16   Ht 172.7 cm (68\")   Wt 102 kg (224 lb)   SpO2 100%   BMI 34.06 kg/m² .      Walking ---yardwork  Just updated PSA la   I noted that Dr. Canchola stopped his Actos and his pedal edema is resolved.  Results for orders placed or performed in visit on 10/06/24   PSA Screen    Collection Time: 10/09/24  9:20 AM    Specimen: Blood   Result Value Ref Range    PSA 0.7 0.0 - 4.0 ng/mL   Ferritin    Collection Time: 10/09/24  9:20 AM    Specimen: Blood   Result Value Ref Range    Ferritin 196 30 - 400 ng/mL   Iron Profile    Collection Time: 10/09/24  9:20 AM    Specimen: Blood   Result Value Ref Range    TIBC 406 250 - 450 ug/dL    UIBC 301 111 - 343 ug/dL    Iron 105 38 - 169 ug/dL    Iron Saturation 26 15 - 55 %   Vitamin B12    Collection Time: 10/09/24  9:20 AM    Specimen: Blood   Result Value Ref Range    Vitamin B-12 705 232 - 1,245 pg/mL   Folate    Collection Time: 10/09/24  9:20 AM    Specimen: Blood   Result Value Ref Range    Folate >20.0 >3.0 ng/mL   CBC & Differential    Collection Time: 10/09/24  9:20 AM    Specimen: Blood   Result Value Ref Range    WBC 9.2 3.4 - 10.8 x10E3/uL    RBC 4.83 4.14 - 5.80 x10E6/uL    Hemoglobin 15.6 13.0 - 17.7 g/dL    Hematocrit 47.3 37.5 - 51.0 %    MCV 98 (H) 79 - 97 fL    MCH 32.3 26.6 - 33.0 pg    MCHC 33.0 31.5 - 35.7 g/dL    RDW 12.3 11.6 - 15.4 %    Platelets 276 150 - 450 x10E3/uL    Neutrophil Rel % 73 Not " Estab. %    Lymphocyte Rel % 16 Not Estab. %    Monocyte Rel % 8 Not Estab. %    Eosinophil Rel % 2 Not Estab. %    Basophil Rel % 1 Not Estab. %    Neutrophils Absolute 6.6 1.4 - 7.0 x10E3/uL    Lymphocytes Absolute 1.5 0.7 - 3.1 x10E3/uL    Monocytes Absolute 0.8 0.1 - 0.9 x10E3/uL    Eosinophils Absolute 0.2 0.0 - 0.4 x10E3/uL    Basophils Absolute 0.1 0.0 - 0.2 x10E3/uL    Immature Granulocyte Rel % 0 Not Estab. %    Immature Grans Absolute 0.0 0.0 - 0.1 x10E3/uL   Had carotid Doppler on 5/1/2024 and noted mild carotid artery stenosis bilateral and right subclavian artery stenosis but normal blood flow  Had murmur and did echo--abn ---saw Dr Hand 10-8-24--- and does have diagnosis of aortic valve stenosis nonrheumatic and follow-up 1 year also repeating echo in October    Saw DR Canchola 7-26-24 --- continued his Ozempic,   metformin, Crestor, vitamin D--stopped his Actos    I do have him on iron QOD last labs were in April and noted that serum iron was fantastic... Normal hemoglobin and continue every other day iron.   Need records from 7/22/2019 that patient had been to urology Dr. Hampton noted nodular prostate and due to age follow-up as needed.       The following portions of the patient's history were reviewed and updated as appropriate: allergies, current medications, past family history, past medical history, past social history, past surgical history, and problem list.    Review of Systems   Constitutional:  Negative for diaphoresis.   HENT:  Negative for nosebleeds and trouble swallowing.    Eyes:  Negative for blurred vision and visual disturbance.   Respiratory:  Negative for choking.    Gastrointestinal:  Negative for blood in stool.   Allergic/Immunologic: Negative for immunocompromised state.   Neurological:  Negative for facial asymmetry and speech difficulty.   Psychiatric/Behavioral:  Negative for self-injury and suicidal ideas.        Objective   Physical Exam  Vitals and nursing note  reviewed.   Constitutional:       General: He is not in acute distress.     Appearance: He is well-developed. He is not diaphoretic.   HENT:      Head: Normocephalic.   Eyes:      General: No scleral icterus.     Conjunctiva/sclera: Conjunctivae normal.      Pupils: Pupils are equal, round, and reactive to light.   Cardiovascular:      Rate and Rhythm: Normal rate and regular rhythm.      Pulses: Normal pulses.      Heart sounds: Murmur heard.      No gallop.   Pulmonary:      Effort: Pulmonary effort is normal.      Breath sounds: Normal breath sounds. No rales.   Musculoskeletal:         General: Normal range of motion.      Cervical back: Normal range of motion and neck supple.      Right lower leg: Edema present.      Left lower leg: Edema present.      Comments: Only trace   Skin:     General: Skin is warm and dry.      Findings: No rash.   Neurological:      Mental Status: He is alert and oriented to person, place, and time.   Psychiatric:         Mood and Affect: Mood normal. Affect is not inappropriate.         Behavior: Behavior normal.         Thought Content: Thought content normal.         Judgment: Judgment normal.           Assessment & Plan   Diagnoses and all orders for this visit:    1. Immunization due (Primary)  -     Fluzone High-Dose 65+yrs (3290-3349)    2. Carotid stenosis, asymptomatic, bilateral    3. Essential hypertension  -     amLODIPine (NORVASC) 2.5 MG tablet; Take 1 tablet by mouth Daily. For BP  Dispense: 90 tablet; Refill: 1    4. Type 2 diabetes mellitus without complication, without long-term current use of insulin  -     amLODIPine (NORVASC) 2.5 MG tablet; Take 1 tablet by mouth Daily. For BP  Dispense: 90 tablet; Refill: 1    5. Nonrheumatic aortic valve stenosis    6. Bilateral carotid artery stenosis    7. Vitamin D deficiency    8. Mixed hyperlipidemia  -     rosuvastatin (CRESTOR) 20 MG tablet; Take 1 tablet by mouth Daily. For cholesterol  Dispense: 90 tablet; Refill:  3    Other orders  -     irbesartan (AVAPRO) 150 MG tablet; Take 1 tablet by mouth Daily. For BP  Dispense: 90 tablet; Refill: 1        Lab with me due Oct  Takes iron QOD--works  I do yearly PSA  Sees Dr. Hand for surveillance of aortic stenosis  To have repeat Doppler in May for the mild carotid artery stenosis and made notes from cardiology and thoracic surgeon from his last report noting the subclavian artery no arm symptoms  Plan, Reji Morales, was seen today.  he was seen for HTN and continue medication, DMII followed by Endocrinologist, Hyperlipidemia and will continue current medication, and Vitamin D deficiency and supplemented.  \

## 2024-11-18 DIAGNOSIS — E11.9 TYPE 2 DIABETES MELLITUS WITHOUT COMPLICATION, WITHOUT LONG-TERM CURRENT USE OF INSULIN: Primary | ICD-10-CM

## 2024-11-18 RX ORDER — SEMAGLUTIDE 2.68 MG/ML
INJECTION, SOLUTION SUBCUTANEOUS
Qty: 9 ML | Refills: 2 | Status: SHIPPED | OUTPATIENT
Start: 2024-11-18

## 2024-11-18 NOTE — TELEPHONE ENCOUNTER
Rx Refill Note  Requested Prescriptions     Pending Prescriptions Disp Refills    Semaglutide, 2 MG/DOSE, (Ozempic, 2 MG/DOSE,) 8 MG/3ML solution pen-injector 9 mL 2     Si mg weekly subcutaneously      Last office visit with prescribing clinician: 2024   Last telemedicine visit with prescribing clinician: Visit date not found   Next office visit with prescribing clinician: 2025                         Would you like a call back once the refill request has been completed: [] Yes [] No    If the office needs to give you a call back, can they leave a voicemail: [] Yes [] No    Faith Redmond MA  24, 14:52 EST

## 2025-01-31 ENCOUNTER — OFFICE VISIT (OUTPATIENT)
Dept: ENDOCRINOLOGY | Age: 75
End: 2025-01-31
Payer: MEDICARE

## 2025-01-31 VITALS
TEMPERATURE: 97.8 F | SYSTOLIC BLOOD PRESSURE: 126 MMHG | OXYGEN SATURATION: 98 % | DIASTOLIC BLOOD PRESSURE: 72 MMHG | HEART RATE: 70 BPM | WEIGHT: 212.8 LBS | HEIGHT: 68 IN | BODY MASS INDEX: 32.25 KG/M2

## 2025-01-31 DIAGNOSIS — I65.23 BILATERAL CAROTID ARTERY STENOSIS: ICD-10-CM

## 2025-01-31 DIAGNOSIS — E55.9 VITAMIN D DEFICIENCY: ICD-10-CM

## 2025-01-31 DIAGNOSIS — E11.9 TYPE 2 DIABETES MELLITUS WITHOUT COMPLICATION, WITHOUT LONG-TERM CURRENT USE OF INSULIN: Primary | ICD-10-CM

## 2025-01-31 DIAGNOSIS — I10 ESSENTIAL HYPERTENSION: ICD-10-CM

## 2025-01-31 DIAGNOSIS — E78.5 HYPERLIPIDEMIA, UNSPECIFIED HYPERLIPIDEMIA TYPE: ICD-10-CM

## 2025-01-31 LAB
25(OH)D3+25(OH)D2 SERPL-MCNC: 64.1 NG/ML (ref 30–100)
ALBUMIN SERPL-MCNC: 4.4 G/DL (ref 3.5–5.2)
ALBUMIN/GLOB SERPL: 2 G/DL
ALP SERPL-CCNC: 60 U/L (ref 39–117)
ALT SERPL-CCNC: 22 U/L (ref 1–41)
AST SERPL-CCNC: 18 U/L (ref 1–40)
BILIRUB SERPL-MCNC: 1.5 MG/DL (ref 0–1.2)
BUN SERPL-MCNC: 12 MG/DL (ref 8–23)
BUN/CREAT SERPL: 15.2 (ref 7–25)
CALCIUM SERPL-MCNC: 9.7 MG/DL (ref 8.6–10.5)
CHLORIDE SERPL-SCNC: 101 MMOL/L (ref 98–107)
CHOLEST SERPL-MCNC: 94 MG/DL (ref 0–200)
CO2 SERPL-SCNC: 26.2 MMOL/L (ref 22–29)
CREAT SERPL-MCNC: 0.79 MG/DL (ref 0.76–1.27)
EGFRCR SERPLBLD CKD-EPI 2021: 93.2 ML/MIN/1.73
GLOBULIN SER CALC-MCNC: 2.2 GM/DL
GLUCOSE SERPL-MCNC: 120 MG/DL (ref 65–99)
HBA1C MFR BLD: 6.8 % (ref 4.8–5.6)
HDLC SERPL-MCNC: 44 MG/DL (ref 40–60)
IMP & REVIEW OF LAB RESULTS: NORMAL
LDLC SERPL CALC-MCNC: 30 MG/DL (ref 0–100)
POTASSIUM SERPL-SCNC: 4.8 MMOL/L (ref 3.5–5.2)
PROT SERPL-MCNC: 6.6 G/DL (ref 6–8.5)
SODIUM SERPL-SCNC: 139 MMOL/L (ref 136–145)
TRIGL SERPL-MCNC: 107 MG/DL (ref 0–150)
TSH SERPL DL<=0.005 MIU/L-ACNC: 2.31 UIU/ML (ref 0.27–4.2)
UNABLE TO VOID: NORMAL
VIT B12 SERPL-MCNC: 719 PG/ML (ref 211–946)
VLDLC SERPL CALC-MCNC: 20 MG/DL (ref 5–40)

## 2025-01-31 RX ORDER — METFORMIN HYDROCHLORIDE 500 MG/1
TABLET, EXTENDED RELEASE ORAL
Qty: 360 TABLET | Refills: 1 | Status: SHIPPED | OUTPATIENT
Start: 2025-01-31

## 2025-01-31 RX ORDER — SEMAGLUTIDE 2.68 MG/ML
INJECTION, SOLUTION SUBCUTANEOUS
Qty: 9 ML | Refills: 1 | Status: SHIPPED | OUTPATIENT
Start: 2025-01-31

## 2025-01-31 RX ORDER — ERGOCALCIFEROL 1.25 MG/1
50000 CAPSULE, LIQUID FILLED ORAL 2 TIMES WEEKLY
Qty: 24 CAPSULE | Refills: 1 | Status: CANCELLED | OUTPATIENT
Start: 2025-02-03

## 2025-01-31 NOTE — PROGRESS NOTES
Kalie Morales is a 74 y.o. male.     History of Present Illness     He has known diabetes mellitus since 2012.  He is on metformin  mg 1 tablet twice a day and Ozempic 2 mg weekly.  He was supposed to be on metformin  mg 2 tablets twice a day.  He does not check his blood sugars at home.  He denies hypoglycemic episodes.  He has lost 14 pounds since January 2024.  His last meal was last night.     His last eye examination was in 5/24.  He has no retinopathy.  He denies eye complaints.  Urine microalbumin was normal in 1/24.  He denies numbness, tingling or burning in his hands or feet.     He has hyperlipidemia and is on Crestor 20 mg/day, fenofibrate 145 mg/day, and Vascepa 2 g twice a day.  He denies myalgia.     He has hypertension and is on irbesartan 150 mg/day and amlodipine 2.5 mg/day.  He has mild to moderate aortic valve stenosis which is being monitored by Dr. Hand.  He has no history of heart attack or stroke.  He denies chest pain or shortness of breath.     He has carotid artery disease.  Ultrasound done in May 2024 showed less than 50% stenosis of both internal carotid artery and right subclavian stenosis.  He denies arm pain.  He is being monitored by Dr. Hand.  He is on aspirin     He has vitamin D deficiency and is on ergocalciferol 50,000 units twice a week.  He denies diarrhea.     He is a retired state employee.    The following portions of the patient's history were reviewed and updated as appropriate: allergies, current medications, past family history, past medical history, past social history, past surgical history, and problem list.    Review of Systems   Eyes:  Negative for visual disturbance.   Respiratory:  Negative for shortness of breath and wheezing.    Cardiovascular:  Negative for chest pain and palpitations.   Gastrointestinal: Negative.    Genitourinary: Negative.    Musculoskeletal:  Negative for myalgias.   Neurological:  Negative for numbness.  "    Vitals:    01/31/25 0936   BP: 126/72   Pulse: 70   Temp: 97.8 °F (36.6 °C)   TempSrc: Oral   SpO2: 98%   Weight: 96.5 kg (212 lb 12.8 oz)   Height: 172.7 cm (67.99\")      Objective   Physical Exam  Constitutional:       General: He is not in acute distress.     Appearance: Normal appearance. He is not ill-appearing, toxic-appearing or diaphoretic.   Eyes:      General: No scleral icterus.        Right eye: No discharge.         Left eye: No discharge.      Extraocular Movements: Extraocular movements intact.   Cardiovascular:      Rate and Rhythm: Normal rate and regular rhythm.      Heart sounds: Murmur heard.   Pulmonary:      Breath sounds: No rales.   Chest:      Chest wall: No tenderness.   Abdominal:      Palpations: Abdomen is soft.      Tenderness: There is no right CVA tenderness or left CVA tenderness.   Musculoskeletal:      Right lower leg: No edema.      Left lower leg: No edema.      Comments: Feet warm.  No cyanosis or pedal edema no clubbing.  No plantar ulcers.   Neurological:      Mental Status: He is alert and oriented to person, place, and time.       Results Encounter on 10/06/2024   Component Date Value Ref Range Status    WBC 10/09/2024 9.2  3.4 - 10.8 x10E3/uL Final    RBC 10/09/2024 4.83  4.14 - 5.80 x10E6/uL Final    Hemoglobin 10/09/2024 15.6  13.0 - 17.7 g/dL Final    Hematocrit 10/09/2024 47.3  37.5 - 51.0 % Final    MCV 10/09/2024 98 (H)  79 - 97 fL Final    MCH 10/09/2024 32.3  26.6 - 33.0 pg Final    MCHC 10/09/2024 33.0  31.5 - 35.7 g/dL Final    RDW 10/09/2024 12.3  11.6 - 15.4 % Final    Platelets 10/09/2024 276  150 - 450 x10E3/uL Final    Neutrophil Rel % 10/09/2024 73  Not Estab. % Final    Lymphocyte Rel % 10/09/2024 16  Not Estab. % Final    Monocyte Rel % 10/09/2024 8  Not Estab. % Final    Eosinophil Rel % 10/09/2024 2  Not Estab. % Final    Basophil Rel % 10/09/2024 1  Not Estab. % Final    Neutrophils Absolute 10/09/2024 6.6  1.4 - 7.0 x10E3/uL Final    Lymphocytes " Absolute 10/09/2024 1.5  0.7 - 3.1 x10E3/uL Final    Monocytes Absolute 10/09/2024 0.8  0.1 - 0.9 x10E3/uL Final    Eosinophils Absolute 10/09/2024 0.2  0.0 - 0.4 x10E3/uL Final    Basophils Absolute 10/09/2024 0.1  0.0 - 0.2 x10E3/uL Final    Immature Granulocyte Rel % 10/09/2024 0  Not Estab. % Final    Immature Grans Absolute 10/09/2024 0.0  0.0 - 0.1 x10E3/uL Final    PSA 10/09/2024 0.7  0.0 - 4.0 ng/mL Final    Comment: Roche ECLIA methodology.  According to the American U[employer] Association, Serum PSA should  decrease and remain at undetectable levels after radical  prostatectomy. The AUA defines biochemical recurrence as an initial  PSA value 0.2 ng/mL or greater followed by a subsequent confirmatory  PSA value 0.2 ng/mL or greater.  Values obtained with different assay methods or kits cannot be used  interchangeably. Results cannot be interpreted as absolute evidence  of the presence or absence of malignant disease.      Ferritin 10/09/2024 196  30 - 400 ng/mL Final    TIBC 10/09/2024 406  250 - 450 ug/dL Final    UIBC 10/09/2024 301  111 - 343 ug/dL Final    Iron 10/09/2024 105  38 - 169 ug/dL Final    Iron Saturation 10/09/2024 26  15 - 55 % Final    Vitamin B-12 10/09/2024 705  232 - 1,245 pg/mL Final    Folate 10/09/2024 >20.0  >3.0 ng/mL Final    Comment: A serum folate concentration of less than 3.1 ng/mL is  considered to represent clinical deficiency.       Assessment & Plan   Diagnoses and all orders for this visit:    1. Type 2 diabetes mellitus without complication, without long-term current use of insulin (Primary)  -     Semaglutide, 2 MG/DOSE, (Ozempic, 2 MG/DOSE,) 8 MG/3ML solution pen-injector; 2 mg weekly subcutaneously  Indications: Type 2 Diabetes  Dispense: 9 mL; Refill: 1  -     metFORMIN ER (GLUCOPHAGE-XR) 500 MG 24 hr tablet; 2 tablets twice a day with meals  Indications: Type 2 Diabetes  Dispense: 360 tablet; Refill: 1  -     Comprehensive Metabolic Panel  -     Hemoglobin A1c  -      Microalbumin / Creatinine Urine Ratio - Urine, Clean Catch  -     Vitamin B12  -     Lipid Panel  -     TSH Rfx On Abnormal To Free T4    2. Vitamin D deficiency  -     Vitamin D,25-Hydroxy    3. Hyperlipidemia, unspecified hyperlipidemia type  -     Lipid Panel    4. Essential hypertension  -     Comprehensive Metabolic Panel    5. Bilateral carotid artery stenosis  -     Lipid Panel      Continue Ozempic 2 mg weekly and metformin  mg 1 tablet twice a day.    Continue Crestor 20 mg/day, fenofibrate 145 mg/day, and Vascepa 2 g twice a day.    Continue irbesartan 150 mg/day and amlodipine 2.5 mg/day.  Follow-up with Dr. Hand as scheduled.    Check vitamin D levels and consider switching to vitamin D3.    Copy of my note sent to JAVIER Valle.    RTC 6 mos

## 2025-02-02 NOTE — PROGRESS NOTES
Hemoglobin A1c 6.8%.  Diabetes is well-controlled.  Continue Ozempic 2 mg weekly and metformin  mg 1 tablet twice a day.  Normal vitamin B12.  LDL 30.  HDL 44.  Triglycerides 107.  Crestor 20 mg/day and Vascepa 2 g twice a day.  Discontinue fenofibrate.  Normal thyroid function test.  Normal vitamin D.  Finish up current supply for ergocalciferol 50,000 units twice a week and switch to OTC vitamin D3 2000 units 1 tablet daily.  Copy of labs sent to Terri HERRERA  Please notify patient of results and instructions.

## 2025-02-03 ENCOUNTER — TELEPHONE (OUTPATIENT)
Dept: ENDOCRINOLOGY | Age: 75
End: 2025-02-03
Payer: MEDICARE

## 2025-02-03 NOTE — TELEPHONE ENCOUNTER
2/3 called and lm for pt to call reg his labs   Ok for hub to read to patient   Please schedule labs if needed or follow ups  Ok for  to read to patient   Please schedule labs if needed or follow ups       ----- Message from Moses Canchola sent at 2/2/2025  1:51 PM EST -----  Hemoglobin A1c 6.8%.  Diabetes is well-controlled.  Continue Ozempic 2 mg weekly and metformin  mg 1 tablet twice a day.  Normal vitamin B12.  LDL 30.  HDL 44.  Triglycerides 107.  Crestor 20 mg/day and Vascepa 2 g twice a day.  Discontinue fenofibrate.  Normal thyroid function test.  Normal vitamin D.  Finish up current supply for ergocalciferol 50,000 units twice a week and switch to OTC vitamin D3 2000 units 1 tablet daily.

## 2025-03-31 NOTE — TELEPHONE ENCOUNTER
PA Case: [id number], Status: Approved     The patient blood pressure is controlled at 126/75.  I will continue losartan/HCTZ at 100/12.5 mg p.o. every morning.

## 2025-04-15 ENCOUNTER — HOSPITAL ENCOUNTER (OUTPATIENT)
Dept: CARDIOLOGY | Facility: HOSPITAL | Age: 75
Discharge: HOME | End: 2025-04-15
Admitting: PHYSICIAN ASSISTANT
Payer: MEDICARE

## 2025-04-15 DIAGNOSIS — I77.1 SUBCLAVIAN ARTERY STENOSIS, RIGHT: ICD-10-CM

## 2025-04-15 DIAGNOSIS — I65.22 STENOSIS OF LEFT CAROTID ARTERY: ICD-10-CM

## 2025-04-15 LAB
BH CV XLRA MEAS LEFT DIST CCA EDV: -21 CM/SEC
BH CV XLRA MEAS LEFT DIST CCA PSV: -71.5 CM/SEC
BH CV XLRA MEAS LEFT DIST ICA EDV: -26.6 CM/SEC
BH CV XLRA MEAS LEFT DIST ICA PSV: -108.6 CM/SEC
BH CV XLRA MEAS LEFT ICA/CCA RATIO: 1.52
BH CV XLRA MEAS LEFT MID ICA EDV: -25.2 CM/SEC
BH CV XLRA MEAS LEFT MID ICA PSV: -98.1 CM/SEC
BH CV XLRA MEAS LEFT PROX CCA EDV: 21.2 CM/SEC
BH CV XLRA MEAS LEFT PROX CCA PSV: 95.6 CM/SEC
BH CV XLRA MEAS LEFT PROX ECA PSV: -105.8 CM/SEC
BH CV XLRA MEAS LEFT PROX ICA EDV: -11 CM/SEC
BH CV XLRA MEAS LEFT PROX ICA PSV: -53.8 CM/SEC
BH CV XLRA MEAS LEFT PROX SCLA PSV: 118.7 CM/SEC
BH CV XLRA MEAS LEFT VERTEBRAL A PSV: 85.5 CM/SEC
BH CV XLRA MEAS RIGHT DIST CCA EDV: 19.9 CM/SEC
BH CV XLRA MEAS RIGHT DIST CCA PSV: 78.3 CM/SEC
BH CV XLRA MEAS RIGHT DIST ICA EDV: -23.6 CM/SEC
BH CV XLRA MEAS RIGHT DIST ICA PSV: -69.6 CM/SEC
BH CV XLRA MEAS RIGHT ICA/CCA RATIO: 1.05
BH CV XLRA MEAS RIGHT MID ICA EDV: -25.5 CM/SEC
BH CV XLRA MEAS RIGHT MID ICA PSV: -82 CM/SEC
BH CV XLRA MEAS RIGHT PROX CCA EDV: 19.9 CM/SEC
BH CV XLRA MEAS RIGHT PROX CCA PSV: 100 CM/SEC
BH CV XLRA MEAS RIGHT PROX ECA PSV: -106.6 CM/SEC
BH CV XLRA MEAS RIGHT PROX ICA EDV: -20.5 CM/SEC
BH CV XLRA MEAS RIGHT PROX ICA PSV: -70.2 CM/SEC
BH CV XLRA MEAS RIGHT PROX SCLA PSV: 109.8 CM/SEC
BH CV XLRA MEAS RIGHT VERTEBRAL A PSV: 45.4 CM/SEC
LEFT ARM BP: NORMAL MMHG
RIGHT ARM BP: NORMAL MMHG

## 2025-04-15 PROCEDURE — 93880 EXTRACRANIAL BILAT STUDY: CPT

## 2025-04-16 ENCOUNTER — OFFICE VISIT (OUTPATIENT)
Dept: FAMILY MEDICINE CLINIC | Facility: CLINIC | Age: 75
End: 2025-04-16
Payer: MEDICARE

## 2025-04-16 VITALS
RESPIRATION RATE: 16 BRPM | SYSTOLIC BLOOD PRESSURE: 132 MMHG | TEMPERATURE: 97.6 F | HEIGHT: 68 IN | HEART RATE: 62 BPM | DIASTOLIC BLOOD PRESSURE: 70 MMHG | WEIGHT: 209 LBS | BODY MASS INDEX: 31.67 KG/M2 | OXYGEN SATURATION: 95 %

## 2025-04-16 DIAGNOSIS — E78.2 MIXED HYPERLIPIDEMIA: ICD-10-CM

## 2025-04-16 DIAGNOSIS — I65.23 CAROTID STENOSIS, ASYMPTOMATIC, BILATERAL: Primary | ICD-10-CM

## 2025-04-16 DIAGNOSIS — R79.0 LOW IRON STORES: ICD-10-CM

## 2025-04-16 DIAGNOSIS — I65.23 BILATERAL CAROTID ARTERY STENOSIS: ICD-10-CM

## 2025-04-16 DIAGNOSIS — E55.9 VITAMIN D DEFICIENCY: ICD-10-CM

## 2025-04-16 DIAGNOSIS — E11.9 CONTROLLED TYPE 2 DIABETES MELLITUS WITHOUT COMPLICATION, WITHOUT LONG-TERM CURRENT USE OF INSULIN: ICD-10-CM

## 2025-04-16 DIAGNOSIS — I10 ESSENTIAL HYPERTENSION: ICD-10-CM

## 2025-04-16 DIAGNOSIS — E11.9 TYPE 2 DIABETES MELLITUS WITHOUT COMPLICATION, WITHOUT LONG-TERM CURRENT USE OF INSULIN: Chronic | ICD-10-CM

## 2025-04-16 RX ORDER — ICOSAPENT ETHYL 1000 MG/1
CAPSULE ORAL
Qty: 360 CAPSULE | Refills: 1 | Status: SHIPPED | OUTPATIENT
Start: 2025-04-16

## 2025-04-16 RX ORDER — ROSUVASTATIN CALCIUM 20 MG/1
20 TABLET, COATED ORAL DAILY
Qty: 90 TABLET | Refills: 3 | Status: SHIPPED | OUTPATIENT
Start: 2025-04-16

## 2025-04-16 RX ORDER — IRBESARTAN 150 MG/1
150 TABLET ORAL DAILY
Qty: 90 TABLET | Refills: 1 | Status: SHIPPED | OUTPATIENT
Start: 2025-04-16

## 2025-04-16 RX ORDER — AMLODIPINE BESYLATE 2.5 MG/1
2.5 TABLET ORAL DAILY
Qty: 90 TABLET | Refills: 1 | Status: SHIPPED | OUTPATIENT
Start: 2025-04-16

## 2025-04-16 NOTE — PROGRESS NOTES
"Kalie Morales is a 75 y.o. male.     History of Present Illness    Since the last visit, he has overall felt well.  He has Primary Hypertension and well controlled on current medication, Hyperlipidemia with goals met with current Rx, and Vitamin D deficiency and labs are at goal >30 ng/mL.  he has been compliant with current medications have reviewed them.  The patient denies medication side effects.  Will refill medications. /70   Pulse 62   Temp 97.6 °F (36.4 °C) (Temporal)   Resp 16   Ht 172.7 cm (67.99\")   Wt 94.8 kg (209 lb)   SpO2 95%   BMI 31.79 kg/m² .      Great job on continuing to get weight down  Walking for exercise  Results for orders placed or performed during the hospital encounter of 04/15/25   Duplex Carotid Ultrasound CAR    Collection Time: 04/15/25 11:04 AM   Result Value Ref Range    Prox CCA .0 cm/sec    Prox CCA EDV 19.9 cm/sec    Dist CCA PSV 78.3 cm/sec    Dist CCA EDV 19.9 cm/sec    Prox ICA PSV -70.2 cm/sec    Prox ICA EDV -20.5 cm/sec    Mid ICA PSV -82.0 cm/sec    Mid ICA EDV -25.5 cm/sec    Dist ICA PSV -69.6 cm/sec    Dist ICA EDV -23.6 cm/sec    Prox ECA PSV -106.6 cm/sec    Vertebral A PSV 45.4 cm/sec    Prox SCLA .8 cm/sec    Prox CCA PSV 95.6 cm/sec    Prox CCA EDV 21.2 cm/sec    Dist CCA PSV -71.5 cm/sec    Dist CCA EDV -21.0 cm/sec    Prox ICA PSV -53.8 cm/sec    Prox ICA EDV -11.0 cm/sec    Mid ICA PSV -98.1 cm/sec    Mid ICA EDV -25.2 cm/sec    Dist ICA PSV -108.6 cm/sec    Dist ICA EDV -26.6 cm/sec    Prox ECA PSV -105.8 cm/sec    Vertebral A PSV 85.5 cm/sec    Prox SCLA .7 cm/sec    ICA/CCA ratio 1.05     ICA/CCA ratio 1.52     Right arm /80 mmHg    Left arm /74 mmHg     Carotid doppler yesterday is unchanged--mild plaque  Repeat scan 1 year continue statin  Last visit DR Canchola, endocrine 1-31-25--- he noted hemoglobin A1c at 6.8%.  Diabetes well-controlled continues on Ozempic and metformin also on hyperlipidemia " medication Crestor and Vascepa --stopped fenofibrate with reviewing last lab results and this was noted on 2/2/2025. Vit D 2,000 IU daily and  noted last urine microalbumin was normal.  Also noted his primary hypertension and controlled on medication.    Last visit with Dr. Hand cardiology was 10/8/2024 reviewed his EKG day of visit noting nonspecific EKG and also sees him for nonrheumatic aortic valve stenosis noting that blood pressure is well-controlled and continues to monitor aortic valve stenosis patient's asymptomatic plan to repeat echo in 1 year noting his last echo was 3 years ago.-----echo set Oct  Does have heart murmur  Has mild to moderate aortic stenosis ----  Need records from 7/22/2019 that patient had been to urology Dr. Hampton noted nodular prostate and due to age follow-up as needed.   No recent renal stones  Last PSA was 0.7 on 10/9/2024  Also notes CBC in normal range on 10/9/2024 all vitamin levels were in desirable range and continues to take iron 3 times a week note that serum iron was normal at 105 and TIBC was 406    No ER visits or urgent care visits since last appointment  The following portions of the patient's history were reviewed and updated as appropriate: allergies, current medications, past family history, past medical history, past social history, past surgical history, and problem list.    Review of Systems    Objective   Physical Exam  Vitals and nursing note reviewed.   Constitutional:       General: He is not in acute distress.     Appearance: Normal appearance. He is well-developed. He is not diaphoretic.   HENT:      Head: Normocephalic.   Eyes:      Conjunctiva/sclera: Conjunctivae normal.      Pupils: Pupils are equal, round, and reactive to light.   Neck:      Vascular: Carotid bruit present.   Cardiovascular:      Rate and Rhythm: Normal rate and regular rhythm.      Pulses: Normal pulses.      Heart sounds: Murmur heard.   Pulmonary:      Effort: Pulmonary effort  is normal.      Breath sounds: Normal breath sounds.   Musculoskeletal:         General: Normal range of motion.      Cervical back: Normal range of motion and neck supple.      Right lower leg: Edema present.      Left lower leg: Edema present.      Comments: Trace pedal edema = bilat     Skin:     General: Skin is warm and dry.      Findings: No rash.   Neurological:      Mental Status: He is alert and oriented to person, place, and time.   Psychiatric:         Mood and Affect: Mood normal. Affect is not inappropriate.         Behavior: Behavior normal.         Thought Content: Thought content normal.         Judgment: Judgment normal.           Assessment & Plan   Diagnoses and all orders for this visit:    1. Carotid stenosis, asymptomatic, bilateral (Primary)  -     Comprehensive Metabolic Panel; Future  -     CBC & Differential; Future  -     Vitamin B12; Future  -     Folate; Future  -     Ferritin; Future  -     Iron Profile; Future  -     Urinalysis With Microscopic - Urine, Clean Catch; Future    2. Essential hypertension  -     Comprehensive Metabolic Panel; Future  -     CBC & Differential; Future  -     Vitamin B12; Future  -     Folate; Future  -     Ferritin; Future  -     Iron Profile; Future  -     Urinalysis With Microscopic - Urine, Clean Catch; Future    3. Bilateral carotid artery stenosis  -     Comprehensive Metabolic Panel; Future  -     CBC & Differential; Future  -     Vitamin B12; Future  -     Folate; Future  -     Ferritin; Future  -     Iron Profile; Future  -     Urinalysis With Microscopic - Urine, Clean Catch; Future    4. Vitamin D deficiency  -     Comprehensive Metabolic Panel; Future  -     CBC & Differential; Future  -     Vitamin B12; Future  -     Folate; Future  -     Ferritin; Future  -     Iron Profile; Future  -     Urinalysis With Microscopic - Urine, Clean Catch; Future    5. Mixed hyperlipidemia  -     Comprehensive Metabolic Panel; Future  -     CBC & Differential;  Future  -     Vitamin B12; Future  -     Folate; Future  -     Ferritin; Future  -     Iron Profile; Future  -     Urinalysis With Microscopic - Urine, Clean Catch; Future    6. Low iron stores  -     Comprehensive Metabolic Panel; Future  -     CBC & Differential; Future  -     Vitamin B12; Future  -     Folate; Future  -     Ferritin; Future  -     Iron Profile; Future  -     Urinalysis With Microscopic - Urine, Clean Catch; Future    7. Controlled type 2 diabetes mellitus without complication, without long-term current use of insulin  -     Comprehensive Metabolic Panel; Future  -     CBC & Differential; Future  -     Vitamin B12; Future  -     Folate; Future  -     Ferritin; Future  -     Iron Profile; Future  -     Urinalysis With Microscopic - Urine, Clean Catch; Future      Plan, Reji MAO Barretoemma, was seen today.  he was seen for HTN and continue medication, DMII followed by Endocrinologist, Hyperlipidemia and will continue current medication, and Vitamin D deficiency and supplemented.    Has mild-moderate nonrheumatic aortic stenosis followed by cardiology Dr. Hand yearly next echo is due in October  I will continue to moderate her his mild atherosclerosis bilateral carotid with yearly Doppler  Continues to take iron 3 times a week and I will update iron labs October  No renal stones for several years  Yearly eye exam----

## 2025-04-16 NOTE — ASSESSMENT & PLAN NOTE
Orders:    Comprehensive Metabolic Panel; Future    CBC & Differential; Future    Vitamin B12; Future    Folate; Future    Ferritin; Future    Iron Profile; Future    Urinalysis With Microscopic - Urine, Clean Catch; Future

## 2025-04-16 NOTE — PROGRESS NOTES
Subjective   The ABCs of the Annual Wellness Visit  Medicare Wellness Visit      Reji Morales is a 75 y.o. patient who presents for a Medicare Wellness Visit.    The following portions of the patient's history were reviewed and   updated as appropriate: allergies, current medications, past family history, past medical history, past social history, past surgical history, and problem list.    Compared to one year ago, the patient's physical   health is the same.  Compared to one year ago, the patient's mental   health is the same.    Recent Hospitalizations:  He was not admitted to the hospital during the last year.     Current Medical Providers:  Patient Care Team:  Terri Park PA-C as PCP - General (Family Medicine)  Dimas Hampton MD as Consulting Physician (Urology)  Moses Canchola MD as Consulting Physician (Endocrinology)  Devan Hand MD as Consulting Physician (Cardiology)    Outpatient Medications Prior to Visit   Medication Sig Dispense Refill    amLODIPine (NORVASC) 2.5 MG tablet Take 1 tablet by mouth Daily. For BP 90 tablet 1    aspirin 81 MG disintegrating tablet Take  by mouth.      B Complex Vitamins (vitamin b complex) capsule capsule Take 1 capsule by mouth Daily.      ferrous sulfate 325 (65 Fe) MG tablet Take 1 tablet by mouth. Every Other Day      irbesartan (AVAPRO) 150 MG tablet Take 1 tablet by mouth Daily. For BP 90 tablet 1    metFORMIN ER (GLUCOPHAGE-XR) 500 MG 24 hr tablet 2 tablets twice a day with meals  Indications: Type 2 Diabetes 360 tablet 1    Misc Natural Products (IMMUNE FORMULA PO) Take  by mouth.      rosuvastatin (CRESTOR) 20 MG tablet Take 1 tablet by mouth Daily. For cholesterol 90 tablet 3    Semaglutide, 2 MG/DOSE, (Ozempic, 2 MG/DOSE,) 8 MG/3ML solution pen-injector 2 mg weekly subcutaneously  Indications: Type 2 Diabetes 9 mL 1    Vascepa 1 g capsule capsule TAKE FOUR CAPSULES BY MOUTH EVERY  capsule 1    vitamin D (ERGOCALCIFEROL) 1.25 MG  "(92015 UT) capsule capsule Take 1 capsule by mouth 2 (Two) Times a Week. Indications: Vitamin D Deficiency (Patient not taking: Reported on 4/16/2025) 24 capsule 1     No facility-administered medications prior to visit.     No opioid medication identified on active medication list. I have reviewed chart for other potential  high risk medication/s and harmful drug interactions in the elderly.      Aspirin is on active medication list. Aspirin use is indicated based on review of current medical condition/s. Pros and cons of this therapy have been discussed today. Benefits of this medication outweigh potential harm.  Patient has been encouraged to continue taking this medication.  .      Patient Active Problem List   Diagnosis    Hyperlipidemia    Essential hypertension    Vitamin D deficiency    Type 2 diabetes mellitus without complication, without long-term current use of insulin    Benign non-nodular prostatic hyperplasia without lower urinary tract symptoms    Anemia    Nonrheumatic aortic valve stenosis    Bilateral carotid artery stenosis    Subclavian artery stenosis, right     Advance Care Planning Advance Directive is on file.  ACP discussion was held with the patient during this visit. Patient has an advance directive in EMR which is still valid.             Objective   Vitals:    04/16/25 1039   BP: 132/70   Pulse: 62   Resp: 16   Temp: 97.6 °F (36.4 °C)   TempSrc: Temporal   SpO2: 95%   Weight: 94.8 kg (209 lb)   Height: 172.7 cm (67.99\")   PainSc: 0-No pain       Estimated body mass index is 31.79 kg/m² as calculated from the following:    Height as of this encounter: 172.7 cm (67.99\").    Weight as of this encounter: 94.8 kg (209 lb).                Does the patient have evidence of cognitive impairment?  Mini mental 5/5  Lab Results   Component Value Date    CHLPL 94 01/31/2025    TRIG 107 01/31/2025    HDL 44 01/31/2025    LDL 30 01/31/2025    VLDL 20 01/31/2025    HGBA1C 6.80 (H) 01/31/2025       "                                                                                         Health  Risk Assessment    Smoking Status:  Social History     Tobacco Use   Smoking Status Never    Passive exposure: Never   Smokeless Tobacco Never     Alcohol Consumption:  Social History     Substance and Sexual Activity   Alcohol Use Yes    Comment: rare       Fall Risk Screen  STEADI Fall Risk Assessment was completed, and patient is at LOW risk for falls.Assessment completed on:2025    Depression Screening   Little interest or pleasure in doing things? Not at all   Feeling down, depressed, or hopeless? Not at all   PHQ-2 Total Score 0      Health Habits and Functional and Cognitive Screenin/16/2025    10:00 AM   Functional & Cognitive Status   Do you have difficulty preparing food and eating? No   Do you have difficulty bathing yourself, getting dressed or grooming yourself? No   Do you have difficulty using the toilet? No   Do you have difficulty moving around from place to place? No   Do you have trouble with steps or getting out of a bed or a chair? No   Current Diet Well Balanced Diet   Dental Exam Up to date   Eye Exam Up to date   Exercise (times per week) 3 times per week   Current Exercises Include Yard Work   Do you need help using the phone?  No   Are you deaf or do you have serious difficulty hearing?  No   Do you need help to go to places out of walking distance? No   Do you need help shopping? No   Do you need help preparing meals?  No   Do you need help with housework?  No   Do you need help with laundry? No   Do you need help taking your medications? No   Do you need help managing money? No   Do you ever drive or ride in a car without wearing a seat belt? No   Have you felt unusual stress, anger or loneliness in the last month? No   Who do you live with? Alone   If you need help, do you have trouble finding someone available to you? No   Have you been bothered in the last four weeks by sexual  problems? No   Do you have difficulty concentrating, remembering or making decisions? No           Age-appropriate Screening Schedule:  Refer to the list below for future screening recommendations based on patient's age, sex and/or medical conditions. Orders for these recommended tests are listed in the plan section. The patient has been provided with a written plan.    Health Maintenance List  Health Maintenance   Topic Date Due    ZOSTER VACCINE (1 of 2) Never done    TDAP/TD VACCINES (2 - Tdap) 03/05/2007    HEPATITIS C SCREENING  Never done    COVID-19 Vaccine (4 - 2024-25 season) 09/01/2024    URINE MICROALBUMIN-CREATININE RATIO (uACR)  01/26/2025    RSV Vaccine - Adults (1 - 1-dose 75+ series) Never done    DIABETIC EYE EXAM  05/07/2025    COLORECTAL CANCER SCREENING  07/28/2025    INFLUENZA VACCINE  07/01/2025    HEMOGLOBIN A1C  07/31/2025    DIABETIC FOOT EXAM  01/31/2026    LIPID PANEL  01/31/2026    ANNUAL WELLNESS VISIT  04/16/2026    Pneumococcal Vaccine 50+  Completed                                                                                                                                                CMS Preventative Services Quick Reference  Risk Factors Identified During Encounter  Immunizations Discussed/Encouraged: RSV (Respiratory Syncytial Virus)    The above risks/problems have been discussed with the patient.  Pertinent information has been shared with the patient in the After Visit Summary.  An After Visit Summary and PPPS were made available to the patient.    Follow Up:   Next Medicare Wellness visit to be scheduled in 1 year.     Assessment & Plan  Carotid stenosis, asymptomatic, bilateral    Orders:    Comprehensive Metabolic Panel; Future    CBC & Differential; Future    Vitamin B12; Future    Folate; Future    Ferritin; Future    Iron Profile; Future    Urinalysis With Microscopic - Urine, Clean Catch; Future    Essential hypertension      Orders:    Comprehensive Metabolic Panel;  Future    CBC & Differential; Future    Vitamin B12; Future    Folate; Future    Ferritin; Future    Iron Profile; Future    Urinalysis With Microscopic - Urine, Clean Catch; Future    amLODIPine (NORVASC) 2.5 MG tablet; Take 1 tablet by mouth Daily. For BP    Bilateral carotid artery stenosis    Orders:    Comprehensive Metabolic Panel; Future    CBC & Differential; Future    Vitamin B12; Future    Folate; Future    Ferritin; Future    Iron Profile; Future    Urinalysis With Microscopic - Urine, Clean Catch; Future    Vitamin D deficiency    Orders:    Comprehensive Metabolic Panel; Future    CBC & Differential; Future    Vitamin B12; Future    Folate; Future    Ferritin; Future    Iron Profile; Future    Urinalysis With Microscopic - Urine, Clean Catch; Future    Mixed hyperlipidemia       Orders:    Comprehensive Metabolic Panel; Future    CBC & Differential; Future    Vitamin B12; Future    Folate; Future    Ferritin; Future    Iron Profile; Future    Urinalysis With Microscopic - Urine, Clean Catch; Future    Vascepa 1 g capsule capsule; TAKE FOUR CAPSULES BY MOUTH EVERY DAY    rosuvastatin (CRESTOR) 20 MG tablet; Take 1 tablet by mouth Daily. For cholesterol    Low iron stores    Orders:    Comprehensive Metabolic Panel; Future    CBC & Differential; Future    Vitamin B12; Future    Folate; Future    Ferritin; Future    Iron Profile; Future    Urinalysis With Microscopic - Urine, Clean Catch; Future    Controlled type 2 diabetes mellitus without complication, without long-term current use of insulin      Orders:    Comprehensive Metabolic Panel; Future    CBC & Differential; Future    Vitamin B12; Future    Folate; Future    Ferritin; Future    Iron Profile; Future    Urinalysis With Microscopic - Urine, Clean Catch; Future    Type 2 diabetes mellitus without complication, without long-term current use of insulin      Orders:    amLODIPine (NORVASC) 2.5 MG tablet; Take 1 tablet by mouth Daily. For BP          Follow Up:   No follow-ups on file.

## 2025-04-16 NOTE — PATIENT INSTRUCTIONS
Medicare Wellness  Personal Prevention Plan of Service     Date of Office Visit:    Encounter Provider:  Terri Park PA-C  Place of Service:  [facility]  Patient Name: Reji Morales  :  1950    As part of the Medicare Wellness portion of your visit today, we are providing you with this personalized preventive plan of services (PPPS). This plan is based upon recommendations of the [place] Preventive Services Task Force (USPSTF) and the Advisory Committee on Immunization Practices (ACIP).    This lists the preventive care services that should be considered, and provides dates of when you are due. Items listed as completed are up-to-date and do not require any further intervention.    Health Maintenance   Topic Date Due    ZOSTER VACCINE (1 of 2) Never done    TDAP/TD VACCINES (2 - Tdap) 2007    HEPATITIS C SCREENING  Never done    COVID-19 Vaccine (4 -  season) 2024    URINE MICROALBUMIN-CREATININE RATIO (uACR)  2025    RSV Vaccine - Adults (1 - 1-dose 75+ series) Never done    DIABETIC EYE EXAM  2025    COLORECTAL CANCER SCREENING  2025    INFLUENZA VACCINE  2025    HEMOGLOBIN A1C  2025    DIABETIC FOOT EXAM  2026    LIPID PANEL  2026    ANNUAL WELLNESS VISIT  2026    Pneumococcal Vaccine 50+  Completed       Orders Placed This Encounter   Procedures    Comprehensive Metabolic Panel     Standing Status:   Future     Expected Date:   10/10/2025     Expiration Date:   2026     Release to patient:   Routine Release [[id number]]    Vitamin B12     Standing Status:   Future     Expected Date:   10/10/2025     Expiration Date:   2026     Release to patient:   Routine Release [[id number]]    Folate     Standing Status:   Future     Expected Date:   10/10/2025     Expiration Date:   2026     Release to patient:   Routine Release [[id number]]    Ferritin     Standing Status:   Future     Expected  Date:   10/10/2025     Expiration Date:   7/16/2026     Release to patient:   Routine Release [[id number]]    Iron Profile     Standing Status:   Future     Expected Date:   10/10/2025     Expiration Date:   7/16/2026     Release to patient:   Routine Release [[id number]]    CBC & Differential     Standing Status:   Future     Expected Date:   10/10/2025     Expiration Date:   7/16/2026     Manual Differential:   No     Release to patient:   Routine Release [[id number]]    Urinalysis With Microscopic - Urine, Clean Catch     Standing Status:   Future     Expected Date:   10/10/2025     Expiration Date:   7/16/2026     Release to patient:   Routine Release [[id number]]       No follow-ups on file.

## 2025-04-16 NOTE — ASSESSMENT & PLAN NOTE
Orders:    Comprehensive Metabolic Panel; Future    CBC & Differential; Future    Vitamin B12; Future    Folate; Future    Ferritin; Future    Iron Profile; Future    Urinalysis With Microscopic - Urine, Clean Catch; Future    amLODIPine (NORVASC) 2.5 MG tablet; Take 1 tablet by mouth Daily. For BP

## 2025-04-16 NOTE — ASSESSMENT & PLAN NOTE
Orders:    Comprehensive Metabolic Panel; Future    CBC & Differential; Future    Vitamin B12; Future    Folate; Future    Ferritin; Future    Iron Profile; Future    Urinalysis With Microscopic - Urine, Clean Catch; Future    Vascepa 1 g capsule capsule; TAKE FOUR CAPSULES BY MOUTH EVERY DAY    rosuvastatin (CRESTOR) 20 MG tablet; Take 1 tablet by mouth Daily. For cholesterol

## 2025-08-04 ENCOUNTER — OFFICE VISIT (OUTPATIENT)
Dept: ENDOCRINOLOGY | Age: 75
End: 2025-08-04
Payer: MEDICARE

## 2025-08-04 VITALS
BODY MASS INDEX: 30.92 KG/M2 | DIASTOLIC BLOOD PRESSURE: 72 MMHG | OXYGEN SATURATION: 98 % | TEMPERATURE: 97.4 F | HEIGHT: 68 IN | WEIGHT: 204 LBS | SYSTOLIC BLOOD PRESSURE: 144 MMHG | HEART RATE: 63 BPM

## 2025-08-04 DIAGNOSIS — I77.1 SUBCLAVIAN ARTERY STENOSIS, RIGHT: ICD-10-CM

## 2025-08-04 DIAGNOSIS — I10 ESSENTIAL HYPERTENSION: ICD-10-CM

## 2025-08-04 DIAGNOSIS — E78.5 HYPERLIPIDEMIA, UNSPECIFIED HYPERLIPIDEMIA TYPE: ICD-10-CM

## 2025-08-04 DIAGNOSIS — E11.9 TYPE 2 DIABETES MELLITUS WITHOUT COMPLICATION, WITHOUT LONG-TERM CURRENT USE OF INSULIN: Primary | ICD-10-CM

## 2025-08-04 DIAGNOSIS — I65.23 BILATERAL CAROTID ARTERY STENOSIS: ICD-10-CM

## 2025-08-04 DIAGNOSIS — E78.2 MIXED HYPERLIPIDEMIA: ICD-10-CM

## 2025-08-04 DIAGNOSIS — E55.9 VITAMIN D DEFICIENCY: ICD-10-CM

## 2025-08-04 PROCEDURE — 99214 OFFICE O/P EST MOD 30 MIN: CPT | Performed by: INTERNAL MEDICINE

## 2025-08-04 PROCEDURE — 3078F DIAST BP <80 MM HG: CPT | Performed by: INTERNAL MEDICINE

## 2025-08-04 PROCEDURE — 3077F SYST BP >= 140 MM HG: CPT | Performed by: INTERNAL MEDICINE

## 2025-08-04 PROCEDURE — 3044F HG A1C LEVEL LT 7.0%: CPT | Performed by: INTERNAL MEDICINE

## 2025-08-04 RX ORDER — METFORMIN HYDROCHLORIDE 500 MG/1
TABLET, EXTENDED RELEASE ORAL
Qty: 360 TABLET | Refills: 1 | Status: SHIPPED | OUTPATIENT
Start: 2025-08-04

## 2025-08-04 RX ORDER — CHOLECALCIFEROL (VITAMIN D3) 50 MCG
TABLET ORAL DAILY
COMMUNITY

## 2025-08-04 RX ORDER — ICOSAPENT ETHYL 1000 MG/1
CAPSULE ORAL
Qty: 360 CAPSULE | Refills: 1 | Status: SHIPPED | OUTPATIENT
Start: 2025-08-04

## 2025-08-05 LAB
25(OH)D3+25(OH)D2 SERPL-MCNC: 45.4 NG/ML (ref 30–100)
ALBUMIN SERPL-MCNC: 4.3 G/DL (ref 3.5–5.2)
ALBUMIN/CREAT UR: 12 MG/G CREAT (ref 0–29)
ALBUMIN/GLOB SERPL: 2 G/DL
ALP SERPL-CCNC: 51 U/L (ref 39–117)
ALT SERPL-CCNC: 19 U/L (ref 1–41)
AST SERPL-CCNC: 19 U/L (ref 1–40)
BILIRUB SERPL-MCNC: 1.4 MG/DL (ref 0–1.2)
BUN SERPL-MCNC: 12 MG/DL (ref 8–23)
BUN/CREAT SERPL: 16.7 (ref 7–25)
CALCIUM SERPL-MCNC: 9.4 MG/DL (ref 8.6–10.5)
CHLORIDE SERPL-SCNC: 102 MMOL/L (ref 98–107)
CHOLEST SERPL-MCNC: 108 MG/DL (ref 0–200)
CO2 SERPL-SCNC: 27.9 MMOL/L (ref 22–29)
CREAT SERPL-MCNC: 0.72 MG/DL (ref 0.76–1.27)
CREAT UR-MCNC: 76.8 MG/DL
EGFRCR SERPLBLD CKD-EPI 2021: 95.3 ML/MIN/1.73
GLOBULIN SER CALC-MCNC: 2.1 GM/DL
GLUCOSE SERPL-MCNC: 119 MG/DL (ref 65–99)
HBA1C MFR BLD: 6.1 % (ref 4.8–5.6)
HDLC SERPL-MCNC: 50 MG/DL (ref 40–60)
IMP & REVIEW OF LAB RESULTS: NORMAL
LDLC SERPL CALC-MCNC: 37 MG/DL (ref 0–100)
MICROALBUMIN UR-MCNC: 9 UG/ML
POTASSIUM SERPL-SCNC: 5.3 MMOL/L (ref 3.5–5.2)
PROT SERPL-MCNC: 6.4 G/DL (ref 6–8.5)
SODIUM SERPL-SCNC: 139 MMOL/L (ref 136–145)
TRIGL SERPL-MCNC: 114 MG/DL (ref 0–150)
VIT B12 SERPL-MCNC: 664 PG/ML (ref 211–946)
VLDLC SERPL CALC-MCNC: 21 MG/DL (ref 5–40)